# Patient Record
Sex: FEMALE | Race: WHITE | NOT HISPANIC OR LATINO | Employment: FULL TIME | URBAN - METROPOLITAN AREA
[De-identification: names, ages, dates, MRNs, and addresses within clinical notes are randomized per-mention and may not be internally consistent; named-entity substitution may affect disease eponyms.]

---

## 2017-09-19 ENCOUNTER — ALLSCRIPTS OFFICE VISIT (OUTPATIENT)
Dept: OTHER | Facility: OTHER | Age: 57
End: 2017-09-19

## 2017-09-19 DIAGNOSIS — Z12.31 ENCOUNTER FOR SCREENING MAMMOGRAM FOR MALIGNANT NEOPLASM OF BREAST: ICD-10-CM

## 2017-09-19 DIAGNOSIS — M79.671 PAIN OF RIGHT FOOT: ICD-10-CM

## 2017-09-20 ENCOUNTER — GENERIC CONVERSION - ENCOUNTER (OUTPATIENT)
Dept: OTHER | Facility: OTHER | Age: 57
End: 2017-09-20

## 2017-09-26 ENCOUNTER — GENERIC CONVERSION - ENCOUNTER (OUTPATIENT)
Dept: OTHER | Facility: OTHER | Age: 57
End: 2017-09-26

## 2018-01-13 NOTE — PROGRESS NOTES
Assessment   1  Well adult on routine health check (V70 0) (Z00 00)  2  Visit for screening mammogram (V76 12) (Z12 31)    Plan  Benign hypertension    · Renew: Lisinopril-Hydrochlorothiazide 20-25 MG Oral Tablet; TAKE 1 TABLET DAILY  Benign hypertension, Diabetes mellitus type 2, uncomplicated, Dyslipidemia    · (1) COMPREHENSIVE METABOLIC PANEL; Status:Active; Requested for:01Nov2016;    · (1) HEMOGLOBIN A1C; Status:Active; Requested for:01Nov2016;    · (1) LIPID PANEL FASTING W DIRECT LDL REFLEX; Status:Active; Requested  for:01Nov2016;   Diabetes mellitus type 2, uncomplicated    · Renew: MetFORMIN HCl - 500 MG Oral Tablet; TITRATE UP TO TAKE 2 TABLETS BY  MOUTH TWICE A DAY AS DIRECTED  Dyslipidemia    · Renew: Atorvastatin Calcium 40 MG Oral Tablet; TAKE 1 TABLET DAILY  Visit for screening mammogram    · * MAMMO SCREENING BILATERAL W CAD; Status:Hold For - Scheduling; Requested  for:11Aug2016;   Well adult on routine health check    · Follow-up visit in 1 year Evaluation and Treatment  Follow-up  Status: Hold For -  Scheduling  Requested for: 11Aug2016   · Begin a limited exercise program ; Status:Complete;   Done: 02RXX5397 09:27AM   · Brush your teeth freq1 and floss at least once a day ; Status:Complete;   Done:  43CSX0674 09:27AM   · Eat a low fat and low cholesterol diet ; Status:Complete;   Done: 20JYA4949 09:27AM   · Use a sun block product with an SPF of 15 or more ; Status:Complete;   Done:  79HBM1622 09:27AM   · Call [phone] if: You find a new or different kind of lump in your breast ;  Status:Complete;   Done: 68ZFG5666 09:27AM   · Call [phone] if: You have any bleeding from the vagina ; Status:Complete;    Done: 43VNK3858 09:27AM   · Call [phone] if: You have any warning signs of skin cancer ; Status:Complete;    Done: 17VDR3554 09:27AM   · Call [phone] if:  You experience a new kind of chest pain (angina) or pressure ;  Status:Complete;   Done: 45WFQ1203 09: 27AM    Discussion/Summary  health maintenance visit Currently, she eats a healthy diet and has an inadequate exercise regimen  the risks and benefits of cervical cancer screening were discussed Breast cancer screening: mammogram has been ordered  Colorectal cancer screening: colorectal cancer screening is current  Chief Complaint  Patient here for Annual Wellness exam      History of Present Illness  HM, Adult Female: The patient is being seen for a health maintenance evaluation  General Health: The patient's health since the last visit is described as good  She has regular dental visits  She denies vision problems  She denies hearing loss  Immunizations status: not up to date  Lifestyle:  She does not have a healthy diet  She does not have any weight concerns  She does not exercise regularly  She does not use tobacco  She denies alcohol use  She denies drug use  Reproductive health: the patient is postmenopausal    Screening: cancer screening reviewed and updated  Cervical cancer screening includes uncertain timing of her last pap smear  Breast cancer screening includes a mammogram performed last year  Colorectal cancer screening includes a colonoscopy performed within the past ten years  metabolic screening reviewed and updated  Metabolic screening includes lipid profile performed within the past five years, glucose screening performed last year and thyroid function test performed last year  HPI: HERE FOR WELLNESS   DIAGNOSED WITH GASTRIC CA      Review of Systems    Constitutional: No fever, no chills, feels well, no tiredness, no recent weight gain or weight loss  Eyes: No complaints of eye pain, no red eyes, no eyesight problems, no discharge, no dry eyes, no itching of eyes  ENT: no complaints of earache, no loss of hearing, no nose bleeds, no nasal discharge, no sore throat, no hoarseness     Cardiovascular: No complaints of slow heart rate, no fast heart rate, no chest pain, no palpitations, no leg claudication, no lower extremity edema  Respiratory: No complaints of shortness of breath, no wheezing, no cough, no SOB on exertion, no orthopnea, no PND  Gastrointestinal: No complaints of abdominal pain, no constipation, no nausea or vomiting, no diarrhea, no bloody stools  Genitourinary: No complaints of dysuria, no incontinence, no pelvic pain, no dysmenorrhea, no vaginal discharge or bleeding  Musculoskeletal: No complaints of arthralgias, no myalgias, no joint swelling or stiffness, no limb pain or swelling  Integumentary: No complaints of skin rash or lesions, no itching, no skin wounds, no breast pain or lump  Neurological: No complaints of headache, no confusion, no convulsions, no numbness, no dizziness or fainting, no tingling, no limb weakness, no difficulty walking  Psychiatric: Not suicidal, no sleep disturbance, no anxiety or depression, no change in personality, no emotional problems  Endocrine: No complaints of proptosis, no hot flashes, no muscle weakness, no deepening of the voice, no feelings of weakness  Hematologic/Lymphatic: No complaints of swollen glands, no swollen glands in the neck, does not bleed easily, does not bruise easily  Active Problems   1  Atypical nevi (216 9) (D22 9)  2  Benign hypertension (401 1) (I10)  3  Cervical cancer screening (V76 2) (Z12 4)  4  Colon cancer screening (V76 51) (Z12 11)  5  Diabetes mellitus type 2, uncomplicated (562 55) (K70 0)  6  Dyslipidemia (272 4) (E78 5)  7  Encounter for vaccination (V05 9) (Z23)  8  Eye lesion (379 90) (H57 9)  9  Flu vaccine need (V04 81) (Z23)  10  Morbid or severe obesity due to excess calories (278 01) (E66 01)  11  Need for DTaP vaccination (V06 1) (Z23)  12  Polyp of sigmoid colon (211 3) (D12 5)  13  Rectal bleeding (569 3) (K62 5)  14   Visit for screening mammogram (V76 12) (Z12 31)    Past Medical History    · History of Abnormal finding on mammography (793 80) (R92 8)   · History of Allergic Reaction (995 3)   · History of Blood pressure elevated (401 9) (I10)   · History of Breast pain (611 71) (N64 4)   · History of Elevated fasting glucose (790 21) (R73 01)   · History of acute bronchitis (V12 69) (Z87 09)   · History of chest pain (V13 89) (J84 720)   · History of fatigue (V13 89) (Z87 898)   · History of Numbness (782 0) (R20 0)    Surgical History    · History of Biopsy Breast Percutaneous Needle Core    Family History  Mother    · Family history of Brain Cancer (191 9)  Father    · Family history of Diabetes Mellitus (250 00)   · Family history of colon cancer (V16 0) (Z80 0)  Sister    · Family history of Brain Cancer (191 9)  Family History    · Family history of Father  At Age 76    Social History    · Denied: Alcohol Use (History)   · Denied: Drug Use   · Never A Smoker    Current Meds  1  Atorvastatin Calcium 20 MG Oral Tablet; take 1 tablet by mouth every day; Therapy: 18ZNW1862 to (Evaluate:27Esp1021)  Requested for: 13QCT3459; Last   Rx:78Ioy3362 Ordered  2  Lisinopril-Hydrochlorothiazide 20-25 MG Oral Tablet; TAKE 1 TABLET DAILY; Therapy: 00FJY3084 to (Evaluate:95Wva1330)  Requested for: 69BTO1355; Last   Rx:2016 Ordered  3  MetFORMIN HCl - 500 MG Oral Tablet; TITRATE UP TO TAKE 2 TABLETS BY MOUTH   TWICE A DAY AS DIRECTED; Therapy: 71Avx4457 to (Evaluate:41Ykq2154)  Requested for: 97HOQ9679; Last   Rx:2016 Ordered    Allergies   1  No Known Drug Allergies   2  No Known Environmental Allergies  3  No Known Food Allergies    Vitals   Recorded: 72AEA2628 77:29AC   Systolic 224 mm Hg   Diastolic 80 mm Hg   Heart Rate 72   Respiration 18   Height 5 ft 8 03 in   Weight 286 lb    BMI Calculated 43 45 kg/m2   BSA Calculated 2 38 m2     Physical Exam    Constitutional   General appearance: No acute distress, well appearing and well nourished  Head and Face   Head and face: Normal     Eyes   Conjunctiva and lids: No swelling, erythema or discharge  Pupils and irises: Equal, round, reactive to light  Ears, Nose, Mouth, and Throat   External inspection of ears and nose: Normal     Otoscopic examination: Tympanic membranes translucent with normal light reflex  Canals patent without erythema  Hearing: Normal     Nasal mucosa, septum, and turbinates: Normal without edema or erythema  Lips, teeth, and gums: Normal, good dentition  Oropharynx: Normal with no erythema, edema, exudate or lesions  Neck   Neck: Supple, symmetric, trachea midline, no masses  Thyroid: Normal, no thyromegaly  Pulmonary   Respiratory effort: No increased work of breathing or signs of respiratory distress  Auscultation of lungs: Clear to auscultation  Cardiovascular   Auscultation of heart: Normal rate and rhythm, normal S1 and S2, no murmurs  Examination of extremities for edema and/or varicosities: Normal     Abdomen   Abdomen: Non-tender, no masses  Liver and spleen: No hepatomegaly or splenomegaly  Lymphatic   Palpation of lymph nodes in neck: No lymphadenopathy  Palpation of lymph nodes in axillae: No lymphadenopathy  Musculoskeletal   Gait and station: Normal     Digits and nails: Normal without clubbing or cyanosis  Joints, bones, and muscles: Normal     Range of motion: Normal     Stability: Normal     Muscle strength/tone: Normal     Skin   Skin and subcutaneous tissue: Normal without rashes or lesions  Palpation of skin and subcutaneous tissue: Normal turgor  Neurologic   Cranial nerves: Cranial nerves II-XII intact  Cortical function: Normal mental status  Reflexes: 2+ and symmetric  Sensation: No sensory loss  Coordination: Normal finger to nose and heel to shin  Psychiatric   Judgment and insight: Normal     Orientation to person, place, and time: Normal     Recent and remote memory: Intact      Mood and affect: Normal        Results/Data  COLONOSCOPY 99Zco0974 12:00AM      Test Name Result Flag Reference Colonoscopy 09/18/2015       Summary / No summary entered :      No summary entered  Documents attached :      sColonoscopies - Katalina Smallwood;  Enc: [id number] - Image Encounter - Katalina mSallwood -      (Family Medicine) (Additional Information Document)    Signatures   Electronically signed by : Sanam Blackburn DO; Aug 11 2016  9:26AM EST                       (Author)

## 2018-01-15 VITALS
HEIGHT: 68 IN | RESPIRATION RATE: 18 BRPM | WEIGHT: 269 LBS | SYSTOLIC BLOOD PRESSURE: 126 MMHG | DIASTOLIC BLOOD PRESSURE: 76 MMHG | BODY MASS INDEX: 40.77 KG/M2 | HEART RATE: 80 BPM

## 2018-01-15 NOTE — PROGRESS NOTES
Assessment    1  Encounter for preventive health examination (V70 0) (Z00 00)   2  Diabetes mellitus type 2, uncomplicated (988 28) (J99 6)   3  Dyslipidemia (272 4) (E78 5)   4  Polyp of sigmoid colon (211 3) (D12 5)   5  Visit for screening mammogram (V76 12) (Z12 31)   6  Flu vaccine need (V04 81) (Z23)   7  Pain of right heel (729 5) (M79 671)   8  History of Atypical nevi (216 9) (D22 9)   9  Atypical nevi (216 9) (D22 9)    Plan  Atypical nevi    · 2 - Maeve TAFOYA, Rebecca Malcolm  (Dermatology) Co-Management  *  Status: Hold For -  Scheduling  Requested for: 42BNH3775  Care Summary provided  : Yes  Benign hypertension    · Lisinopril-Hydrochlorothiazide 20-25 MG Oral Tablet; TAKE 1 TABLET DAILY  Diabetes mellitus type 2, uncomplicated    · MetFORMIN HCl - 500 MG Oral Tablet; TITRATE UP TO TAKE 2 TABLETS BY  MOUTH TWICE A DAY AS DIRECTED   · *VB - Foot Exam; Status:Complete;   Done: 25VZD6110 03:23PM   · *VB - PHQ-9 Tool; Status:Complete;   Done: 98CMB9124 03:57PM  Flu vaccine need    · Fluzone Quadrivalent 0 5 ML Intramuscular Suspension Prefilled Syringe  Need for prophylactic vaccination and inoculation against influenza    · Stop: Fluzone Quadrivalent 0 5 ML Intramuscular Suspension Prefilled  Syringe  Pain of right heel    · * XR FOOT 3+ VIEW RIGHT; Status:Active; Requested for:65Ruc2351;   Polyp of sigmoid colon    · COLONOSCOPY; Status:Active; Requested for:30Jek8588;    · 2 - Kalpana Zhang MD, Erick Bond  Colorectal Surgery, Gastroenterology Co-Management  *   Status: Hold For - Scheduling  Requested for: 59YBL9308  Care Summary provided  : Yes  Visit for screening mammogram    · * MAMMO SCREENING BILATERAL W CAD; Status:Hold For - Scheduling; Requested  for:86Ogo0131;     Discussion/Summary  health maintenance visit Currently, she eats a healthy diet and has an adequate exercise regimen  cervical cancer screening is needed every three years Breast cancer screening: mammogram has been ordered   Colorectal cancer screening: colonoscopy has been ordered  Screening lab work includes hemoglobin  She was advised to be evaluated by an ophthalmologist  Advice and education were given regarding aerobic exercise  Patient discussion: discussed with the patient  DEPRESSION SCREEN POSITIVE DUE TO RECENT LOSS OF   PT IS GOING TO [employer] THROUGH [employer] AND DOENS'T FEEL SHE NEEDS ANYTHING ELSE AT THIS POINT  Chief Complaint  Patient here for annual wellness exam      History of Present Illness  HM, Adult Female: The patient is being seen for a health maintenance evaluation  General Health: The patient's health since the last visit is described as good  She has regular dental visits  She denies vision problems  She denies hearing loss  Immunizations status: up to date  Lifestyle:  She consumes a diverse and healthy diet  She does not have any weight concerns  She exercises regularly  She does not use tobacco  She denies alcohol use  She denies drug use  Screening:      Review of Systems    Constitutional: No fever, no chills, feels well, no tiredness, no recent weight gain or weight loss  Eyes: No complaints of eye pain, no red eyes, no eyesight problems, no discharge, no dry eyes, no itching of eyes  ENT: no complaints of earache, no loss of hearing, no nose bleeds, no nasal discharge, no sore throat, no hoarseness  Cardiovascular: No complaints of slow heart rate, no fast heart rate, no chest pain, no palpitations, no leg claudication, no lower extremity edema  Respiratory: No complaints of shortness of breath, no wheezing, no cough, no SOB on exertion, no orthopnea, no PND  Gastrointestinal: No complaints of abdominal pain, no constipation, no nausea or vomiting, no diarrhea, no bloody stools  Genitourinary: No complaints of dysuria, no incontinence, no pelvic pain, no dysmenorrhea, no vaginal discharge or bleeding     Musculoskeletal: No complaints of arthralgias, no myalgias, no joint swelling or stiffness, no limb pain or swelling  Integumentary: No complaints of skin rash or lesions, no itching, no skin wounds, no breast pain or lump  Neurological: No complaints of headache, no confusion, no convulsions, no numbness, no dizziness or fainting, no tingling, no limb weakness, no difficulty walking  Psychiatric: Not suicidal, no sleep disturbance, no anxiety or depression, no change in personality, no emotional problems  Endocrine: No complaints of proptosis, no hot flashes, no muscle weakness, no deepening of the voice, no feelings of weakness  Hematologic/Lymphatic: No complaints of swollen glands, no swollen glands in the neck, does not bleed easily, does not bruise easily  Over the past 2 weeks, how often have you been bothered by the following problems? 1 ) Little interest or pleasure in doing things? Several days  2 ) Feeling down, depressed or hopeless? Several days  3 ) Trouble falling asleep or sleeping too much? Half the days or more  4 ) Feeling tired or having little energy? Half the days or more  5 ) Poor appetite or overeating? Not at all    6 ) Feeling bad about yourself, or that you are a failure, or have let yourself or your family down? Not at all    7 ) Trouble concentrating on things, such as reading a newspaper or watching television? Several days  8 ) Moving or speaking so slowly that other people could have noticed, or the opposite, moving or speaking faster than usual? Not at all  severity of depression is mild   How difficult have these problems made it for you to do your work, take care of things at home, or get along with people? Not at all  Score 7      Active Problems    1  Benign hypertension (401 1) (I10)   2  Cervical cancer screening (V76 2) (Z12 4)   3  Colon cancer screening (V76 51) (Z12 11)   4  Diabetes mellitus type 2, uncomplicated (780 99) (Y26 2)   5  Dyslipidemia (272 4) (E78 5)   6  Encounter for vaccination (V05 9) (Z23)   7   Eye lesion (379 90) (H57 9)   8  Flu vaccine need (V04 81) (Z23)   9  Morbid or severe obesity due to excess calories (278 01) (E66 01)   10  Need for DTaP vaccination (V06 1) (Z23)   11  Need for prophylactic vaccination and inoculation against influenza (V04 81) (Z23)   12  Polyp of sigmoid colon (211 3) (D12 5)   13  Visit for screening mammogram (V76 12) (Z12 31)   14  Well adult on routine health check (V70 0) (Z00 00)    Past Medical History    · History of Abnormal finding on mammography (793 80) (R92 8)   · History of Allergic Reaction (995 3)   · History of Atypical nevi (216 9) (D22 9)   · History of Blood pressure elevated (401 9) (I10)   · History of Breast pain (611 71) (N64 4)   · History of Elevated fasting glucose (790 21) (R73 01)   · History of acute bronchitis (V12 69) (Z87 09)   · History of chest pain (V13 89) (I23 696)   · History of fatigue (V13 89) (Z87 898)   · History of Numbness (782 0) (R20 0)    Surgical History    · History of Biopsy Breast Percutaneous Needle Core    Family History  Mother    · Family history of Brain Cancer (191 9)  Father    · Family history of Diabetes Mellitus (250 00)   · Family history of colon cancer (V16 0) (Z80 0)  Sister    · Family history of Brain Cancer (191 9)  Family History    · Family history of Father  At Age 76    Social History    · Denied: Alcohol Use (History)   · Denied: Drug Use   · Never A Smoker    Current Meds   1  Atorvastatin Calcium 40 MG Oral Tablet; Take 1 tablet daily; Therapy: 47GBX4375 to (Last Rx:45Xbi0273)  Requested for: 24SKK8478 Ordered   2  Lisinopril-Hydrochlorothiazide 20-25 MG Oral Tablet; TAKE 1 TABLET DAILY; Therapy: 59XJE2227 to (Evaluate:74Kpt4488)  Requested for: 2016; Last   Rx:2016 Ordered   3  MetFORMIN HCl - 500 MG Oral Tablet; TITRATE UP TO TAKE 2 TABLETS BY MOUTH   TWICE A DAY AS DIRECTED;    Therapy: 20Yan9741 to (Evaluate:15Upk2506)  Requested for: 2016; Last   Rx:2016 Ordered    Allergies    1  No Known Drug Allergies    2  No Known Environmental Allergies   3  No Known Food Allergies    Vitals   Recorded: 19Sep2017 03:18PM   Heart Rate 80   Respiration 18   Systolic 576   Diastolic 76   Height 5 ft 7 52 in   Weight 269 lb    BMI Calculated 41 49   BSA Calculated 2 31     Physical Exam    Constitutional   General appearance: No acute distress, well appearing and well nourished  Head and Face   Head and face: Normal     Eyes   Conjunctiva and lids: No swelling, erythema or discharge  Pupils and irises: Equal, round, reactive to light  Ears, Nose, Mouth, and Throat   External inspection of ears and nose: Normal     Otoscopic examination: Tympanic membranes translucent with normal light reflex  Canals patent without erythema  Hearing: Normal     Nasal mucosa, septum, and turbinates: Normal without edema or erythema  Lips, teeth, and gums: Normal, good dentition  Oropharynx: Normal with no erythema, edema, exudate or lesions  Neck   Neck: Supple, symmetric, trachea midline, no masses  Thyroid: Normal, no thyromegaly  Pulmonary   Respiratory effort: No increased work of breathing or signs of respiratory distress  Auscultation of lungs: Clear to auscultation  Cardiovascular   Auscultation of heart: Normal rate and rhythm, normal S1 and S2, no murmurs  Examination of extremities for edema and/or varicosities: Normal     Abdomen   Abdomen: Non-tender, no masses  Liver and spleen: No hepatomegaly or splenomegaly  Lymphatic   Palpation of lymph nodes in neck: No lymphadenopathy  Palpation of lymph nodes in axillae: No lymphadenopathy  Musculoskeletal   Gait and station: Normal     Digits and nails: Normal without clubbing or cyanosis  Joints, bones, and muscles: Normal     Range of motion: Normal     Stability: Normal     Muscle strength/tone: Normal     Skin   Skin and subcutaneous tissue: Abnormal   right side abdomen dark nevi  Neurologic   Cranial nerves: Cranial nerves II-XII intact  Cortical function: Normal mental status  Reflexes: 2+ and symmetric  Sensation: No sensory loss  Coordination: Normal finger to nose and heel to shin  Psychiatric   Judgment and insight: Normal     Orientation to person, place, and time: Normal     Recent and remote memory: Intact  Mood and affect: Normal         Socks and shoes removed, Right Foot Findings: dry  The right toes were normal    The sensory exam showed normal vibratory sensation at the level of the toes on the right  Normal tactile sensation with monofilament testing throughout the right foot  Socks and shoes removed, Left Foot Findings: dry  The left toes were normal    The sensory exam showed normal vibratory sensation at the level of the toes on the left  Pulses:   2+ in the posterior tibialis on the right   2+ in the dorsalis pedis on the right  Pulses:   2+ in the posterior tibialis on the left   2+ in the dorsalis pedis on the left  Assign Risk Category: 0: No loss of protective sensation, no deformity  No present risk        Results/Data  *VB - PHQ-9 Tool 90Eas6401 03:57PM Yasmany Sparrow     Test Name Result Flag Reference   PHQ-9 Adult Depression Score 7     PHQ-9 Adult Depression Screening Positive       *VB - Foot Exam 42Cck7194 03:23PM Yasmany Sparrow     Test Name Result Flag Reference   FOOT EXAM [id number]         Future Appointments    Date/Time Provider Specialty Site   01/22/2018 03:45 PM Yasmany Sparrow DO [facility]     Signatures   Electronically signed by : Mariana Orellana DO; Sep 19 2017  4:11PM EST                       (Author)

## 2018-01-15 NOTE — RESULT NOTES
Verified Results  (1) COMPREHENSIVE METABOLIC PANEL [id number] 32:19XW Vicky Proffer     Test Name Result Flag Reference   GLUCOSE,RANDM 122 mg/dL     If the patient is fasting, the ADA then defines impaired fasting glucose as > 100 mg/dL and diabetes as > or equal to 123 mg/dL  SODIUM 140 mmol/L  136-145   POTASSIUM 3 9 mmol/L  3 5-5 3   CHLORIDE 102 mmol/L  100-108   CARBON DIOXIDE 29 mmol/L  21-32   ANION GAP (CALC) 9 mmol/L  4-13   BLOOD UREA NITROGEN 16 mg/dL  5-25   CREATININE 0 97 mg/dL  0 60-1 30   Standardized to IDMS reference method   CALCIUM 9 0 mg/dL  8 3-10 1   BILI, TOTAL 0 90 mg/dL  0 20-1 00   ALK PHOSPHATAS 82 U/L     ALT (SGPT) 86 U/L H 12-78   AST(SGOT) 43 U/L  5-45   ALBUMIN 3 4 g/dL L 3 5-5 0   TOTAL PROTEIN 7 7 g/dL  6 4-8 2   eGFR Non-African American 59 0 ml/min/1 73sq [facility] [employer] Disease Education Program recommendations are as follows:  GFR calculation is accurate only with a steady state creatinine  Chronic Kidney disease less than 60 ml/min/1 73 sq  meters  Kidney failure less than 15 ml/min/1 73 sq  meters  (1) LIPID PANEL FASTING W DIRECT LDL REFLEX [id number] 11:38AM Vicky Proffer     Test Name Result Flag Reference   CHOLESTEROL 249 mg/dL H    LDL CHOLESTEROL CALCULATED 148 mg/dL H 0-100   Triglyceride:         Normal              <150 mg/dl       Borderline High    150-199 mg/dl       High               200-499 mg/dl       Very High          >499 mg/dl  Cholesterol:         Desirable        <200 mg/dl      Borderline High  200-239 mg/dl      High             >239 mg/dl  HDL Cholesterol:        High    >59 mg/dL      Low     <41 mg/dL  LDL Cholesterol:        Optimal          <100 mg/dl        Near Optimal     100-129 mg/dl        Above Optimal          Borderline High   130-159 mg/dl          High              160-189 mg/dl          Very High        >189 mg/dl  LDL CALCULATED:    This screening LDL is a calculated result    It does not have the accuracy of the Direct Measured LDL in the monitoring of patients with hyperlipidemia and/or statin therapy  Direct Measure LDL (LRL250) must be ordered separately in these patients  TRIGLYCERIDES 276 mg/dL H <=150   Specimen collection should occur prior to N-Acetylcysteine or Metamizole administration due to the potential for falsely depressed results  HDL,DIRECT 46 mg/dL  40-60   Specimen collection should occur prior to Metamizole administration due to the potential for falsely depressed results  (1) TSH WITH FT4 REFLEX 30Jun2016 11:38AM [facility]     Test Name Result Flag Reference   TSH 2 462 uIU/mL  0 358-3 740   Patients undergoing fluorescein dye angiography may retain small amounts of fluorescein in the body for 48-72 hours post procedure  Samples containing fluorescein can produce falsely depressed TSH values  If the patient had this procedure,a specimen should be resubmitted post fluorescein clearance  The recommended reference ranges for TSH during pregnancy are as follows:  First trimester 0 1 to 2 5 uIU/mL  Second trimester  0 2 to 3 0 uIU/mL  Third trimester 0 3 to 3 0 uIU/m     (1) HEMOGLOBIN A1C [id number] 11:38AM [facility]     Test Name Result Flag Reference   HEMOGLOBIN A1C 6 6 % H 4 2-6 3   EST  AVG  GLUCOSE 143 mg/dl       (1) MICROALBUMIN CREATININE RATIO, RANDOM URINE 30Jun2016 11:38AM [facility]     Test Name Result Flag Reference   MICROALBUMIN/ CREAT R 4 mg/g creatinine  0-30   MICROALBUMIN,URINE 7 5 mg/L  0 0-20 0   CREATININE URINE 167 0 mg/dL         Discussion/Summary   CHOLESTEROL IS HIGH  DIABETES NUMBER BETTER  WILL DISCUSS AT NEXT VISIT     Phillip Ewdard

## 2018-09-14 DIAGNOSIS — I10 ESSENTIAL HYPERTENSION: Primary | ICD-10-CM

## 2018-09-14 RX ORDER — LISINOPRIL AND HYDROCHLOROTHIAZIDE 25; 20 MG/1; MG/1
TABLET ORAL
Qty: 30 TABLET | Refills: 0 | Status: SHIPPED | OUTPATIENT
Start: 2018-09-14 | End: 2019-02-26 | Stop reason: SDUPTHER

## 2018-09-20 DIAGNOSIS — E11.9 CONTROLLED TYPE 2 DIABETES MELLITUS WITHOUT COMPLICATION, WITHOUT LONG-TERM CURRENT USE OF INSULIN (HCC): Primary | ICD-10-CM

## 2019-02-26 ENCOUNTER — OFFICE VISIT (OUTPATIENT)
Dept: FAMILY MEDICINE CLINIC | Facility: CLINIC | Age: 59
End: 2019-02-26
Payer: COMMERCIAL

## 2019-02-26 VITALS
RESPIRATION RATE: 14 BRPM | HEIGHT: 68 IN | HEART RATE: 87 BPM | DIASTOLIC BLOOD PRESSURE: 90 MMHG | SYSTOLIC BLOOD PRESSURE: 144 MMHG | BODY MASS INDEX: 41.74 KG/M2 | TEMPERATURE: 98.6 F | OXYGEN SATURATION: 98 % | WEIGHT: 275.4 LBS

## 2019-02-26 DIAGNOSIS — E66.01 MORBID OBESITY (HCC): ICD-10-CM

## 2019-02-26 DIAGNOSIS — Z12.31 ENCOUNTER FOR SCREENING MAMMOGRAM FOR BREAST CANCER: ICD-10-CM

## 2019-02-26 DIAGNOSIS — R06.83 SNORING: ICD-10-CM

## 2019-02-26 DIAGNOSIS — E11.9 TYPE 2 DIABETES MELLITUS WITHOUT COMPLICATION, WITHOUT LONG-TERM CURRENT USE OF INSULIN (HCC): ICD-10-CM

## 2019-02-26 DIAGNOSIS — Z12.11 SCREENING FOR COLON CANCER: ICD-10-CM

## 2019-02-26 DIAGNOSIS — I10 BENIGN HYPERTENSION: ICD-10-CM

## 2019-02-26 DIAGNOSIS — Z23 NEED FOR VACCINATION: ICD-10-CM

## 2019-02-26 DIAGNOSIS — E78.5 DYSLIPIDEMIA: ICD-10-CM

## 2019-02-26 DIAGNOSIS — E11.9 CONTROLLED TYPE 2 DIABETES MELLITUS WITHOUT COMPLICATION, WITHOUT LONG-TERM CURRENT USE OF INSULIN (HCC): ICD-10-CM

## 2019-02-26 DIAGNOSIS — Z00.00 ANNUAL PHYSICAL EXAM: Primary | ICD-10-CM

## 2019-02-26 DIAGNOSIS — I10 ESSENTIAL HYPERTENSION: ICD-10-CM

## 2019-02-26 PROBLEM — D22.9 ATYPICAL NEVI: Status: ACTIVE | Noted: 2017-09-19

## 2019-02-26 PROCEDURE — 99396 PREV VISIT EST AGE 40-64: CPT | Performed by: FAMILY MEDICINE

## 2019-02-26 PROCEDURE — 90732 PPSV23 VACC 2 YRS+ SUBQ/IM: CPT

## 2019-02-26 PROCEDURE — 90471 IMMUNIZATION ADMIN: CPT

## 2019-02-26 RX ORDER — ATORVASTATIN CALCIUM 40 MG/1
1 TABLET, FILM COATED ORAL DAILY
COMMUNITY
Start: 2014-07-21 | End: 2019-02-26 | Stop reason: SDUPTHER

## 2019-02-26 RX ORDER — LISINOPRIL AND HYDROCHLOROTHIAZIDE 25; 20 MG/1; MG/1
1 TABLET ORAL DAILY
Qty: 90 TABLET | Refills: 3 | Status: SHIPPED | OUTPATIENT
Start: 2019-02-26 | End: 2020-03-03

## 2019-02-26 RX ORDER — ATORVASTATIN CALCIUM 40 MG/1
40 TABLET, FILM COATED ORAL DAILY
Qty: 90 TABLET | Refills: 3 | Status: SHIPPED | OUTPATIENT
Start: 2019-02-26 | End: 2020-03-03

## 2019-02-26 NOTE — PATIENT INSTRUCTIONS

## 2019-02-26 NOTE — PROGRESS NOTES
ADULT ANNUAL PHYSICAL  [facility] - [facility]on [facility]    NAME: Yanci Courtney  AGE: 59 y o  SEX: female  : 1960     DATE: 2019     Assessment and Plan:     Problem List Items Addressed This Visit        Endocrine    Diabetes mellitus type 2, uncomplicated ([facility] [address] )    Relevant Medications    metFORMIN (GLUCOPHAGE) 500 mg tablet    Other Relevant Orders    Hemoglobin A1C    Microalbumin / creatinine urine ratio       Cardiovascular and Mediastinum    Benign hypertension    Relevant Medications    lisinopril-hydrochlorothiazide (PRINZIDE,ZESTORETIC) 20-25 MG per tablet    Other Relevant Orders    CBC    Comprehensive metabolic panel       Other    Dyslipidemia    Relevant Medications    atorvastatin (LIPITOR) 40 mg tablet    Other Relevant Orders    Lipid Panel with Direct LDL reflex    TSH, 3rd generation with Free T4 reflex    Morbid obesity ([facility] [address] )    Annual physical exam - Primary    Encounter for screening mammogram for breast cancer    Relevant Orders    Mammo screening bilateral w cad    Screening for colon cancer    Relevant Orders    Ambulatory referral to Colorectal Surgery    Snoring    Relevant Orders    Ambulatory referral to Sleep Medicine      Other Visit Diagnoses     Need for vaccination        Relevant Orders    PNEUMOCOCCAL POLYSACCHARIDE VACCINE 23-VALENT =>3YO SQ IM    Controlled type 2 diabetes mellitus without complication, without long-term current use of insulin (HCC)        Relevant Medications    metFORMIN (GLUCOPHAGE) 500 mg tablet    Essential hypertension        Relevant Medications    lisinopril-hydrochlorothiazide (PRINZIDE,ZESTORETIC) 20-25 MG per tablet          Health maintenance and preventative care screenings were discussed with patient today  Appropriate education was printed on patient's after visit summary  · Discussed risks/benefits of screening for breast cancer, cervical cancer, colorectal cancer, high cholesterol and diabetes   Patient agrees to screening for breast cancer, cervical cancer, colorectal cancer and high cholesterol  · Immunizations were reviewed: patient agrees to pneumococcal vaccine  Counseling:  · Dental Health: discussed importance of regular tooth brushing, flossing, and dental visits  No follow-ups on file  Chief Complaint:     Chief Complaint   Patient presents with    Annual Exam     Patient here for annual wellness exam       History of Present Illness:     Adult Annual Physical   Patient here for a comprehensive physical exam  The patient reports problems - not sleeping  Diet and Physical Activity  · Diet/Nutrition: well balanced diet  · Weight concerns: patient has class 3 obesity (BMI >40)  · Exercise: no formal exercise  Depression Screening  PHQ-9 Depression Screening    PHQ-9:    Frequency of the following problems over the past two weeks:       Little interest or pleasure in doing things:  0 - not at all  Feeling down, depressed, or hopeless:  0 - not at all  PHQ-2 Score:  0       General Health  · Sleep: sleeps poorly  · Hearing: normal - bilateral   · Vision: no vision problems, most recent eye exam <1 year ago and wears glasses  · Dental: regular dental visits and brushes teeth twice daily  /GYN Health  · Patient is: postmenopausal  · Last menstrual period: n/a  · Contraceptive method: none  · Menopausal symptoms: none  Review of Systems:     Review of Systems   Constitutional: Positive for fatigue  HENT: Negative  Eyes: Negative  Respiratory: Negative  Cardiovascular: Negative  Gastrointestinal: Negative  Endocrine: Negative  Genitourinary: Negative  Musculoskeletal: Negative  Skin: Negative  Allergic/Immunologic: Negative  Neurological: Positive for headaches (briefly in am)  Psychiatric/Behavioral: Positive for sleep disturbance  Past Medical History:     No past medical history on file     Past Surgical History:     No past surgical history on file  Social History:     Social History     Socioeconomic History    Marital status: /Civil Union     Spouse name: Not on file    Number of children: Not on file    Years of education: Not on file    Highest education level: Not on file   Occupational History    Not on file   Social Needs    Financial resource strain: Not on file    Food insecurity:     Worry: Not on file     Inability: Not on file    Transportation needs:     Medical: Not on file     Non-medical: Not on file   Tobacco Use    Smoking status: Not on file   Substance and Sexual Activity    Alcohol use: Not on file    Drug use: Not on file    Sexual activity: Not on file   Lifestyle    Physical activity:     Days per week: Not on file     Minutes per session: Not on file    Stress: Not on file   Relationships    Social connections:     Talks on phone: Not on file     Gets together: Not on file     Attends Caodaism service: Not on file     Active member of club or organization: Not on file     Attends meetings of clubs or organizations: Not on file     Relationship status: Not on file    Intimate partner violence:     Fear of current or ex partner: Not on file     Emotionally abused: Not on file     Physically abused: Not on file     Forced sexual activity: Not on file   Other Topics Concern    Not on file   Social History Narrative    Not on file      Family History:     No family history on file  Current Medications:     Current Outpatient Medications   Medication Sig Dispense Refill    atorvastatin (LIPITOR) 40 mg tablet Take 1 tablet (40 mg total) by mouth daily for 90 days 90 tablet 3    lisinopril-hydrochlorothiazide (PRINZIDE,ZESTORETIC) 20-25 MG per tablet Take 1 tablet by mouth daily 90 tablet 3    metFORMIN (GLUCOPHAGE) 500 mg tablet Take 2 tablets (1,000 mg total) by mouth 2 (two) times a day with meals for 90 days 360 tablet 3     No current facility-administered medications for this visit  Allergies:     No Known Allergies   Objective:     /90   Pulse 87   Temp 98 6 °F (37 °C)   Resp 14   Ht 5' 7 52" (1 715 m)   Wt 125 kg (275 lb 6 4 oz)   SpO2 98%   BMI 42 47 kg/m²     Physical Exam   Constitutional: She is oriented to person, place, and time  She appears well-developed and well-nourished  HENT:   Head: Normocephalic and atraumatic  Right Ear: External ear normal    Left Ear: External ear normal    Nose: Nose normal    Mouth/Throat: Oropharynx is clear and moist    Eyes: Pupils are equal, round, and reactive to light  Conjunctivae and EOM are normal    Neck: Normal range of motion  Neck supple  Cardiovascular: Normal rate, regular rhythm, normal heart sounds and intact distal pulses  Pulmonary/Chest: Effort normal and breath sounds normal    Abdominal: Soft  Bowel sounds are normal    Musculoskeletal: Normal range of motion  Neurological: She is alert and oriented to person, place, and time  Skin: Skin is warm and dry  Capillary refill takes less than 2 seconds  Psychiatric: She has a normal mood and affect  Her behavior is normal  Judgment and thought content normal    Nursing note and vitals reviewed         Health Maintenance:     Health Maintenance   Topic Date Due    Hepatitis C Screening  1960    BMI: Followup Plan  05/13/1976    HEPATITIS B VACCINES (1 of 3 - Risk 3-dose series) 05/13/1977    PAP SMEAR  05/13/1979    Pneumococcal PPSV23 Highest Risk Adult (2 of 3 - PPSV23) 08/08/2016    MAMMOGRAM  08/24/2016    CRC Screening: Colonoscopy  09/18/2016    INFLUENZA VACCINE  02/26/2020 (Originally 7/1/2018)    Depression Screening PHQ  02/26/2020    BMI: Adult  02/26/2020    DTaP,Tdap,and Td Vaccines (2 - Td) 08/07/2025     Immunization History   Administered Date(s) Administered    Influenza Quadrivalent Preservative Free 3 years and older IM 12/30/2015, 12/13/2016, 09/19/2017    Pneumococcal Conjugate 13-Valent 06/13/2016    Tdap 08/07/2015 Sarah Favorite, DO  [address] [facility]

## 2019-07-03 ENCOUNTER — OFFICE VISIT (OUTPATIENT)
Dept: FAMILY MEDICINE CLINIC | Facility: CLINIC | Age: 59
End: 2019-07-03
Payer: COMMERCIAL

## 2019-07-03 VITALS
SYSTOLIC BLOOD PRESSURE: 122 MMHG | TEMPERATURE: 97.8 F | BODY MASS INDEX: 41.59 KG/M2 | WEIGHT: 274.4 LBS | OXYGEN SATURATION: 97 % | RESPIRATION RATE: 14 BRPM | DIASTOLIC BLOOD PRESSURE: 64 MMHG | HEART RATE: 77 BPM | HEIGHT: 68 IN

## 2019-07-03 DIAGNOSIS — J40 BRONCHITIS: ICD-10-CM

## 2019-07-03 DIAGNOSIS — I10 BENIGN HYPERTENSION: ICD-10-CM

## 2019-07-03 DIAGNOSIS — E78.5 DYSLIPIDEMIA: ICD-10-CM

## 2019-07-03 DIAGNOSIS — E11.9 TYPE 2 DIABETES MELLITUS WITHOUT COMPLICATION, WITHOUT LONG-TERM CURRENT USE OF INSULIN (HCC): Primary | ICD-10-CM

## 2019-07-03 LAB
CREAT UR-MCNC: 282 MG/DL
MICROALBUMIN UR-MCNC: 25.8 MG/L (ref 0–20)
MICROALBUMIN/CREAT 24H UR: 9 MG/G CREATININE (ref 0–30)
SL AMB POCT HEMOGLOBIN AIC: 6.6 (ref ?–6.5)

## 2019-07-03 PROCEDURE — 82570 ASSAY OF URINE CREATININE: CPT | Performed by: FAMILY MEDICINE

## 2019-07-03 PROCEDURE — 82043 UR ALBUMIN QUANTITATIVE: CPT | Performed by: FAMILY MEDICINE

## 2019-07-03 PROCEDURE — 3061F NEG MICROALBUMINURIA REV: CPT | Performed by: FAMILY MEDICINE

## 2019-07-03 PROCEDURE — 3044F HG A1C LEVEL LT 7.0%: CPT | Performed by: FAMILY MEDICINE

## 2019-07-03 PROCEDURE — 99214 OFFICE O/P EST MOD 30 MIN: CPT | Performed by: FAMILY MEDICINE

## 2019-07-03 PROCEDURE — 3008F BODY MASS INDEX DOCD: CPT | Performed by: FAMILY MEDICINE

## 2019-07-03 PROCEDURE — 83036 HEMOGLOBIN GLYCOSYLATED A1C: CPT | Performed by: FAMILY MEDICINE

## 2019-07-03 RX ORDER — ALBUTEROL SULFATE 90 UG/1
2 AEROSOL, METERED RESPIRATORY (INHALATION) EVERY 6 HOURS PRN
Qty: 1 INHALER | Refills: 1 | Status: SHIPPED | OUTPATIENT
Start: 2019-07-03 | End: 2022-05-03

## 2019-07-03 NOTE — PROGRESS NOTES
Assessment/Plan:    Problem List Items Addressed This Visit        Endocrine    Diabetes mellitus type 2, uncomplicated ([facility] [address] ) - Primary     Lab Results   Component Value Date    HGBA1C 6 6 (A) 07/03/2019       No results for input(s): POCGLU in the last 72 hours  Blood Sugar Average: Last 72 hrs:  stable on metformin         Relevant Orders    POCT hemoglobin A1c (Completed)    Microalbumin / creatinine urine ratio    CBC    Comprehensive metabolic panel    Lipid Panel with Direct LDL reflex    TSH, 3rd generation with Free T4 reflex       Respiratory    Bronchitis    Relevant Medications    albuterol (VENTOLIN HFA) 90 mcg/act inhaler       Cardiovascular and Mediastinum    Benign hypertension     Stable on current dose            Other    Dyslipidemia     Taking lipitor- order labs           Relevant Orders    Lipid Panel with Direct LDL reflex    BMI 40 0-44 9, adult ([facility])     Discussed diet and exercise               BMI Counseling: Body mass index is 41 73 kg/m²  Discussed the patient's BMI with her  The BMI is above average  BMI counseling and education was provided to the patient  Nutrition recommendations include 3-5 servings of fruits/vegetables daily  Exercise recommendations include exercising 3-5 times per week  There are no Patient Instructions on file for this visit  No follow-ups on file  Subjective:      Patient ID: Myriam Rivers is a 62 y o  female  Chief Complaint   Patient presents with    Follow-up     Patient here for 3 month follow up on Type II Diabetes, Hypertension, Dyslipidemia        Here for follow up  Selling house  Didn't get labs done  Will need to go for mammogram  Recent uri symptoms- [place] in [place]    Hypertension   This is a chronic problem  The current episode started more than 1 year ago  The problem is unchanged  The problem is controlled  There are no associated agents to hypertension   Risk factors for coronary artery disease include diabetes mellitus and dyslipidemia  Past treatments include ACE inhibitors  The current treatment provides significant improvement  There are no compliance problems  Hyperlipidemia   This is a chronic problem  The current episode started more than 1 year ago  The problem is controlled  Recent lipid tests were reviewed and are normal  Current antihyperlipidemic treatment includes statins  The current treatment provides significant improvement of lipids  There are no compliance problems  URI    This is a new problem  The current episode started in the past 7 days  Associated symptoms include congestion (productive), coughing, diarrhea, rhinorrhea and a sore throat  Pertinent negatives include no ear pain, nausea, plugged ear sensation, sinus pain or vomiting  She has tried steam (cough med) for the symptoms  The treatment provided mild relief  Diabetes   She presents for her follow-up diabetic visit  She has type 2 diabetes mellitus  Her disease course has been stable  There are no hypoglycemic associated symptoms  There are no diabetic associated symptoms  There are no hypoglycemic complications  Symptoms are stable  There are no diabetic complications  Risk factors for coronary artery disease include dyslipidemia and diabetes mellitus  Current diabetic treatment includes oral agent (monotherapy)  She is compliant with treatment all of the time  The following portions of the patient's history were reviewed and updated as appropriate: allergies, current medications, past family history, past medical history, past social history, past surgical history and problem list     Review of Systems   Constitutional: Negative  HENT: Positive for congestion (productive), rhinorrhea and sore throat  Negative for ear pain and sinus pain  Eyes: Negative  Respiratory: Positive for cough  Cardiovascular: Negative  Gastrointestinal: Positive for diarrhea  Negative for nausea and vomiting  Endocrine: Negative      Genitourinary: Negative  Musculoskeletal: Negative  Skin: Negative  Allergic/Immunologic: Negative  Neurological: Negative  Hematological: Negative  Psychiatric/Behavioral: Negative  Current Outpatient Medications   Medication Sig Dispense Refill    lisinopril-hydrochlorothiazide (PRINZIDE,ZESTORETIC) 20-25 MG per tablet Take 1 tablet by mouth daily 90 tablet 3    albuterol (VENTOLIN HFA) 90 mcg/act inhaler Inhale 2 puffs every 6 (six) hours as needed for wheezing 1 Inhaler 1    atorvastatin (LIPITOR) 40 mg tablet Take 1 tablet (40 mg total) by mouth daily for 90 days 90 tablet 3    metFORMIN (GLUCOPHAGE) 500 mg tablet Take 2 tablets (1,000 mg total) by mouth 2 (two) times a day with meals for 90 days 360 tablet 3     No current facility-administered medications for this visit  Objective:    /64   Pulse 77   Temp 97 8 °F (36 6 °C)   Resp 14   Ht 5' 7 99" (1 727 m)   Wt 124 kg (274 lb 6 4 oz)   SpO2 97%   BMI 41 73 kg/m²        Physical Exam   Constitutional: She appears well-developed and well-nourished  HENT:   Head: Normocephalic and atraumatic  Right Ear: External ear normal    Left Ear: Tympanic membrane is injected  Nose: Nose normal    Mouth/Throat: Oropharynx is clear and moist    Eyes: Pupils are equal, round, and reactive to light  EOM are normal    Neck: Normal range of motion  Neck supple  Cardiovascular: Normal rate, regular rhythm, normal heart sounds and intact distal pulses  Pulses are no weak pulses  Pulses:       Dorsalis pedis pulses are 2+ on the right side, and 2+ on the left side  Posterior tibial pulses are 2+ on the right side, and 2+ on the left side  Pulmonary/Chest: Effort normal and breath sounds normal    Abdominal: Soft  Bowel sounds are normal    Musculoskeletal: Normal range of motion  Feet:   Right Foot:   Skin Integrity: Negative for ulcer, skin breakdown, erythema, warmth, callus or dry skin     Left Foot:   Skin Integrity: Negative for ulcer, skin breakdown, erythema, warmth, callus or dry skin  Neurological: She is alert  Skin: Skin is warm  Capillary refill takes less than 2 seconds  Psychiatric: She has a normal mood and affect  Her behavior is normal  Judgment and thought content normal    Nursing note and vitals reviewed  Patient's shoes and socks removed  Right Foot/Ankle   Right Foot Inspection  Skin Exam: skin normal and skin intact no dry skin, no warmth, no callus, no erythema, no maceration, no abnormal color, no pre-ulcer, no ulcer and no callus                          Toe Exam: ROM and strength within normal limits  Sensory     Proprioception: intact   Monofilament testing: intact  Vascular  Capillary refills: < 3 seconds  The right DP pulse is 2+  The right PT pulse is 2+  Left Foot/Ankle  Left Foot Inspection  Skin Exam: skin normal and skin intactno dry skin, no warmth, no erythema, no maceration, normal color, no pre-ulcer, no ulcer and no callus                         Toe Exam: ROM and strength within normal limits                   Sensory     Proprioception: intact  Monofilament: intact  Vascular  Capillary refills: < 3 seconds  The left DP pulse is 2+  The left PT pulse is 2+  Assign Risk Category:  No deformity present; No loss of protective sensation;  No weak pulses       Risk: 0       Rica Ruano,

## 2019-07-03 NOTE — ASSESSMENT & PLAN NOTE
Lab Results   Component Value Date    HGBA1C 6 6 (A) 07/03/2019       No results for input(s): POCGLU in the last 72 hours      Blood Sugar Average: Last 72 hrs:  stable on metformin

## 2020-03-03 DIAGNOSIS — E78.5 DYSLIPIDEMIA: ICD-10-CM

## 2020-03-03 DIAGNOSIS — E11.9 CONTROLLED TYPE 2 DIABETES MELLITUS WITHOUT COMPLICATION, WITHOUT LONG-TERM CURRENT USE OF INSULIN (HCC): ICD-10-CM

## 2020-03-03 DIAGNOSIS — I10 ESSENTIAL HYPERTENSION: ICD-10-CM

## 2020-03-03 RX ORDER — LISINOPRIL AND HYDROCHLOROTHIAZIDE 25; 20 MG/1; MG/1
TABLET ORAL
Qty: 30 TABLET | Refills: 0 | Status: SHIPPED | OUTPATIENT
Start: 2020-03-03 | End: 2020-05-06 | Stop reason: SDUPTHER

## 2020-03-03 RX ORDER — ATORVASTATIN CALCIUM 40 MG/1
40 TABLET, FILM COATED ORAL DAILY
Qty: 30 TABLET | Refills: 0 | Status: SHIPPED | OUTPATIENT
Start: 2020-03-03 | End: 2020-05-06 | Stop reason: SDUPTHER

## 2020-03-31 DIAGNOSIS — E11.9 CONTROLLED TYPE 2 DIABETES MELLITUS WITHOUT COMPLICATION, WITHOUT LONG-TERM CURRENT USE OF INSULIN (HCC): ICD-10-CM

## 2020-04-01 DIAGNOSIS — Z12.31 ENCOUNTER FOR SCREENING MAMMOGRAM FOR BREAST CANCER: ICD-10-CM

## 2020-04-01 DIAGNOSIS — Z12.4 SCREENING FOR CERVICAL CANCER: ICD-10-CM

## 2020-04-01 DIAGNOSIS — Z12.11 SCREENING FOR COLON CANCER: Primary | ICD-10-CM

## 2020-04-03 DIAGNOSIS — I10 ESSENTIAL HYPERTENSION: ICD-10-CM

## 2020-04-03 RX ORDER — LISINOPRIL AND HYDROCHLOROTHIAZIDE 25; 20 MG/1; MG/1
TABLET ORAL
Qty: 30 TABLET | Refills: 0 | OUTPATIENT
Start: 2020-04-03

## 2020-04-04 DIAGNOSIS — E78.5 DYSLIPIDEMIA: ICD-10-CM

## 2020-04-06 RX ORDER — ATORVASTATIN CALCIUM 40 MG/1
TABLET, FILM COATED ORAL
Qty: 30 TABLET | Refills: 0 | OUTPATIENT
Start: 2020-04-06

## 2020-05-02 DIAGNOSIS — E78.5 DYSLIPIDEMIA: ICD-10-CM

## 2020-05-02 DIAGNOSIS — I10 ESSENTIAL HYPERTENSION: ICD-10-CM

## 2020-05-04 RX ORDER — LISINOPRIL AND HYDROCHLOROTHIAZIDE 25; 20 MG/1; MG/1
TABLET ORAL
Qty: 30 TABLET | Refills: 0 | OUTPATIENT
Start: 2020-05-04

## 2020-05-04 RX ORDER — ATORVASTATIN CALCIUM 40 MG/1
TABLET, FILM COATED ORAL
Qty: 30 TABLET | Refills: 0 | OUTPATIENT
Start: 2020-05-04

## 2020-05-06 ENCOUNTER — OFFICE VISIT (OUTPATIENT)
Dept: FAMILY MEDICINE CLINIC | Facility: CLINIC | Age: 60
End: 2020-05-06
Payer: COMMERCIAL

## 2020-05-06 VITALS
OXYGEN SATURATION: 96 % | DIASTOLIC BLOOD PRESSURE: 72 MMHG | HEART RATE: 86 BPM | BODY MASS INDEX: 41.74 KG/M2 | RESPIRATION RATE: 18 BRPM | TEMPERATURE: 98.5 F | WEIGHT: 275.4 LBS | SYSTOLIC BLOOD PRESSURE: 148 MMHG | HEIGHT: 68 IN

## 2020-05-06 DIAGNOSIS — E11.9 CONTROLLED TYPE 2 DIABETES MELLITUS WITHOUT COMPLICATION, WITHOUT LONG-TERM CURRENT USE OF INSULIN (HCC): ICD-10-CM

## 2020-05-06 DIAGNOSIS — I10 ESSENTIAL HYPERTENSION: ICD-10-CM

## 2020-05-06 DIAGNOSIS — Z12.39 SCREENING FOR BREAST CANCER: ICD-10-CM

## 2020-05-06 DIAGNOSIS — E78.5 DYSLIPIDEMIA: ICD-10-CM

## 2020-05-06 DIAGNOSIS — I10 BENIGN HYPERTENSION: ICD-10-CM

## 2020-05-06 DIAGNOSIS — E11.9 TYPE 2 DIABETES MELLITUS WITHOUT COMPLICATION, WITHOUT LONG-TERM CURRENT USE OF INSULIN (HCC): ICD-10-CM

## 2020-05-06 DIAGNOSIS — E11.9 CONTROLLED TYPE 2 DIABETES MELLITUS WITHOUT COMPLICATION, WITHOUT LONG-TERM CURRENT USE OF INSULIN (HCC): Primary | ICD-10-CM

## 2020-05-06 PROBLEM — J40 BRONCHITIS: Status: RESOLVED | Noted: 2019-07-03 | Resolved: 2020-05-06

## 2020-05-06 LAB — SL AMB POCT HEMOGLOBIN AIC: 6.9 (ref ?–6.5)

## 2020-05-06 PROCEDURE — 83036 HEMOGLOBIN GLYCOSYLATED A1C: CPT | Performed by: FAMILY MEDICINE

## 2020-05-06 PROCEDURE — 3078F DIAST BP <80 MM HG: CPT | Performed by: FAMILY MEDICINE

## 2020-05-06 PROCEDURE — 3044F HG A1C LEVEL LT 7.0%: CPT | Performed by: FAMILY MEDICINE

## 2020-05-06 PROCEDURE — 3077F SYST BP >= 140 MM HG: CPT | Performed by: FAMILY MEDICINE

## 2020-05-06 PROCEDURE — 99214 OFFICE O/P EST MOD 30 MIN: CPT | Performed by: FAMILY MEDICINE

## 2020-05-06 PROCEDURE — 3008F BODY MASS INDEX DOCD: CPT | Performed by: FAMILY MEDICINE

## 2020-05-06 RX ORDER — LISINOPRIL AND HYDROCHLOROTHIAZIDE 25; 20 MG/1; MG/1
1 TABLET ORAL DAILY
Qty: 30 TABLET | Refills: 5 | Status: SHIPPED | OUTPATIENT
Start: 2020-05-06 | End: 2020-11-16

## 2020-05-06 RX ORDER — ATORVASTATIN CALCIUM 40 MG/1
40 TABLET, FILM COATED ORAL DAILY
Qty: 30 TABLET | Refills: 5 | Status: SHIPPED | OUTPATIENT
Start: 2020-05-06 | End: 2020-11-16

## 2020-06-04 ENCOUNTER — APPOINTMENT (EMERGENCY)
Dept: CT IMAGING | Facility: HOSPITAL | Age: 60
End: 2020-06-04
Payer: COMMERCIAL

## 2020-06-04 ENCOUNTER — APPOINTMENT (EMERGENCY)
Dept: ULTRASOUND IMAGING | Facility: HOSPITAL | Age: 60
End: 2020-06-04
Payer: COMMERCIAL

## 2020-06-04 ENCOUNTER — HOSPITAL ENCOUNTER (EMERGENCY)
Facility: HOSPITAL | Age: 60
Discharge: HOME | End: 2020-06-05
Attending: EMERGENCY MEDICINE | Admitting: EMERGENCY MEDICINE
Payer: COMMERCIAL

## 2020-06-04 ENCOUNTER — TELEPHONE (OUTPATIENT)
Dept: FAMILY MEDICINE CLINIC | Facility: CLINIC | Age: 60
End: 2020-06-04

## 2020-06-04 VITALS
DIASTOLIC BLOOD PRESSURE: 63 MMHG | SYSTOLIC BLOOD PRESSURE: 134 MMHG | TEMPERATURE: 98.4 F | WEIGHT: 269.18 LBS | HEART RATE: 74 BPM | BODY MASS INDEX: 40.8 KG/M2 | HEIGHT: 68 IN | RESPIRATION RATE: 14 BRPM | OXYGEN SATURATION: 99 %

## 2020-06-04 DIAGNOSIS — R91.1 PULMONARY NODULE: ICD-10-CM

## 2020-06-04 DIAGNOSIS — R93.89 THICKENED ENDOMETRIUM: ICD-10-CM

## 2020-06-04 DIAGNOSIS — R93.5 ABNORMAL ABDOMINAL CT SCAN: ICD-10-CM

## 2020-06-04 DIAGNOSIS — K76.9 LESION OF LIVER: ICD-10-CM

## 2020-06-04 DIAGNOSIS — R55 SYNCOPE: Primary | ICD-10-CM

## 2020-06-04 LAB
ALBUMIN SERPL BCP-MCNC: 3.7 G/DL (ref 3.5–5)
ALP SERPL-CCNC: 117 U/L (ref 46–116)
ALT SERPL W P-5'-P-CCNC: 80 U/L (ref 12–78)
ANION GAP SERPL CALCULATED.3IONS-SCNC: 13 MMOL/L (ref 4–13)
AST SERPL W P-5'-P-CCNC: 51 U/L (ref 5–45)
ATRIAL RATE: 85 BPM
BASOPHILS # BLD AUTO: 0.08 THOUSANDS/ΜL (ref 0–0.1)
BASOPHILS NFR BLD AUTO: 1 % (ref 0–1)
BILIRUB SERPL-MCNC: 1.07 MG/DL (ref 0.2–1)
BUN SERPL-MCNC: 20 MG/DL (ref 5–25)
CALCIUM SERPL-MCNC: 9.3 MG/DL (ref 8.3–10.1)
CHLORIDE SERPL-SCNC: 99 MMOL/L (ref 100–108)
CO2 SERPL-SCNC: 26 MMOL/L (ref 21–32)
CREAT SERPL-MCNC: 1.27 MG/DL (ref 0.6–1.3)
D DIMER PPP FEU-MCNC: 0.51 UG/ML FEU
EOSINOPHIL # BLD AUTO: 0.19 THOUSAND/ΜL (ref 0–0.61)
EOSINOPHIL NFR BLD AUTO: 2 % (ref 0–6)
ERYTHROCYTE [DISTWIDTH] IN BLOOD BY AUTOMATED COUNT: 15.3 % (ref 11.6–15.1)
GFR SERPL CREATININE-BSD FRML MDRD: 45 ML/MIN/1.73SQ M
GLUCOSE SERPL-MCNC: 124 MG/DL (ref 65–140)
GLUCOSE SERPL-MCNC: 165 MG/DL (ref 65–140)
HCT VFR BLD AUTO: 42.9 % (ref 34.8–46.1)
HGB BLD-MCNC: 13.4 G/DL (ref 11.5–15.4)
IMM GRANULOCYTES # BLD AUTO: 0.06 THOUSAND/UL (ref 0–0.2)
IMM GRANULOCYTES NFR BLD AUTO: 1 % (ref 0–2)
LYMPHOCYTES # BLD AUTO: 2.57 THOUSANDS/ΜL (ref 0.6–4.47)
LYMPHOCYTES NFR BLD AUTO: 24 % (ref 14–44)
MCH RBC QN AUTO: 28 PG (ref 26.8–34.3)
MCHC RBC AUTO-ENTMCNC: 31.2 G/DL (ref 31.4–37.4)
MCV RBC AUTO: 90 FL (ref 82–98)
MONOCYTES # BLD AUTO: 0.76 THOUSAND/ΜL (ref 0.17–1.22)
MONOCYTES NFR BLD AUTO: 7 % (ref 4–12)
NEUTROPHILS # BLD AUTO: 7.16 THOUSANDS/ΜL (ref 1.85–7.62)
NEUTS SEG NFR BLD AUTO: 65 % (ref 43–75)
NRBC BLD AUTO-RTO: 0 /100 WBCS
P AXIS: 67 DEGREES
PLATELET # BLD AUTO: 366 THOUSANDS/UL (ref 149–390)
PMV BLD AUTO: 10.2 FL (ref 8.9–12.7)
POTASSIUM SERPL-SCNC: 3.9 MMOL/L (ref 3.5–5.3)
PR INTERVAL: 140 MS
PROT SERPL-MCNC: 7.8 G/DL (ref 6.4–8.2)
QRS AXIS: 66 DEGREES
QRSD INTERVAL: 68 MS
QT INTERVAL: 370 MS
QTC INTERVAL: 440 MS
RBC # BLD AUTO: 4.79 MILLION/UL (ref 3.81–5.12)
SODIUM SERPL-SCNC: 138 MMOL/L (ref 136–145)
T WAVE AXIS: 71 DEGREES
TROPONIN I SERPL-MCNC: <0.02 NG/ML
VENTRICULAR RATE: 85 BPM
WBC # BLD AUTO: 10.82 THOUSAND/UL (ref 4.31–10.16)

## 2020-06-04 PROCEDURE — 96361 HYDRATE IV INFUSION ADD-ON: CPT

## 2020-06-04 PROCEDURE — 36415 COLL VENOUS BLD VENIPUNCTURE: CPT | Performed by: PSYCHIATRY & NEUROLOGY

## 2020-06-04 PROCEDURE — 99284 EMERGENCY DEPT VISIT MOD MDM: CPT

## 2020-06-04 PROCEDURE — 96360 HYDRATION IV INFUSION INIT: CPT

## 2020-06-04 PROCEDURE — 99283 EMERGENCY DEPT VISIT LOW MDM: CPT | Performed by: EMERGENCY MEDICINE

## 2020-06-04 PROCEDURE — 85379 FIBRIN DEGRADATION QUANT: CPT | Performed by: EMERGENCY MEDICINE

## 2020-06-04 PROCEDURE — 85025 COMPLETE CBC W/AUTO DIFF WBC: CPT | Performed by: PSYCHIATRY & NEUROLOGY

## 2020-06-04 PROCEDURE — 71275 CT ANGIOGRAPHY CHEST: CPT

## 2020-06-04 PROCEDURE — 84484 ASSAY OF TROPONIN QUANT: CPT | Performed by: EMERGENCY MEDICINE

## 2020-06-04 PROCEDURE — 80053 COMPREHEN METABOLIC PANEL: CPT | Performed by: PSYCHIATRY & NEUROLOGY

## 2020-06-04 PROCEDURE — 82948 REAGENT STRIP/BLOOD GLUCOSE: CPT

## 2020-06-04 PROCEDURE — 93005 ELECTROCARDIOGRAM TRACING: CPT

## 2020-06-04 PROCEDURE — 93010 ELECTROCARDIOGRAM REPORT: CPT | Performed by: INTERNAL MEDICINE

## 2020-06-04 PROCEDURE — 74177 CT ABD & PELVIS W/CONTRAST: CPT

## 2020-06-04 PROCEDURE — 76705 ECHO EXAM OF ABDOMEN: CPT

## 2020-06-04 RX ADMIN — SODIUM CHLORIDE 1000 ML: 0.9 INJECTION, SOLUTION INTRAVENOUS at 17:09

## 2020-06-04 RX ADMIN — IOHEXOL 100 ML: 350 INJECTION, SOLUTION INTRAVENOUS at 22:06

## 2020-06-05 ENCOUNTER — TELEPHONE (OUTPATIENT)
Dept: FAMILY MEDICINE CLINIC | Facility: CLINIC | Age: 60
End: 2020-06-05

## 2020-06-08 ENCOUNTER — OFFICE VISIT (OUTPATIENT)
Dept: FAMILY MEDICINE CLINIC | Facility: CLINIC | Age: 60
End: 2020-06-08
Payer: COMMERCIAL

## 2020-06-08 VITALS
TEMPERATURE: 98.6 F | BODY MASS INDEX: 40.86 KG/M2 | OXYGEN SATURATION: 95 % | DIASTOLIC BLOOD PRESSURE: 94 MMHG | HEART RATE: 101 BPM | WEIGHT: 269.6 LBS | HEIGHT: 68 IN | RESPIRATION RATE: 18 BRPM | SYSTOLIC BLOOD PRESSURE: 130 MMHG

## 2020-06-08 DIAGNOSIS — R91.1 RIGHT UPPER LOBE PULMONARY NODULE: ICD-10-CM

## 2020-06-08 DIAGNOSIS — R93.89 ENDOMETRIAL THICKENING ON ULTRASOUND: ICD-10-CM

## 2020-06-08 DIAGNOSIS — R13.10 DYSPHAGIA, UNSPECIFIED TYPE: ICD-10-CM

## 2020-06-08 DIAGNOSIS — E11.9 TYPE 2 DIABETES MELLITUS WITHOUT COMPLICATION, WITHOUT LONG-TERM CURRENT USE OF INSULIN (HCC): ICD-10-CM

## 2020-06-08 DIAGNOSIS — R55 VASOVAGAL SYNCOPE: Primary | ICD-10-CM

## 2020-06-08 DIAGNOSIS — K76.9 HEPATIC LESION: ICD-10-CM

## 2020-06-08 PROCEDURE — 99214 OFFICE O/P EST MOD 30 MIN: CPT | Performed by: FAMILY MEDICINE

## 2020-06-08 PROCEDURE — 1036F TOBACCO NON-USER: CPT | Performed by: FAMILY MEDICINE

## 2020-06-08 PROCEDURE — 3075F SYST BP GE 130 - 139MM HG: CPT | Performed by: FAMILY MEDICINE

## 2020-06-08 PROCEDURE — 3044F HG A1C LEVEL LT 7.0%: CPT | Performed by: FAMILY MEDICINE

## 2020-06-08 PROCEDURE — 3008F BODY MASS INDEX DOCD: CPT | Performed by: FAMILY MEDICINE

## 2020-06-08 PROCEDURE — 3080F DIAST BP >= 90 MM HG: CPT | Performed by: FAMILY MEDICINE

## 2020-06-08 RX ORDER — OMEPRAZOLE 40 MG/1
40 CAPSULE, DELAYED RELEASE ORAL
Qty: 30 CAPSULE | Refills: 5 | Status: SHIPPED | OUTPATIENT
Start: 2020-06-08 | End: 2020-11-12 | Stop reason: ALTCHOICE

## 2020-06-10 ENCOUNTER — TELEPHONE (OUTPATIENT)
Dept: FAMILY MEDICINE CLINIC | Facility: CLINIC | Age: 60
End: 2020-06-10

## 2020-06-16 ENCOUNTER — PATIENT OUTREACH (OUTPATIENT)
Dept: HEMATOLOGY ONCOLOGY | Facility: CLINIC | Age: 60
End: 2020-06-16

## 2020-06-16 ENCOUNTER — TELEPHONE (OUTPATIENT)
Dept: HEMATOLOGY ONCOLOGY | Facility: CLINIC | Age: 60
End: 2020-06-16

## 2020-06-16 DIAGNOSIS — K76.9 HEPATIC LESION: Primary | ICD-10-CM

## 2020-06-17 ENCOUNTER — PATIENT OUTREACH (OUTPATIENT)
Dept: HEMATOLOGY ONCOLOGY | Facility: CLINIC | Age: 60
End: 2020-06-17

## 2020-06-25 ENCOUNTER — PATIENT OUTREACH (OUTPATIENT)
Dept: HEMATOLOGY ONCOLOGY | Facility: CLINIC | Age: 60
End: 2020-06-25

## 2020-06-26 ENCOUNTER — HOSPITAL ENCOUNTER (OUTPATIENT)
Dept: MRI IMAGING | Facility: HOSPITAL | Age: 60
Discharge: HOME | End: 2020-06-26
Payer: COMMERCIAL

## 2020-06-26 ENCOUNTER — APPOINTMENT (OUTPATIENT)
Dept: LAB | Facility: CLINIC | Age: 60
End: 2020-06-26
Payer: COMMERCIAL

## 2020-06-26 ENCOUNTER — TRANSCRIBE ORDERS (OUTPATIENT)
Dept: LAB | Facility: CLINIC | Age: 60
End: 2020-06-26

## 2020-06-26 ENCOUNTER — HOSPITAL ENCOUNTER (OUTPATIENT)
Dept: ULTRASOUND IMAGING | Facility: HOSPITAL | Age: 60
Discharge: HOME | End: 2020-06-26
Payer: COMMERCIAL

## 2020-06-26 DIAGNOSIS — K76.9 HEPATIC LESION: ICD-10-CM

## 2020-06-26 DIAGNOSIS — R93.89 ENDOMETRIAL THICKENING ON ULTRASOUND: ICD-10-CM

## 2020-06-26 PROCEDURE — 74183 MRI ABD W/O CNTR FLWD CNTR: CPT

## 2020-06-26 PROCEDURE — A9585 GADOBUTROL INJECTION: HCPCS | Performed by: FAMILY MEDICINE

## 2020-06-26 PROCEDURE — 86301 IMMUNOASSAY TUMOR CA 19-9: CPT

## 2020-06-26 PROCEDURE — 76856 US EXAM PELVIC COMPLETE: CPT

## 2020-06-26 PROCEDURE — 36415 COLL VENOUS BLD VENIPUNCTURE: CPT

## 2020-06-26 PROCEDURE — 76830 TRANSVAGINAL US NON-OB: CPT

## 2020-06-26 RX ADMIN — GADOBUTROL 12 ML: 604.72 INJECTION INTRAVENOUS at 17:42

## 2020-06-27 LAB — CANCER AG19-9 SERPL-ACNC: 6 U/ML (ref 0–35)

## 2020-07-01 ENCOUNTER — HOSPITAL ENCOUNTER (OUTPATIENT)
Dept: RADIOLOGY | Facility: HOSPITAL | Age: 60
Discharge: HOME | End: 2020-07-01
Payer: COMMERCIAL

## 2020-07-01 VITALS
TEMPERATURE: 97.2 F | DIASTOLIC BLOOD PRESSURE: 66 MMHG | SYSTOLIC BLOOD PRESSURE: 129 MMHG | HEART RATE: 65 BPM | OXYGEN SATURATION: 98 % | BODY MASS INDEX: 42.38 KG/M2 | WEIGHT: 270 LBS | RESPIRATION RATE: 16 BRPM | HEIGHT: 67 IN

## 2020-07-01 DIAGNOSIS — K76.9 HEPATIC LESION: ICD-10-CM

## 2020-07-01 LAB
GLUCOSE SERPL-MCNC: 139 MG/DL (ref 65–140)
INR PPP: 0.95 (ref 0.84–1.19)
PROTHROMBIN TIME: 12 SECONDS (ref 11.6–14.5)

## 2020-07-01 PROCEDURE — 99152 MOD SED SAME PHYS/QHP 5/>YRS: CPT

## 2020-07-01 PROCEDURE — 88307 TISSUE EXAM BY PATHOLOGIST: CPT | Performed by: PATHOLOGY

## 2020-07-01 PROCEDURE — 82948 REAGENT STRIP/BLOOD GLUCOSE: CPT

## 2020-07-01 PROCEDURE — 88313 SPECIAL STAINS GROUP 2: CPT | Performed by: PATHOLOGY

## 2020-07-01 PROCEDURE — 47000 NEEDLE BIOPSY OF LIVER PERQ: CPT | Performed by: RADIOLOGY

## 2020-07-01 PROCEDURE — 47000 NEEDLE BIOPSY OF LIVER PERQ: CPT

## 2020-07-01 PROCEDURE — 76942 ECHO GUIDE FOR BIOPSY: CPT | Performed by: RADIOLOGY

## 2020-07-01 PROCEDURE — 88333 PATH CONSLTJ SURG CYTO XM 1: CPT | Performed by: PATHOLOGY

## 2020-07-01 PROCEDURE — 88334 PATH CONSLTJ SURG CYTO XM EA: CPT | Performed by: PATHOLOGY

## 2020-07-01 PROCEDURE — 88184 FLOWCYTOMETRY/ TC 1 MARKER: CPT | Performed by: RADIOLOGY

## 2020-07-01 PROCEDURE — 88185 FLOWCYTOMETRY/TC ADD-ON: CPT

## 2020-07-01 PROCEDURE — 85610 PROTHROMBIN TIME: CPT | Performed by: STUDENT IN AN ORGANIZED HEALTH CARE EDUCATION/TRAINING PROGRAM

## 2020-07-01 PROCEDURE — 99152 MOD SED SAME PHYS/QHP 5/>YRS: CPT | Performed by: RADIOLOGY

## 2020-07-01 PROCEDURE — 76942 ECHO GUIDE FOR BIOPSY: CPT

## 2020-07-01 PROCEDURE — 99153 MOD SED SAME PHYS/QHP EA: CPT

## 2020-07-01 RX ORDER — MIDAZOLAM HYDROCHLORIDE 2 MG/2ML
INJECTION, SOLUTION INTRAMUSCULAR; INTRAVENOUS CODE/TRAUMA/SEDATION MEDICATION
Status: COMPLETED | OUTPATIENT
Start: 2020-07-01 | End: 2020-07-01

## 2020-07-01 RX ORDER — FENTANYL CITRATE 50 UG/ML
INJECTION, SOLUTION INTRAMUSCULAR; INTRAVENOUS CODE/TRAUMA/SEDATION MEDICATION
Status: COMPLETED | OUTPATIENT
Start: 2020-07-01 | End: 2020-07-01

## 2020-07-01 RX ORDER — LIDOCAINE WITH 8.4% SOD BICARB 0.9%(10ML)
SYRINGE (ML) INJECTION CODE/TRAUMA/SEDATION MEDICATION
Status: COMPLETED | OUTPATIENT
Start: 2020-07-01 | End: 2020-07-01

## 2020-07-01 RX ORDER — SODIUM CHLORIDE 9 MG/ML
75 INJECTION, SOLUTION INTRAVENOUS CONTINUOUS
Status: DISCONTINUED | OUTPATIENT
Start: 2020-07-01 | End: 2020-07-02 | Stop reason: HOSPADM

## 2020-07-01 RX ORDER — OXYCODONE HYDROCHLORIDE AND ACETAMINOPHEN 5; 325 MG/1; MG/1
1 TABLET ORAL EVERY 4 HOURS PRN
Status: DISCONTINUED | OUTPATIENT
Start: 2020-07-01 | End: 2020-07-02 | Stop reason: HOSPADM

## 2020-07-01 RX ADMIN — FENTANYL CITRATE 25 MCG: 50 INJECTION INTRAMUSCULAR; INTRAVENOUS at 08:46

## 2020-07-01 RX ADMIN — MIDAZOLAM HYDROCHLORIDE 0.5 MG: 1 INJECTION, SOLUTION INTRAMUSCULAR; INTRAVENOUS at 08:51

## 2020-07-01 RX ADMIN — MIDAZOLAM HYDROCHLORIDE 0.5 MG: 1 INJECTION, SOLUTION INTRAMUSCULAR; INTRAVENOUS at 08:42

## 2020-07-01 RX ADMIN — OXYCODONE HYDROCHLORIDE AND ACETAMINOPHEN 1 TABLET: 5; 325 TABLET ORAL at 11:08

## 2020-07-01 RX ADMIN — FENTANYL CITRATE 25 MCG: 50 INJECTION INTRAMUSCULAR; INTRAVENOUS at 08:27

## 2020-07-01 RX ADMIN — FENTANYL CITRATE 50 MCG: 50 INJECTION INTRAMUSCULAR; INTRAVENOUS at 09:05

## 2020-07-01 RX ADMIN — FENTANYL CITRATE 25 MCG: 50 INJECTION INTRAMUSCULAR; INTRAVENOUS at 08:42

## 2020-07-01 RX ADMIN — MIDAZOLAM HYDROCHLORIDE 0.5 MG: 1 INJECTION, SOLUTION INTRAMUSCULAR; INTRAVENOUS at 09:05

## 2020-07-01 RX ADMIN — Medication 10 ML: at 08:42

## 2020-07-01 RX ADMIN — MIDAZOLAM HYDROCHLORIDE 1 MG: 1 INJECTION, SOLUTION INTRAMUSCULAR; INTRAVENOUS at 08:27

## 2020-07-01 RX ADMIN — MIDAZOLAM HYDROCHLORIDE 0.5 MG: 1 INJECTION, SOLUTION INTRAMUSCULAR; INTRAVENOUS at 08:23

## 2020-07-01 RX ADMIN — FENTANYL CITRATE 25 MCG: 50 INJECTION INTRAMUSCULAR; INTRAVENOUS at 08:23

## 2020-07-01 RX ADMIN — MIDAZOLAM HYDROCHLORIDE 0.5 MG: 1 INJECTION, SOLUTION INTRAMUSCULAR; INTRAVENOUS at 08:44

## 2020-07-01 NOTE — BRIEF OP NOTE (RAD/CATH)
IR IMAGE GUIDED BIOPSY/ASPIRATION LIVER Procedure Note    PATIENT NAME: Chiquita Ayers  : 1960  MRN: [id number]    Pre-op Diagnosis:   1  Hepatic lesion      Post-op Diagnosis:   1  Hepatic lesion        Surgeon:   Artur Escoto MD    Estimated Blood Loss: 1 mL    Findings: Successful segment 4 liver lesion biopsy  Specimens: 5 core needle samples (4 into formalin, 1 into RPMI)      Complications:  None    Anesthesia: Conscious sedation and Local    Artur Escoto MD     Date: 2020  Time: 9:12 AM

## 2020-07-01 NOTE — H&P
Interventional Radiology  History and Physical 7/1/2020     History of Present Illness:  56year old female with multiple liver lesions is referred for biopsy  Past Medical History:   Diagnosis Date    Bronchitis 7/3/2019    Diabetes mellitus ([address] )     Hyperlipidemia     Hypertension         No past surgical history on file       Social History     Tobacco Use   Smoking Status Never Smoker   Smokeless Tobacco Never Used        Social History     Substance and Sexual Activity   Alcohol Use Never    Frequency: Never        Social History     Substance and Sexual Activity   Drug Use Never        No Known Allergies    Current Outpatient Medications   Medication Sig Dispense Refill    atorvastatin (LIPITOR) 40 mg tablet Take 1 tablet (40 mg total) by mouth daily 30 tablet 5    lisinopril-hydrochlorothiazide (PRINZIDE,ZESTORETIC) 20-25 MG per tablet Take 1 tablet by mouth daily 30 tablet 5    metFORMIN (GLUCOPHAGE) 500 mg tablet Take 2 tablets (1,000 mg total) by mouth 2 (two) times a day with meals 120 tablet 5    omeprazole (PriLOSEC) 40 MG capsule Take 1 capsule (40 mg total) by mouth daily before breakfast 30 capsule 5    albuterol (VENTOLIN HFA) 90 mcg/act inhaler Inhale 2 puffs every 6 (six) hours as needed for wheezing (Patient not taking: Reported on 5/6/2020) 1 Inhaler 1     Current Facility-Administered Medications   Medication Dose Route Frequency Provider Last Rate Last Dose    sodium chloride 0 9 % infusion  75 mL/hr Intravenous Continuous Sumaya Mcclain MD              Objective:    Vitals:    07/01/20 0744   BP: 134/71   Pulse: 74   Resp: 18   Temp: (!) 97 2 °F (36 2 °C)   SpO2: 96%   Weight: 122 kg (270 lb)   Height: 5' 7" (1 702 m)        Physical Exam    Const: NAD  Abd: Soft    Lab Results   Component Value Date    WBC 10 82 (H) 06/04/2020    HGB 13 4 06/04/2020    HCT 42 9 06/04/2020    MCV 90 06/04/2020     06/04/2020     Lab Results   Component Value Date    INR 0 95 07/01/2020 PROTIME 12 0 07/01/2020     I have personally reviewed pertinent imaging and laboratory results  Assessment/Plan:  - Liver biopsy        This procedure has been fully reviewed with the patient and written informed consent has been obtained

## 2020-07-01 NOTE — DISCHARGE INSTRUCTIONS
Percutaneous Liver Biopsy   WHAT YOU NEED TO KNOW:   A PLB is a procedure to remove a sample of tissue from your liver  The sample can be sent to a lab and tested for liver disease, cancer, or infection  After the procedure you may have pain and bruising at the biopsy site  You may also have pain in your right shoulder  These symptoms should get better in 48 to 72 hours  DISCHARGE INSTRUCTIONS:     [address] and [facility] patients,  Contact Interventional Radiology at [phone] PATIENTS: Contact Interventional Radiology at [phone]   [facility] PATIENTS: Contact Interventional Radiology at [phone] if:    · Fever greater than 101 or chills  · You have severe pain in your abdomen  · Your abdomen is larger than usual and feels hard  · Your neck is more swollen and you have trouble swallowing  · You feel weak or dizzy  · Your heart is beating faster than usual    · Your pain does not get better after you take pain medicine  · Your wound is red, swollen, or draining pus  · You have nausea or are vomiting  · Your skin is itchy, swollen, or you have a rash  · You have questions or concerns about your condition or care  Medicines:   · Acetaminophen decreases pain and fever  It is available without a doctor's order  Acetaminophen can cause liver damage if not taken correctly  · Take your home medicine as directed  · Resume your normal diet  Small sips of flat soda will help with mild nausea  Self-care:   · Rest as directed  Do not play sports, exercise, or lift anything heavier than 5 pounds for up to 1 week  · Apply firm, steady pressure if bleeding occurs  A small amount of bleeding from your wound is possible  Apply pressure with a clean gauze or towel for 5 to 10 minutes  Call 911 if bleeding becomes heavy or does not stop  · Ask your healthcare provider when to take your blood thinner or antiplatelet medicine   You may need to wait 24 to 72 hours to take your medicine  This will prevent bleeding  Follow up with your healthcare provider as directed: Write down your questions so you remember to ask them during your visits

## 2020-07-01 NOTE — SEDATION DOCUMENTATION
Liver lesion biopsy done by Dr Anthony Munson  Patient tolerated well and VSS  No pain, only soreness per patient  Four hour bedrest per Dr Kath Estrella  Patient taken to [facility] post procedure and report given at bedside to RN

## 2020-07-03 LAB — SCAN RESULT: NORMAL

## 2020-07-07 ENCOUNTER — PATIENT OUTREACH (OUTPATIENT)
Dept: HEMATOLOGY ONCOLOGY | Facility: CLINIC | Age: 60
End: 2020-07-07

## 2020-07-07 DIAGNOSIS — K76.9 HEPATIC LESION: Primary | ICD-10-CM

## 2020-07-07 NOTE — PROGRESS NOTES
After reviewing the biopsy report and speaking with Oncology, requested that Dr Vasquez Tong order PET/CT and GI consult for King Alex, she very kindly ordered, scheduled appointments for King Davidjesus, called her to inform her of appointments and change in plan, no answer, left her a voicemail message asking her to please call me back and provided my contact information    Received call back from King Alex, informed her of above appointment dates/times/locations, we discussed reasons for doing PET/CT and GI consult and cancelling Med/Onc appointment, she was agreeable to all, also instructed her to have labs done prior to her visit with Dr Lorena Scanlon, she was appreciative for the information and the help, instructed her to call back with any questions or concerns

## 2020-07-11 ENCOUNTER — APPOINTMENT (OUTPATIENT)
Dept: LAB | Facility: CLINIC | Age: 60
End: 2020-07-11
Payer: COMMERCIAL

## 2020-07-11 DIAGNOSIS — I10 BENIGN HYPERTENSION: ICD-10-CM

## 2020-07-11 DIAGNOSIS — E11.9 CONTROLLED TYPE 2 DIABETES MELLITUS WITHOUT COMPLICATION, WITHOUT LONG-TERM CURRENT USE OF INSULIN (HCC): ICD-10-CM

## 2020-07-11 DIAGNOSIS — K76.9 HEPATIC LESION: ICD-10-CM

## 2020-07-11 DIAGNOSIS — E78.5 DYSLIPIDEMIA: ICD-10-CM

## 2020-07-11 LAB
AFP-TM SERPL-MCNC: 2.4 NG/ML (ref 0.5–8)
ALBUMIN SERPL BCP-MCNC: 3.5 G/DL (ref 3.5–5)
ALP SERPL-CCNC: 117 U/L (ref 46–116)
ALT SERPL W P-5'-P-CCNC: 65 U/L (ref 12–78)
ANION GAP SERPL CALCULATED.3IONS-SCNC: 9 MMOL/L (ref 4–13)
AST SERPL W P-5'-P-CCNC: 40 U/L (ref 5–45)
BASOPHILS # BLD AUTO: 0.07 THOUSANDS/ΜL (ref 0–0.1)
BASOPHILS NFR BLD AUTO: 1 % (ref 0–1)
BILIRUB SERPL-MCNC: 0.77 MG/DL (ref 0.2–1)
BUN SERPL-MCNC: 15 MG/DL (ref 5–25)
CALCIUM SERPL-MCNC: 8.9 MG/DL (ref 8.3–10.1)
CHLORIDE SERPL-SCNC: 104 MMOL/L (ref 100–108)
CHOLEST SERPL-MCNC: 136 MG/DL (ref 50–200)
CO2 SERPL-SCNC: 29 MMOL/L (ref 21–32)
CREAT SERPL-MCNC: 0.99 MG/DL (ref 0.6–1.3)
CREAT UR-MCNC: 507 MG/DL
EOSINOPHIL # BLD AUTO: 0.38 THOUSAND/ΜL (ref 0–0.61)
EOSINOPHIL NFR BLD AUTO: 5 % (ref 0–6)
ERYTHROCYTE [DISTWIDTH] IN BLOOD BY AUTOMATED COUNT: 15.1 % (ref 11.6–15.1)
GFR SERPL CREATININE-BSD FRML MDRD: 61 ML/MIN/1.73SQ M
GLUCOSE P FAST SERPL-MCNC: 154 MG/DL (ref 65–99)
HCT VFR BLD AUTO: 42 % (ref 34.8–46.1)
HDLC SERPL-MCNC: 56 MG/DL
HGB BLD-MCNC: 13.3 G/DL (ref 11.5–15.4)
IMM GRANULOCYTES # BLD AUTO: 0.04 THOUSAND/UL (ref 0–0.2)
IMM GRANULOCYTES NFR BLD AUTO: 1 % (ref 0–2)
LDLC SERPL CALC-MCNC: 49 MG/DL (ref 0–100)
LYMPHOCYTES # BLD AUTO: 3.23 THOUSANDS/ΜL (ref 0.6–4.47)
LYMPHOCYTES NFR BLD AUTO: 38 % (ref 14–44)
MCH RBC QN AUTO: 27.7 PG (ref 26.8–34.3)
MCHC RBC AUTO-ENTMCNC: 31.7 G/DL (ref 31.4–37.4)
MCV RBC AUTO: 88 FL (ref 82–98)
MICROALBUMIN UR-MCNC: 87.9 MG/L (ref 0–20)
MICROALBUMIN/CREAT 24H UR: 17 MG/G CREATININE (ref 0–30)
MONOCYTES # BLD AUTO: 0.58 THOUSAND/ΜL (ref 0.17–1.22)
MONOCYTES NFR BLD AUTO: 7 % (ref 4–12)
NEUTROPHILS # BLD AUTO: 4.13 THOUSANDS/ΜL (ref 1.85–7.62)
NEUTS SEG NFR BLD AUTO: 48 % (ref 43–75)
NRBC BLD AUTO-RTO: 0 /100 WBCS
PLATELET # BLD AUTO: 302 THOUSANDS/UL (ref 149–390)
PMV BLD AUTO: 9.6 FL (ref 8.9–12.7)
POTASSIUM SERPL-SCNC: 3.3 MMOL/L (ref 3.5–5.3)
PROT SERPL-MCNC: 7.6 G/DL (ref 6.4–8.2)
RBC # BLD AUTO: 4.8 MILLION/UL (ref 3.81–5.12)
SODIUM SERPL-SCNC: 142 MMOL/L (ref 136–145)
TRIGL SERPL-MCNC: 155 MG/DL
TSH SERPL DL<=0.05 MIU/L-ACNC: 2.87 UIU/ML (ref 0.36–3.74)
WBC # BLD AUTO: 8.43 THOUSAND/UL (ref 4.31–10.16)

## 2020-07-11 PROCEDURE — 36415 COLL VENOUS BLD VENIPUNCTURE: CPT

## 2020-07-11 PROCEDURE — 84443 ASSAY THYROID STIM HORMONE: CPT

## 2020-07-11 PROCEDURE — 80053 COMPREHEN METABOLIC PANEL: CPT

## 2020-07-11 PROCEDURE — 82043 UR ALBUMIN QUANTITATIVE: CPT

## 2020-07-11 PROCEDURE — 85025 COMPLETE CBC W/AUTO DIFF WBC: CPT

## 2020-07-11 PROCEDURE — 80061 LIPID PANEL: CPT

## 2020-07-11 PROCEDURE — 82105 ALPHA-FETOPROTEIN SERUM: CPT

## 2020-07-11 PROCEDURE — 86316 IMMUNOASSAY TUMOR OTHER: CPT

## 2020-07-11 PROCEDURE — 3060F POS MICROALBUMINURIA REV: CPT | Performed by: INTERNAL MEDICINE

## 2020-07-11 PROCEDURE — 82570 ASSAY OF URINE CREATININE: CPT

## 2020-07-13 ENCOUNTER — HOSPITAL ENCOUNTER (OUTPATIENT)
Dept: RADIOLOGY | Age: 60
Discharge: HOME | End: 2020-07-13
Payer: COMMERCIAL

## 2020-07-13 DIAGNOSIS — K76.9 HEPATIC LESION: ICD-10-CM

## 2020-07-13 LAB — GLUCOSE SERPL-MCNC: 113 MG/DL (ref 65–140)

## 2020-07-13 PROCEDURE — 82948 REAGENT STRIP/BLOOD GLUCOSE: CPT

## 2020-07-13 PROCEDURE — A9552 F18 FDG: HCPCS

## 2020-07-13 PROCEDURE — 78815 PET IMAGE W/CT SKULL-THIGH: CPT

## 2020-07-14 LAB — CGA SERPL-MCNC: 888.8 NG/ML (ref 0–101.8)

## 2020-07-20 ENCOUNTER — PREP FOR PROCEDURE (OUTPATIENT)
Dept: GASTROENTEROLOGY | Facility: CLINIC | Age: 60
End: 2020-07-20

## 2020-07-20 ENCOUNTER — OFFICE VISIT (OUTPATIENT)
Dept: GASTROENTEROLOGY | Facility: CLINIC | Age: 60
End: 2020-07-20
Payer: COMMERCIAL

## 2020-07-20 VITALS
TEMPERATURE: 98.9 F | BODY MASS INDEX: 42.53 KG/M2 | HEIGHT: 67 IN | WEIGHT: 271 LBS | HEART RATE: 73 BPM | SYSTOLIC BLOOD PRESSURE: 137 MMHG | DIASTOLIC BLOOD PRESSURE: 85 MMHG

## 2020-07-20 DIAGNOSIS — K76.9 HEPATIC LESION: Primary | ICD-10-CM

## 2020-07-20 DIAGNOSIS — Z20.822 ENCOUNTER FOR LABORATORY TESTING FOR COVID-19 VIRUS: ICD-10-CM

## 2020-07-20 PROCEDURE — 3044F HG A1C LEVEL LT 7.0%: CPT | Performed by: INTERNAL MEDICINE

## 2020-07-20 PROCEDURE — 99205 OFFICE O/P NEW HI 60 MIN: CPT | Performed by: INTERNAL MEDICINE

## 2020-07-20 PROCEDURE — U0003 INFECTIOUS AGENT DETECTION BY NUCLEIC ACID (DNA OR RNA); SEVERE ACUTE RESPIRATORY SYNDROME CORONAVIRUS 2 (SARS-COV-2) (CORONAVIRUS DISEASE [COVID-19]), AMPLIFIED PROBE TECHNIQUE, MAKING USE OF HIGH THROUGHPUT TECHNOLOGIES AS DESCRIBED BY CMS-2020-01-R: HCPCS | Performed by: OBSTETRICS & GYNECOLOGY

## 2020-07-20 NOTE — PROGRESS NOTES
[facility] [facility] - Outpatient Consultation  Yanci Courtney 56 y o  female MRN: [id number]  Encounter: [id number]          ASSESSMENT AND PLAN:      1  Hepatic lesion  Patient has numerous hepatic lesions concerning for neuroendocrine vs  Metastatic disease  She also has a lymph node in the portocaval area measuring up to 2 cm which can be worrisome    She also has elevated chromogranin a which arises suspicion for pancreatic neuroendocrine tumor  For this reason EUS will be helpful  Chromogranin a can be elevated with proton pump inhibitor use  Will have her stop omeprazole  Will also order blood work including repeat chromogranin A after she has remained off her PPI, gastrin levels, 24 hour urine 5-HIAA, Vasoactive intestinal peptide, pancreatic polypeptide  Given that she has no symptoms this could also represent a non-function PNET  - Ambulatory referral to Gastroenterology  - Gastrin; Future  - 5 HIAA, urine, quantitative, 24 hour; Future  - PANCREATIC POLYPEPTIDE; Future  - Vasoactive intestinal peptide (VIP); Future  - Chromogranin A; Future  - EUS Upper; Future    The rationale for endoscopic ultrasound with possible biopsy/fine-needle aspiration and IV sedation as well as all risks, benefits, and alternatives were discussed with the patient in detail  Risks including but not limited to perforation, bleeding, need for blood transfusion or emergent surgery, and missed neoplasm were reviewed in detail with the patient  The patient demonstrates full understanding and wishes to proceed  ______________________________________________________________    HPI:  Yanci Courtney is a 56y o  year old female who presents to the office for evaluation of liver lesions  During the last two weeks of May she was feeling nausous  She would take her medications in the morning and she couldn't take a big gulp or eat as fast  Started having soups   She hardly was tolerating any substantial food at the time and she ended up feeling dehydrated so on June 4th she presented to the ED where they noted that her liver had hypoechoic lesions up to 4 2 cm on CT/PE protocol  I have personally viewed the images in PACS  She has several lesions throughout her liver seen on follow up MRI and most recent PET/CT  She had biopsy of the liver, which was targetted to a lesion but came back as hepatic steatosis with no malignant/atypical features  Currently, she remains asymptomatic and has not had any weakness, weight loss, sleepiness, constitutional symptoms  She does not noted any jaundice or pruritus  She has not had discoloration of her stools or steatorrhea  Her CA 19-9, and AFP have been normal   She does have significantly elevated chromogranin A of 888  They have a past medical history of gastroesophageal reflux disease, hypertension, hyperlipidemia and follows with their PCP Dr Sanam Blackburn, DO    REVIEW OF SYSTEMS:    CONSTITUTIONAL: Denies any fever, chills, rigors, and weight loss  HEENT: No earache or tinnitus  Denies hearing loss or visual disturbances  CARDIOVASCULAR: No chest pain or palpitations  RESPIRATORY: Denies any cough, hemoptysis, shortness of breath or dyspnea on exertion  GASTROINTESTINAL: As noted in the History of Present Illness  GENITOURINARY: No problems with urination  Denies any hematuria or dysuria  NEUROLOGIC: No dizziness or vertigo, denies headaches  MUSCULOSKELETAL: Denies any muscle or joint pain  SKIN: Denies skin rashes or itching  ENDOCRINE: Denies excessive thirst  Denies intolerance to heat or cold  PSYCHOSOCIAL: Denies depression or anxiety  Denies any recent memory loss         Historical Information   Past Medical History:   Diagnosis Date    Bronchitis 7/3/2019    Diabetes mellitus ([facility] [address] )     Hyperlipidemia     Hypertension      Past Surgical History:   Procedure Laterality Date    COLONOSCOPY      IR IMAGE GUIDED BIOPSY/ASPIRATION LIVER  7/1/2020     Social History   Social History     Substance and Sexual Activity   Alcohol Use Never    Frequency: Never     Social History     Substance and Sexual Activity   Drug Use Never     Social History     Tobacco Use   Smoking Status Never Smoker   Smokeless Tobacco Never Used     Family History   Problem Relation Age of Onset    Diabetes Father        Meds/Allergies       Current Outpatient Medications:     albuterol (VENTOLIN HFA) 90 mcg/act inhaler    atorvastatin (LIPITOR) 40 mg tablet    lisinopril-hydrochlorothiazide (PRINZIDE,ZESTORETIC) 20-25 MG per tablet    metFORMIN (GLUCOPHAGE) 500 mg tablet    omeprazole (PriLOSEC) 40 MG capsule    No Known Allergies        Objective     Blood pressure 137/85, pulse 73, temperature 98 9 °F (37 2 °C), temperature source Tympanic, height 5' 7" (1 702 m), weight 123 kg (271 lb)  Body mass index is 42 44 kg/m²  PHYSICAL EXAM:      General Appearance:   Alert, cooperative, no distress   HEENT:   Normocephalic, atraumatic, anicteric  Lungs:   Equal chest rise and unlabored breathing, normal cough   Heart:   No visualized JVD   Abdomen:   Soft, non-tender, non-distended; no masses, no organomegaly    Genitalia:   Deferred    Rectal:   Deferred    Extremities:  No cyanosis, clubbing or edema    Pulses:  Musculoskeletal:  2+ and symmetric  Normal range of motion visualized    Skin:  Neuro:  No jaundice, rashes, or lesions   Alert and appropriate       Lab Results:   No visits with results within 1 Day(s) from this visit     Latest known visit with results is:   Hospital Outpatient Visit on 07/13/2020   Component Date Value    POC Glucose 07/13/2020 113          Radiology Results:   Mri Abdomen W Wo Contrast    Result Date: 6/30/2020  Narrative: MRI - ABDOMEN - WITH AND WITHOUT CONTRAST INDICATION:  Liver lesion COMPARISON: June 4, 2020, CTA chest TECHNIQUE:  The following pulse sequences were obtained prior to and following the administration of intravenous contrast:     Coronal and axial T2 with TE of 90 and 180 respectively, axial T2 with fat saturation, axial FIESTA fat-sat, axial T1-weighted in-and-out-of phase, axial DWI/ADC, precontrast axial T1 with fat saturation, post-contrast dynamic axial T1 with fat saturation at 20, 70, and 180 seconds, coronal T1  with fat saturation and 7 minute delayed axial T1 with fat saturation  IV Contrast:  12 mL of gadobutrol injection (MULTI-DOSE) FINDINGS: LOWER CHEST:   Unremarkable  LIVER: The liver is enlarged  There is diffuse hepatic steatosis  There are in numerable T1 hypointense and T2 hyperintense lesions in the liver parenchyma  These lesions are diffusion restricting  These lesions demonstrate hyperenhancement  There is no washout noted  These lesions demonstrate pseudocapsule some of the lesions demonstrate interspersed T2 hyperintense area  A lesion at the junction of the segment 4, 8 measures 2 9 x 2 5 cm  Segment 6 lesion in image 337 measures 3 1 x 3 cm   An additional lesion in the segment 5 measures 4 2 x 4 3 cm  These lesions are suspicious for a hypervascular metastatic disease  The typical primaries which is enlarged and hypervascular metastatic lesion include renal neoplasia, thyroid neoplasia, neuroendocrine neoplasia, melanoma, sarcoma BILE DUCTS: No intrahepatic or extrahepatic bile duct dilation  GALLBLADDER:  Normal  PANCREAS: There is no pancreatic ductal dilation and there is no pancreatic atrophy ADRENAL GLANDS:  Adrenal glands appear unremarkable SPLEEN:  Normal  KIDNEYS/PROXIMAL URETERS: No hydroureteronephrosis  No suspicious renal mass  Left renal cyst seen Right renal cyst seen BOWEL:   No dilated loops of bowel  PERITONEUM/RETROPERITONEUM: There is a diffusion restricting rounded density in the portacaval region, which is inseparable from the adjacent pancreas  This demonstrates imaging features similar to the liver lesion     This is inseparable from the adjacent to pancreatic head  This may represent a metastatic lymph node, alternatively this may represent a exophytic lesion from the pancreatic head LYMPH NODES: No small para-aortic lymph nodes seen in the upper abdomen which do not meet the criteria for pathologic enlargement VASCULAR STRUCTURES:  No aneurysm  ABDOMINAL WALL:  Unremarkable  OSSEOUS STRUCTURES:  No suspicious osseous lesion  Impression: Multiple hyperenhancing lesions within the liver parenchyma which is a demonstrate the diffusion restriction, pseudocapsule  These are indeterminate but suspicious for hypervascular metastatic disease  Consider biochemical evaluation to exclude neuroendocrine neoplasia 2 1 x 2 1 cm diffusion restricting focal lesion in the portacaval region may represent a metastatic lymph node, alternatively an exophytic lesion from the pancreatic head is also a possibility  This demonstrates imaging features similar to the hepatic metastatic lesion  No renal mass Workstation performed: [id number]     Nm Pet Ct Skull Base To Mid Thigh    Result Date: 7/14/2020  Narrative: PET/CT SCAN INDICATION: Liver metastasis suspected  K76 9: Liver disease, unspecified MODIFIER: PI COMPARISON: MRI abdomen 6/26/2020, CT chest abdomen pelvis 6/4/2020 and additional priors CELL TYPE:  Hepatic steatosis, negative for malignancy, liver biopsy 7/1/2020 TECHNIQUE:   11 9 mCi F-18-FDG administered IV  Multiplanar attenuation corrected and non attenuation corrected PET images were acquired 60 minutes post injection  Contiguous, low dose, axial CT sections were obtained from the skull base through the femurs   Intravenous contrast material was not utilized  This examination, like all CT scans performed in the [facility], was performed utilizing techniques to minimize radiation dose exposure, including the use of iterative reconstruction and automated exposure control   Fasting serum glucose: 113 mg/dl FINDINGS: VISUALIZED BRAIN:   No acute abnormalities are seen  HEAD/NECK:   Focal radiotracer uptake at the right maxilla inferiorly, appears to be related to the dentition, SUV max of 8 4, likely infectious or inflammatory  There is a corresponding dental henri of the 2nd molar here on CT  There is a physiologic distribution of FDG  No FDG avid cervical adenopathy is seen  CT images: Thyroid gland is mildly heterogeneous with suggestion of a subcentimeter nodule in the left  No focal radiotracer uptake here  CHEST:   No FDG avid soft tissue lesions are seen  Prior chest CT exam notes a 3 mm nodule in the right upper lobe, not seen on the current exam may be related to differences in slice thickness  Regardless no suspicious focal radiotracer uptake in this region though this would be likely too small to characterize  CT images: Unremarkable  ABDOMEN:   Multiple slightly hyperdense hepatic lesions again identified in the liver, stable from recent imaging  These largely demonstrate no suspicious focal radiotracer uptake  Patchy focal radiotracer uptake in a hepatic lesion bridging the left and right lobes anteriorly, SUV max of 4 4  Underlying lesion here measures up to 6 8 cm in size image 133 series 3  This may be two contiguous lesions based on prior CT  A lesion in the right lobe inferiorly does demonstrate a small area of focal radiotracer uptake, SUV max of 3 6  This lesion measures up to 5 2 cm in size image 154 series 3  Normal liver background demonstrates SUV max of 3 1  No focal radiotracer uptake in the enlarged portacaval lymph nodes  A portacaval lymph node measures 2 3 x 1 9 cm image 140 series 3  SUV max here is 1 9, indistinguishable from background  Cluster of mesenteric lymph nodes noted centrally without significant focal radiotracer uptake, SUV max is 1 4  This cluster measures approximately 1 9 x 1 8 cm image 173 series 3 with associated coarse calcification   Fairly extensive bowel activity likely physiologic  CT images: Overall low-attenuation liver suggests fatty infiltration  Stable renal cysts  PELVIS: No FDG avid soft tissue lesions are seen  CT images: Mildly lobular uterus may be related to underlying fibroids  OSSEOUS STRUCTURES: No FDG avid lesions are seen  CT images: Scattered degenerative spurring of the spine  Impression: 1  Multiple hepatic lesions again identified, largely without significant FDG uptake except for a few lesions as noted above  Malignancy should be excluded  2  The mesenteric nodule with coarse calcification also does not demonstrate focal radiotracer uptake  No significant radiotracer uptake in enlarged portacaval lymph nodes  3  Well-differentiated carcinoid tumors, which would be in the differential given the imaging findings, may not demonstrate significant FDG uptake  Correlate clinically  Consider gallium-68 Dotatate PET/CT for further evaluation as warranted  4   Focal radiotracer uptake at the right maxilla inferiorly, appears to be related to the dentition  There is a corresponding dental henri of the 2nd molar here on CT  Correlate with dental exam for infection/inflammation  Workstation performed: [id number]     Ir Image Guided Biopsy/aspiration Liver    Result Date: 7/1/2020  Narrative: EXAMINATION: Liver biopsy INDICATION: Multiple liver lesions CONTRAST: N/A FLUOROSCOPY TIME:   N/A IMAGES:  7 ANESTHESIA: Moderate sedation local lidocaine PROCEDURE:  The patient was identified verbally and by wristband  Timeout was performed  Informed consent was obtained  Following obtaining informed consent, the patient was prepped and draped in the usual sterile fashion  All elements of maximal sterile barrier technique, cap and mask and sterile gloves and sterile drape and hand hygiene and 2% chlorhexidine for cutaneous antisepsis  Sterile ultrasound technique with sterile gel and sterile probe covers was also utilized   Under ultrasound guidance, a 17-gauge coaxial introducer needle was advanced into a segment 4 liver lesion  An 18-gauge BioPince needle was used to obtain 5 core needle samples, 4 of which were placed in formalin and one placed into RPMI  Pathologist was present to confirm adequacy of samples  D-Stat hemostatic agent was instilled through the introducer needle during its removal   Hemostasis was achieved and bandage was applied  The patient tolerated the procedure well without complication  The patient left the IR department in stable condition  Findings: Successful segment 4 liver lesion core needle biopsy  Impression: Impression: Successful ultrasound-guided percutaneous liver biopsy  Workstation performed: [id number]     Us Pelvis Complete W Transvaginal    Result Date: 6/30/2020  Narrative: PELVIC ULTRASOUND, COMPLETE INDICATION:  56years old  R93 89: Abnormal findings on diagnostic imaging of other specified body structures  Previous CT study suspicious for endometrial thickening COMPARISON: CT 6/4/2020  TECHNIQUE:   Transabdominal pelvic ultrasound was performed in sagittal and transverse planes with a curvilinear transducer  Additional transvaginal imaging was performed to better evaluate the endometrium and ovaries  Imaging included volumetric sweeps as well as traditional still imaging technique  FINDINGS: UTERUS: The uterus measures 7 3 x 3 5 x 4 1 cm  Uterine myometrium is diffusely heterogeneous  Within the lower uterine segment there is a 1 3 x 1 0 x 1 4 cm fibroid  The cervix shows no suspicious abnormality  ENDOMETRIUM:  Endometrium was somewhat difficult to visualize on today's examination but appears patent be approximately 6 mm  This is borderline prominent in a postmenopausal patient  Neither ovary could be visualized on today's examination either transabdominally or transvaginally  No adnexal cyst appreciated    No free fluid     Impression:  Heterogeneous uterine myometrial tissue with at least one discrete lower uterine segment 1 4 cm fibroid Endometrial thickness is 6 mm  This is borderline thickened than a postmenopausal patient  Patient denies postmenopausal bleeding  Consider reassessment ultrasound in approximately 6 months time for reassessment  The examination demonstrates a finding requiring imaging follow-up and was logged as such in [address] [place] [address] Rd   Workstation performed: [id number]

## 2020-07-20 NOTE — PATIENT INSTRUCTIONS
EUS scheduled on 7/28/20 with Dr Dudley at [facility]  I gave patient verbal instructions/paper work given  Patient aware to complete covid test today

## 2020-07-21 ENCOUNTER — TELEPHONE (OUTPATIENT)
Dept: FAMILY MEDICINE CLINIC | Facility: CLINIC | Age: 60
End: 2020-07-21

## 2020-07-21 LAB
INPATIENT: NORMAL
SARS-COV-2 RNA SPEC QL NAA+PROBE: NOT DETECTED

## 2020-07-28 ENCOUNTER — ANESTHESIA (OUTPATIENT)
Dept: GASTROENTEROLOGY | Facility: HOSPITAL | Age: 60
End: 2020-07-28

## 2020-07-28 ENCOUNTER — HOSPITAL ENCOUNTER (OUTPATIENT)
Dept: GASTROENTEROLOGY | Facility: HOSPITAL | Age: 60
Setting detail: OUTPATIENT SURGERY
Discharge: HOME | End: 2020-07-28
Attending: INTERNAL MEDICINE | Admitting: INTERNAL MEDICINE
Payer: COMMERCIAL

## 2020-07-28 ENCOUNTER — ANESTHESIA EVENT (OUTPATIENT)
Dept: GASTROENTEROLOGY | Facility: HOSPITAL | Age: 60
End: 2020-07-28

## 2020-07-28 VITALS
RESPIRATION RATE: 18 BRPM | OXYGEN SATURATION: 95 % | HEIGHT: 67 IN | HEART RATE: 65 BPM | WEIGHT: 270 LBS | TEMPERATURE: 98.9 F | BODY MASS INDEX: 42.38 KG/M2 | SYSTOLIC BLOOD PRESSURE: 127 MMHG | DIASTOLIC BLOOD PRESSURE: 72 MMHG

## 2020-07-28 DIAGNOSIS — K76.9 HEPATIC LESION: ICD-10-CM

## 2020-07-28 PROCEDURE — 43238 EGD US FINE NEEDLE BX/ASPIR: CPT | Performed by: INTERNAL MEDICINE

## 2020-07-28 PROCEDURE — 88172 CYTP DX EVAL FNA 1ST EA SITE: CPT | Performed by: PATHOLOGY

## 2020-07-28 PROCEDURE — 88342 IMHCHEM/IMCYTCHM 1ST ANTB: CPT | Performed by: PATHOLOGY

## 2020-07-28 PROCEDURE — 88173 CYTOPATH EVAL FNA REPORT: CPT | Performed by: PATHOLOGY

## 2020-07-28 PROCEDURE — 88341 IMHCHEM/IMCYTCHM EA ADD ANTB: CPT | Performed by: PATHOLOGY

## 2020-07-28 PROCEDURE — 88305 TISSUE EXAM BY PATHOLOGIST: CPT | Performed by: PATHOLOGY

## 2020-07-28 RX ORDER — DEXAMETHASONE SODIUM PHOSPHATE 10 MG/ML
INJECTION, SOLUTION INTRAMUSCULAR; INTRAVENOUS AS NEEDED
Status: DISCONTINUED | OUTPATIENT
Start: 2020-07-28 | End: 2020-07-28 | Stop reason: SURG

## 2020-07-28 RX ORDER — SUCCINYLCHOLINE/SOD CL,ISO/PF 100 MG/5ML
SYRINGE (ML) INTRAVENOUS AS NEEDED
Status: DISCONTINUED | OUTPATIENT
Start: 2020-07-28 | End: 2020-07-28 | Stop reason: SURG

## 2020-07-28 RX ORDER — FENTANYL CITRATE 50 UG/ML
INJECTION, SOLUTION INTRAMUSCULAR; INTRAVENOUS AS NEEDED
Status: DISCONTINUED | OUTPATIENT
Start: 2020-07-28 | End: 2020-07-28 | Stop reason: SURG

## 2020-07-28 RX ORDER — SODIUM CHLORIDE 9 MG/ML
INJECTION, SOLUTION INTRAVENOUS CONTINUOUS PRN
Status: DISCONTINUED | OUTPATIENT
Start: 2020-07-28 | End: 2020-07-28 | Stop reason: SURG

## 2020-07-28 RX ORDER — SODIUM CHLORIDE, SODIUM LACTATE, POTASSIUM CHLORIDE, CALCIUM CHLORIDE 600; 310; 30; 20 MG/100ML; MG/100ML; MG/100ML; MG/100ML
75 INJECTION, SOLUTION INTRAVENOUS CONTINUOUS
Status: CANCELLED | OUTPATIENT
Start: 2020-07-28

## 2020-07-28 RX ORDER — PROPOFOL 10 MG/ML
INJECTION, EMULSION INTRAVENOUS AS NEEDED
Status: DISCONTINUED | OUTPATIENT
Start: 2020-07-28 | End: 2020-07-28 | Stop reason: SURG

## 2020-07-28 RX ORDER — ONDANSETRON 2 MG/ML
INJECTION INTRAMUSCULAR; INTRAVENOUS AS NEEDED
Status: DISCONTINUED | OUTPATIENT
Start: 2020-07-28 | End: 2020-07-28 | Stop reason: SURG

## 2020-07-28 RX ORDER — LIDOCAINE HYDROCHLORIDE 10 MG/ML
INJECTION, SOLUTION EPIDURAL; INFILTRATION; INTRACAUDAL; PERINEURAL AS NEEDED
Status: DISCONTINUED | OUTPATIENT
Start: 2020-07-28 | End: 2020-07-28 | Stop reason: SURG

## 2020-07-28 RX ADMIN — DEXAMETHASONE SODIUM PHOSPHATE 10 MG: 10 INJECTION, SOLUTION INTRAMUSCULAR; INTRAVENOUS at 12:24

## 2020-07-28 RX ADMIN — Medication 100 MG: at 12:24

## 2020-07-28 RX ADMIN — PROPOFOL 200 MG: 10 INJECTION, EMULSION INTRAVENOUS at 12:24

## 2020-07-28 RX ADMIN — ONDANSETRON 4 MG: 2 INJECTION INTRAMUSCULAR; INTRAVENOUS at 12:24

## 2020-07-28 RX ADMIN — LIDOCAINE HYDROCHLORIDE 50 MG: 10 INJECTION, SOLUTION EPIDURAL; INFILTRATION; INTRACAUDAL; PERINEURAL at 12:24

## 2020-07-28 RX ADMIN — PHENYLEPHRINE HYDROCHLORIDE 100 MCG: 10 INJECTION INTRAVENOUS at 12:38

## 2020-07-28 RX ADMIN — FENTANYL CITRATE 100 MCG: 50 INJECTION, SOLUTION INTRAMUSCULAR; INTRAVENOUS at 12:24

## 2020-07-28 RX ADMIN — SODIUM CHLORIDE: 0.9 INJECTION, SOLUTION INTRAVENOUS at 12:19

## 2020-07-28 NOTE — ANESTHESIA PREPROCEDURE EVALUATION
Review of Systems/Medical History  Patient summary reviewed  Chart reviewed  No history of anesthetic complications     Cardiovascular  Hyperlipidemia, Hypertension ,    Pulmonary       GI/Hepatic    Liver disease (lesion) ,             Endo/Other  Diabetes type 2 ,   Obesity  morbid obesity   GYN       Hematology   Musculoskeletal       Neurology   Psychology           Physical Exam    Airway    Mallampati score: II  TM Distance: >3 FB  Neck ROM: full     Dental       Cardiovascular  Rhythm: regular, Rate: normal,     Pulmonary  Breath sounds clear to auscultation,     Other Findings        Anesthesia Plan  ASA Score- 2     Anesthesia Type- general with ASA Monitors  Additional Monitors:   Airway Plan: ETT  Plan Factors-    Induction- intravenous  Postoperative Plan-     Informed Consent- Anesthetic plan and risks discussed with patient  I personally reviewed this patient with the CRNA  Discussed and agreed on the Anesthesia Plan with the CRNA  Orlando Regan

## 2020-07-28 NOTE — ANESTHESIA POSTPROCEDURE EVALUATION
Post-Op Assessment Note    CV Status:  Stable  Pain Score: 0    Pain management: adequate     Mental Status:  Arousable   Hydration Status:  Stable   PONV Controlled:  None   Airway Patency:  Patent   Post Op Vitals Reviewed: Yes      Staff: AnesthesiologistCONSUELO           /63 (07/28/20 1319)    Temp 98 9 °F (37 2 °C) (07/28/20 1319)    Pulse 69 (07/28/20 1319)   Resp 18 (07/28/20 1319)    SpO2 100 % (07/28/20 1319)

## 2020-07-28 NOTE — H&P
History and Physical -  [facility]  Yi Matthews 56 y o  female MRN: [id number]                  HPI: Yi Matthews is a 56y o  year old female who presents for EUS for assessment of liver lesions and lymph nodes      REVIEW OF SYSTEMS: Per the HPI, and otherwise unremarkable  Historical Information   Past Medical History:   Diagnosis Date    Bronchitis 7/3/2019    Diabetes mellitus ([address] )     Hyperlipidemia     Hypertension      Past Surgical History:   Procedure Laterality Date    APPENDECTOMY      age 9     COLONOSCOPY      IR IMAGE GUIDED BIOPSY/ASPIRATION LIVER  7/1/2020    TONSILLECTOMY      age 7      Social History   Social History     Substance and Sexual Activity   Alcohol Use Never    Frequency: Never     Social History     Substance and Sexual Activity   Drug Use Never     Social History     Tobacco Use   Smoking Status Never Smoker   Smokeless Tobacco Never Used     Family History   Problem Relation Age of Onset    Diabetes Father        Meds/Allergies       (Not in a hospital admission)    No Known Allergies    Objective     Blood pressure 142/75, pulse 68, temperature 98 7 °F (37 1 °C), temperature source Tympanic, resp  rate 16, height 5' 7" (1 702 m), weight 122 kg (270 lb), SpO2 95 %  PHYSICAL EXAM    Gen: NAD  CV: RRR  CHEST: Clear  ABD: soft, NT/ND  EXT: no edema      ASSESSMENT/PLAN:  This is a 56y o  year old female here for EUS, the patient is stable and optimized for the procedure, we reviewed risk and benefits   Risk include but not limited to infection, bleeding, perforation and missing a lesion    PLAN:   Procedure: EUS

## 2020-08-04 ENCOUNTER — OFFICE VISIT (OUTPATIENT)
Dept: FAMILY MEDICINE CLINIC | Facility: CLINIC | Age: 60
End: 2020-08-04
Payer: COMMERCIAL

## 2020-08-04 VITALS
DIASTOLIC BLOOD PRESSURE: 80 MMHG | RESPIRATION RATE: 18 BRPM | TEMPERATURE: 97.9 F | SYSTOLIC BLOOD PRESSURE: 130 MMHG | HEIGHT: 67 IN | BODY MASS INDEX: 42.06 KG/M2 | OXYGEN SATURATION: 97 % | HEART RATE: 89 BPM | WEIGHT: 268 LBS

## 2020-08-04 DIAGNOSIS — E78.5 DYSLIPIDEMIA: ICD-10-CM

## 2020-08-04 DIAGNOSIS — Z11.1 SCREENING FOR TUBERCULOSIS: ICD-10-CM

## 2020-08-04 DIAGNOSIS — K76.9 HEPATIC LESION: ICD-10-CM

## 2020-08-04 DIAGNOSIS — E11.9 TYPE 2 DIABETES MELLITUS WITHOUT COMPLICATION, WITHOUT LONG-TERM CURRENT USE OF INSULIN (HCC): Primary | ICD-10-CM

## 2020-08-04 DIAGNOSIS — I10 BENIGN HYPERTENSION: ICD-10-CM

## 2020-08-04 PROBLEM — R55 VASOVAGAL SYNCOPE: Status: RESOLVED | Noted: 2020-06-08 | Resolved: 2020-08-04

## 2020-08-04 PROCEDURE — 99214 OFFICE O/P EST MOD 30 MIN: CPT | Performed by: FAMILY MEDICINE

## 2020-08-04 PROCEDURE — 3079F DIAST BP 80-89 MM HG: CPT | Performed by: FAMILY MEDICINE

## 2020-08-04 PROCEDURE — 3008F BODY MASS INDEX DOCD: CPT | Performed by: FAMILY MEDICINE

## 2020-08-04 PROCEDURE — 3060F POS MICROALBUMINURIA REV: CPT | Performed by: FAMILY MEDICINE

## 2020-08-04 PROCEDURE — 3044F HG A1C LEVEL LT 7.0%: CPT | Performed by: FAMILY MEDICINE

## 2020-08-04 PROCEDURE — 3075F SYST BP GE 130 - 139MM HG: CPT | Performed by: FAMILY MEDICINE

## 2020-08-04 PROCEDURE — 86580 TB INTRADERMAL TEST: CPT

## 2020-08-04 PROCEDURE — 1036F TOBACCO NON-USER: CPT | Performed by: FAMILY MEDICINE

## 2020-08-04 NOTE — ASSESSMENT & PLAN NOTE
Improved levels  Will get [employer] info for glucometer  Lab Results   Component Value Date    HGBA1C 6 9 (A) 05/06/2020

## 2020-08-04 NOTE — PROGRESS NOTES
Assessment/Plan:    1  Type 2 diabetes mellitus without complication, without long-term current use of insulin (HCC)  Assessment & Plan:  Improved levels  Will get [employer] info for glucometer  Lab Results   Component Value Date    HGBA1C 6 9 (A) 05/06/2020       Orders:  -     Diabetic foot exam; Future    2  Benign hypertension  Assessment & Plan:  Stable on current lisinopril      3  Dyslipidemia  Assessment & Plan:  Stable on lipitor      4  Hepatic lesion  Assessment & Plan:  Pathology show neuroendocrine tumor  Will get set up with heme      5  Screening for tuberculosis  -     TB Skin Test          There are no Patient Instructions on file for this visit  No follow-ups on file  Subjective:      Patient ID: Robert Evans is a 56 y o  female  Chief Complaint   Patient presents with    Follow-up     Patient here for follow up on Type II Diabetes        Here for follow up  Discussed results with pt regarding her biopsy results      The following portions of the patient's history were reviewed and updated as appropriate: allergies, current medications, past family history, past medical history, past social history, past surgical history and problem list     Review of Systems   Constitutional: Negative  HENT: Negative  Eyes: Negative  Respiratory: Negative  Cardiovascular: Negative  Gastrointestinal: Positive for abdominal pain (intermittently)  Endocrine: Negative  Genitourinary: Negative  Musculoskeletal: Negative  Skin: Negative  Allergic/Immunologic: Negative  Neurological: Negative  Hematological: Negative  Psychiatric/Behavioral: Negative            Current Outpatient Medications   Medication Sig Dispense Refill    albuterol (VENTOLIN HFA) 90 mcg/act inhaler Inhale 2 puffs every 6 (six) hours as needed for wheezing 1 Inhaler 1    atorvastatin (LIPITOR) 40 mg tablet Take 1 tablet (40 mg total) by mouth daily 30 tablet 5    lisinopril-hydrochlorothiazide (PRINZIDE,ZESTORETIC) 20-25 MG per tablet Take 1 tablet by mouth daily 30 tablet 5    metFORMIN (GLUCOPHAGE) 500 mg tablet Take 2 tablets (1,000 mg total) by mouth 2 (two) times a day with meals 120 tablet 5    omeprazole (PriLOSEC) 40 MG capsule Take 1 capsule (40 mg total) by mouth daily before breakfast (Patient not taking: Reported on 8/4/2020) 30 capsule 5     No current facility-administered medications for this visit  Objective:    /80   Pulse 89   Temp 97 9 °F (36 6 °C)   Resp 18   Ht 5' 7"   Wt 122 kg (268 lb)   SpO2 97%   BMI 41 97 kg/m²        Physical Exam   HENT:   Head: Normocephalic and atraumatic  Eyes: Pupils are equal, round, and reactive to light  Neck: Normal range of motion  Neck supple  Cardiovascular: Normal rate and regular rhythm  Pulses are no weak pulses  Pulses:       Dorsalis pedis pulses are 2+ on the right side and 2+ on the left side  Posterior tibial pulses are 2+ on the right side and 2+ on the left side  Pulmonary/Chest: Effort normal and breath sounds normal    Abdominal: Normal appearance  Musculoskeletal: Normal range of motion  Feet:    Feet:   Right Foot:   Skin Integrity: Positive for dry skin  Negative for ulcer, skin breakdown, erythema, warmth or callus  Left Foot:   Skin Integrity: Positive for dry skin  Negative for ulcer, skin breakdown, erythema, warmth or callus  Neurological: She is alert  Skin: Skin is warm and dry  Capillary refill takes less than 2 seconds  Nursing note and vitals reviewed  Patient's shoes and socks removed  Right Foot/Ankle   Right Foot Inspection  Skin Exam: skin normal, skin intact and dry skin no warmth, no callus, no erythema, no maceration, no abnormal color, no pre-ulcer, no ulcer and no callus                          Toe Exam: ROM and strength within normal limits  Sensory     Proprioception: intact   Monofilament testing: intact  Vascular  Capillary refills: < 3 seconds  The right DP pulse is 2+  The right PT pulse is 2+  Left Foot/Ankle  Left Foot Inspection  Skin Exam: skin normal, skin intact and dry skinno warmth, no erythema, no maceration, normal color, no pre-ulcer, no ulcer and no callus                         Toe Exam: ROM and strength within normal limits                   Sensory     Proprioception: intact  Monofilament: diminished  Vascular  Capillary refills: < 3 seconds  The left DP pulse is 2+  The left PT pulse is 2+  Assign Risk Category:  No deformity present; No loss of protective sensation;  No weak pulses       Risk: 0        Moni Alvarenga, DO

## 2020-08-06 ENCOUNTER — APPOINTMENT (OUTPATIENT)
Dept: LAB | Facility: CLINIC | Age: 60
End: 2020-08-06
Payer: COMMERCIAL

## 2020-08-06 ENCOUNTER — CLINICAL SUPPORT (OUTPATIENT)
Dept: FAMILY MEDICINE CLINIC | Facility: CLINIC | Age: 60
End: 2020-08-06

## 2020-08-06 ENCOUNTER — PATIENT OUTREACH (OUTPATIENT)
Dept: HEMATOLOGY ONCOLOGY | Facility: CLINIC | Age: 60
End: 2020-08-06

## 2020-08-06 DIAGNOSIS — K76.9 HEPATIC LESION: ICD-10-CM

## 2020-08-06 DIAGNOSIS — Z11.1 SCREENING FOR TUBERCULOSIS: Primary | ICD-10-CM

## 2020-08-06 LAB
INDURATION: 0 MM
TB SKIN TEST: NEGATIVE

## 2020-08-06 PROCEDURE — RECHECK

## 2020-08-06 PROCEDURE — 36415 COLL VENOUS BLD VENIPUNCTURE: CPT

## 2020-08-06 PROCEDURE — 83519 RIA NONANTIBODY: CPT

## 2020-08-06 PROCEDURE — 82941 ASSAY OF GASTRIN: CPT

## 2020-08-06 PROCEDURE — 86316 IMMUNOASSAY TUMOR OTHER: CPT

## 2020-08-06 PROCEDURE — 84586 ASSAY OF VIP: CPT

## 2020-08-06 NOTE — PROGRESS NOTES
Assessment/Plan:    No problem-specific Assessment & Plan notes found for this encounter  There are no diagnoses linked to this encounter  Subjective:      Patient ID: Navya Smith is a 56 y o  female  HPI Patient here for PPD read         Review of Systems      Objective: There were no vitals taken for this visit           Physical Exam

## 2020-08-06 NOTE — PROGRESS NOTES
Call to Luisa Augustin to offer her an appointment with Dr Finn Junior on 8/20/20 at [facility], she was agreeable, she asked for information on neuroendocrine tumors, will email information to her, she was appreciative

## 2020-08-08 LAB — GASTRIN SERPL-MCNC: 16 PG/ML (ref 0–115)

## 2020-08-10 ENCOUNTER — TRANSCRIBE ORDERS (OUTPATIENT)
Dept: LAB | Facility: CLINIC | Age: 60
End: 2020-08-10

## 2020-08-10 ENCOUNTER — APPOINTMENT (OUTPATIENT)
Dept: LAB | Facility: CLINIC | Age: 60
End: 2020-08-10
Payer: COMMERCIAL

## 2020-08-10 DIAGNOSIS — K76.9 HEPATIC LESION: ICD-10-CM

## 2020-08-10 LAB — CGA SERPL-MCNC: 446.2 NG/ML (ref 0–101.8)

## 2020-08-10 PROCEDURE — 83497 ASSAY OF 5-HIAA: CPT

## 2020-08-12 LAB — VIP SERPL-MCNC: 27.6 PG/ML (ref 0–58.8)

## 2020-08-13 LAB
5OH-INDOLEACETATE 24H UR-MCNC: 11.4 MG/L
5OH-INDOLEACETATE 24H UR-MRATE: 20 MG/24 HR (ref 0–14.9)

## 2020-08-17 LAB — MISCELLANEOUS LAB TEST RESULT: NORMAL

## 2020-08-19 ENCOUNTER — TELEPHONE (OUTPATIENT)
Dept: HEMATOLOGY ONCOLOGY | Facility: CLINIC | Age: 60
End: 2020-08-19

## 2020-08-20 ENCOUNTER — CONSULT (OUTPATIENT)
Dept: HEMATOLOGY ONCOLOGY | Facility: CLINIC | Age: 60
End: 2020-08-20
Payer: COMMERCIAL

## 2020-08-20 VITALS
HEART RATE: 97 BPM | BODY MASS INDEX: 42.06 KG/M2 | OXYGEN SATURATION: 97 % | SYSTOLIC BLOOD PRESSURE: 126 MMHG | TEMPERATURE: 98.4 F | DIASTOLIC BLOOD PRESSURE: 68 MMHG | RESPIRATION RATE: 18 BRPM | HEIGHT: 67 IN | WEIGHT: 268 LBS

## 2020-08-20 DIAGNOSIS — I10 BENIGN HYPERTENSION: ICD-10-CM

## 2020-08-20 DIAGNOSIS — C7A.8 MESENTERY METASTASIS OF NEUROENDOCRINE TUMOR (HCC): ICD-10-CM

## 2020-08-20 DIAGNOSIS — E78.5 DYSLIPIDEMIA: ICD-10-CM

## 2020-08-20 DIAGNOSIS — C7B.8 MESENTERY METASTASIS OF NEUROENDOCRINE TUMOR (HCC): ICD-10-CM

## 2020-08-20 DIAGNOSIS — D3A.8 NEUROENDOCRINE TUMOR OF LIVER: Primary | ICD-10-CM

## 2020-08-20 DIAGNOSIS — E11.9 TYPE 2 DIABETES MELLITUS WITHOUT COMPLICATION, WITHOUT LONG-TERM CURRENT USE OF INSULIN (HCC): ICD-10-CM

## 2020-08-20 DIAGNOSIS — C7A.8 MALIGNANT NEUROENDOCRINE TUMOR OF LYMPH NODE (HCC): ICD-10-CM

## 2020-08-20 PROCEDURE — 3074F SYST BP LT 130 MM HG: CPT | Performed by: INTERNAL MEDICINE

## 2020-08-20 PROCEDURE — 99204 OFFICE O/P NEW MOD 45 MIN: CPT | Performed by: INTERNAL MEDICINE

## 2020-08-20 PROCEDURE — 3008F BODY MASS INDEX DOCD: CPT | Performed by: INTERNAL MEDICINE

## 2020-08-20 PROCEDURE — 3078F DIAST BP <80 MM HG: CPT | Performed by: INTERNAL MEDICINE

## 2020-08-20 PROCEDURE — 1036F TOBACCO NON-USER: CPT | Performed by: INTERNAL MEDICINE

## 2020-08-20 PROCEDURE — 3044F HG A1C LEVEL LT 7.0%: CPT | Performed by: INTERNAL MEDICINE

## 2020-08-20 NOTE — PROGRESS NOTES
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  HPI: Martha Mckoy is a 56y o  year old female   On a hot summer day on 06/04/2020 she got dehydrated and passed out and was taken to the emergency room where workup showed metastatic lesions in the liver on CT scan  She had biopsy of liver on 07/01/20 and that came back negative for malignancy  She had EUS and biopsy of liver and lymph nodes and both came back positive with well-differentiated grade 1 neuroendocrine tumor  She had PET net scan that showed metastatic lesions in the lymph nodes and liver and mesentery  That also showed some activity in the right maxillary tooth and patient states that tooth fell off 3 days prior to having the scan  She is not having any problem there anymore  Patient is not symptomatic from neuroendocrine malignancy  Tumor markers were checked and chromogranin A, 5 HIAA and pancreatic polypeptide results came back high  Normal blood counts ,chemistry and alpha fetoprotein except potassium was 3 3 and alkaline phosphatase 117  She states colonoscopy was 3 years ago         ROS:  08/20/20 Reviewed 13 systems: See symptoms in HPI  Presently no headaches, seizures, dizziness, diplopia, dysphagia, hoarseness, chest pain, palpitations, shortness of breath, cough, hemoptysis, abdominal pain, nausea, vomiting, change in bowel habits, melena, hematuria, fever, chills, bleeding, bone pains, skin rash, weight loss, arthritic symptoms,  tiredness , weakness, numbness, claudication and gait problem  No frequent infections  Not unusually sensitive to heat or cold  No swelling of the ankles  No swollen glands  Patient is anxious      No GYN symptoms      Historical Information   Past Medical History: Diagnosis Date    Bronchitis 7/3/2019    Diabetes mellitus [facility] )     Hyperlipidemia     Hypertension     Vasovagal syncope 6/8/2020     Past Surgical History:   Procedure Laterality Date    APPENDECTOMY      age 9     COLONOSCOPY      IR IMAGE GUIDED BIOPSY/ASPIRATION LIVER  7/1/2020    TONSILLECTOMY      age 7      Social History   Social History     Substance and Sexual Activity   Alcohol Use Never    Frequency: Never     Social History     Substance and Sexual Activity   Drug Use Never     Social History     Tobacco Use   Smoking Status Never Smoker   Smokeless Tobacco Never Used     Family History:   Family History   Problem Relation Age of Onset    Diabetes Father          Current Outpatient Medications:     atorvastatin (LIPITOR) 40 mg tablet, Take 1 tablet (40 mg total) by mouth daily, Disp: 30 tablet, Rfl: 5    lisinopril-hydrochlorothiazide (PRINZIDE,ZESTORETIC) 20-25 MG per tablet, Take 1 tablet by mouth daily, Disp: 30 tablet, Rfl: 5    metFORMIN (GLUCOPHAGE) 500 mg tablet, Take 2 tablets (1,000 mg total) by mouth 2 (two) times a day with meals, Disp: 120 tablet, Rfl: 5    albuterol (VENTOLIN HFA) 90 mcg/act inhaler, Inhale 2 puffs every 6 (six) hours as needed for wheezing (Patient not taking: Reported on 8/20/2020), Disp: 1 Inhaler, Rfl: 1    omeprazole (PriLOSEC) 40 MG capsule, Take 1 capsule (40 mg total) by mouth daily before breakfast (Patient not taking: Reported on 8/4/2020), Disp: 30 capsule, Rfl: 5    No Known Allergies  @ ROS@  Physical Exam:  Vitals:    08/20/20 1043   BP: 126/68   BP Location: Right arm   Patient Position: Sitting   Cuff Size: Adult   Pulse: 97   Resp: 18   Temp: 98 4 °F (36 9 °C)   SpO2: 97%   Weight: 122 kg (268 lb)   Height: 5' 7" (1 702 m)     Alert, oriented, not in distress, no icterus, no oral thrush, no palpable neck mass, clear lung fields, regular heart rate, no murmur, abdomen  soft and non tender, no palpable abdominal mass, no ascites, no edema of ankles, no calf tenderness, no focal neurological deficit, no skin rash, no palpable lymphadenopathy in the neck and axillary areas, good arterial pulses, no clubbing  Patient is anxious  Performance status 0  Lab Results: I have reviewed all pertinent labs  LABS:  Results for orders placed or performed in visit on 08/10/20   5 HIAA, urine, quantitative, 24 hour   Result Value Ref Range    5-HIAA, 24H Ur 20 0 (H) 0 0 - 14 9 mg/24 hr    5-HIAA, Urine 11 4 Undefined mg/L     Results   Fine Needle Aspiration (Order [id number])   Result Information     Status  Priority  Source    Final result (7/31/2020  5:34 PM)  Routine  Lymph Node    Fine Needle Aspiration: Result Notes       Vashti Hernandez MD   8/6/2020  2:53 PM       Called patient to notify her of results  She is aware she has metastatic neuroendocrine tumor which has spread to her lymph node and liver   She has oncology appointment next week            Fine Needle Aspiration: [id number]   Order: [id number]   Collected:  7/28/2020 12:43 PM   Status:  Final result   Visible to patient:  No (inaccessible in MyChart)   Dx:  Hepatic lesion   Component     Case Report    Non-gynecologic Cytology                          Case: [id number]                                    Authorizing Provider: Smith Land MD         Collected:           07/28/2020 1243                Ordering Location:     [facility]      Received:            07/28/2020 [address] [place] [address] Endoscopy                                                             Pathologist:           Jarrell Benavidez MD                                                                    Specimens:   A) - Lymph Node, portacaval                                                                           B) - Lymph Node, portacaval                                                                           C) - Lymph Node, portacaval, cell block                                                               D) - Liver                                                                                            E) - Liver                                                                                            F) - Liver, cell block                                                                      Final Diagnosis    A -B -C  Lymph Node, Portacaval, Biopsy (Thin-prep, smears and cell block preparations):  Positive for malignancy  Metastatic well-differentiated neuroendocrine tumor, Grade 1  Background lymphocytes present      Satisfactory for evaluation      D -E -F   Liver, Biopsy (Thin-prep, smears and cell block preparations):  Positive for malignancy  Metastatic well-differentiated neuroendocrine tumor, Grade 1  Background hepatocytes present      Satisfactory for evaluation      Comment; Immunochemical stains performed with appropriate controls show the tumor cells are positive for CK-AE1/3, synaptophysin, chromogranin and CDX-2  They are negative for TF-1, [place]-8 and CD56  Ki67 proliferation index is less than 1%  CD3 highlights T lymphocytes  CD20 highlights B lymphocytes  The morphologic and immunophenotypic features are consistent with metastatic well-differentiated neuroendocrine tumor, Grade 1, favor gastrointestinal primary  Clinical correlation recommended  Electronically signed by Megha Pelaez MD on 7/31/2020 at  5:34 PM    Note                 Imaging Studies: I have personally reviewed pertinent reports  IMPRESSION:  1  Multiple hepatic lesions again identified, largely without significant FDG uptake except for a few lesions as noted above  Malignancy should be excluded  2  The mesenteric nodule with coarse calcification also does not demonstrate focal radiotracer uptake  No significant radiotracer uptake in enlarged portacaval lymph nodes    3  Well-differentiated carcinoid tumors, which would be in the differential given the imaging findings, may not demonstrate significant FDG uptake  Correlate clinically  Consider gallium-68 Dotatate PET/CT for further evaluation as warranted  4   Focal radiotracer uptake at the right maxilla inferiorly, appears to be related to the dentition  There is a corresponding dental henri of the 2nd molar here on CT  Correlate with dental exam for infection/inflammation         Workstation performed: [id number]      Imaging     NM PET CT skull base to mid thigh (Order: [id number]) - 7/13/2020   IMPRESSION:     Multiple hyperenhancing lesions within the liver parenchyma which is a demonstrate the diffusion restriction, pseudocapsule  These are indeterminate but suspicious for hypervascular metastatic disease  Consider biochemical evaluation to exclude   neuroendocrine neoplasia     2 1 x 2 1 cm diffusion restricting focal lesion in the portacaval region may represent a metastatic lymph node, alternatively an exophytic lesion from the pancreatic head is also a possibility  This demonstrates imaging features similar to the hepatic   metastatic lesion     No renal mass              Workstation performed: [id number]      Imaging     MRI abdomen w wo contrast (Order: [id number]) - 6/26/2020     Pathology, and Other Studies: I have personally reviewed pertinent reports  Assessment and Plan:   Well-differentiated grade 1 metastatic neuroendocrine tumor to liver and lymph nodes and mesentery     Patient is asymptomatic from this condition-nonfunctional tumor  We discussed patient characteristics  We discussed cancer characteristics  We discussed lanreotide versus Sandostatin and patient will take information on both the drugs and discuss with specialist at [facility]  She had tele communication/consultation with specialist at [facility]  For future use we also discussed availability of PRRT and liver directed therapy  All discussed in detail  Questions answered    Patient will provide me with the information on specialist at [facility] so that I could communicate with the specialist   In the meantime I will be sending copy of this letter to the patient  Also discussed the importance of self-breast examination, eating healthy foods, staying active and health screening test   Patient is capable of self-care  Discussed precautions against coronavirus  1  Neuroendocrine tumor of liver      2  [facility]align[facility]      3  Mesentery metastasis of neuroendocrine tumor ([facility] )      4  Type 2 diabetes mellitus without complication, without long-term current use of insulin ([facility] )      5  Dyslipidemia      6  Benign hypertension          Patient voiced understanding and agreement in the discussion  Counseling / Coordination of Care    Greater than 50% of total time was spent with the patient and / or family counseling and / or coordination of care

## 2020-08-25 ENCOUNTER — DOCUMENTATION (OUTPATIENT)
Dept: INFUSION CENTER | Facility: HOSPITAL | Age: 60
End: 2020-08-25

## 2020-08-25 NOTE — SOCIAL WORK
MSW received a Distress Thermometer from Med Onc, where the pt self scored a 2 to indicate her level of stress  The pt did not sandra off any psychosocial or physical problem areas  Due to the pt's score, no further MSW intervention is warranted at this time  If the pt requests or is referred, a Cancer Counselor will be available to her while she is receiving her oncology care

## 2020-09-22 ENCOUNTER — TELEPHONE (OUTPATIENT)
Dept: HEMATOLOGY ONCOLOGY | Facility: CLINIC | Age: 60
End: 2020-09-22

## 2020-09-22 NOTE — TELEPHONE ENCOUNTER
Appointment Cancellation     Person calling in Patient    Provider Dr Zackary Andino or Dr Chase Benavidez   Office Visit Date and Time  09/24 at 3:00pm   Office Visit Location [facility]   Did patient reschedule their appointment? No   Is this patient a treatment patient? No   When is their next infusion visit? N/A   Reason for Cancellation Going to [facility]      If the patient is a treatment patient, please route this to the office nurse

## 2020-11-12 ENCOUNTER — OFFICE VISIT (OUTPATIENT)
Dept: FAMILY MEDICINE CLINIC | Facility: CLINIC | Age: 60
End: 2020-11-12
Payer: COMMERCIAL

## 2020-11-12 VITALS
TEMPERATURE: 97.6 F | HEIGHT: 67 IN | SYSTOLIC BLOOD PRESSURE: 120 MMHG | HEART RATE: 82 BPM | OXYGEN SATURATION: 99 % | WEIGHT: 270.6 LBS | BODY MASS INDEX: 42.47 KG/M2 | RESPIRATION RATE: 16 BRPM | DIASTOLIC BLOOD PRESSURE: 60 MMHG

## 2020-11-12 DIAGNOSIS — Z00.00 ANNUAL PHYSICAL EXAM: Primary | ICD-10-CM

## 2020-11-12 DIAGNOSIS — E11.9 TYPE 2 DIABETES MELLITUS WITHOUT COMPLICATION, WITHOUT LONG-TERM CURRENT USE OF INSULIN (HCC): ICD-10-CM

## 2020-11-12 DIAGNOSIS — I10 BENIGN HYPERTENSION: ICD-10-CM

## 2020-11-12 DIAGNOSIS — C7B.8 MESENTERY METASTASIS OF NEUROENDOCRINE TUMOR (HCC): ICD-10-CM

## 2020-11-12 DIAGNOSIS — C7A.8 MESENTERY METASTASIS OF NEUROENDOCRINE TUMOR (HCC): ICD-10-CM

## 2020-11-12 DIAGNOSIS — Z12.31 ENCOUNTER FOR SCREENING MAMMOGRAM FOR MALIGNANT NEOPLASM OF BREAST: ICD-10-CM

## 2020-11-12 DIAGNOSIS — H91.93 DECREASED HEARING OF BOTH EARS: ICD-10-CM

## 2020-11-12 DIAGNOSIS — E78.5 DYSLIPIDEMIA: ICD-10-CM

## 2020-11-12 PROCEDURE — 3078F DIAST BP <80 MM HG: CPT | Performed by: FAMILY MEDICINE

## 2020-11-12 PROCEDURE — 99396 PREV VISIT EST AGE 40-64: CPT | Performed by: FAMILY MEDICINE

## 2020-11-12 PROCEDURE — 3074F SYST BP LT 130 MM HG: CPT | Performed by: FAMILY MEDICINE

## 2020-11-12 PROCEDURE — 3008F BODY MASS INDEX DOCD: CPT | Performed by: FAMILY MEDICINE

## 2020-11-15 DIAGNOSIS — I10 ESSENTIAL HYPERTENSION: ICD-10-CM

## 2020-11-15 DIAGNOSIS — E78.5 DYSLIPIDEMIA: ICD-10-CM

## 2020-11-15 DIAGNOSIS — E11.9 CONTROLLED TYPE 2 DIABETES MELLITUS WITHOUT COMPLICATION, WITHOUT LONG-TERM CURRENT USE OF INSULIN (HCC): ICD-10-CM

## 2020-11-16 RX ORDER — ATORVASTATIN CALCIUM 40 MG/1
TABLET, FILM COATED ORAL
Qty: 30 TABLET | Refills: 5 | Status: SHIPPED | OUTPATIENT
Start: 2020-11-16 | End: 2021-06-02

## 2020-11-16 RX ORDER — LISINOPRIL AND HYDROCHLOROTHIAZIDE 25; 20 MG/1; MG/1
TABLET ORAL
Qty: 30 TABLET | Refills: 5 | Status: SHIPPED | OUTPATIENT
Start: 2020-11-16 | End: 2021-06-02

## 2021-03-16 ENCOUNTER — OFFICE VISIT (OUTPATIENT)
Dept: FAMILY MEDICINE CLINIC | Facility: CLINIC | Age: 61
End: 2021-03-16
Payer: COMMERCIAL

## 2021-03-16 VITALS
RESPIRATION RATE: 18 BRPM | SYSTOLIC BLOOD PRESSURE: 120 MMHG | BODY MASS INDEX: 40.25 KG/M2 | TEMPERATURE: 99.2 F | OXYGEN SATURATION: 98 % | WEIGHT: 265.6 LBS | DIASTOLIC BLOOD PRESSURE: 74 MMHG | HEIGHT: 68 IN | HEART RATE: 82 BPM

## 2021-03-16 DIAGNOSIS — D3A.8 NEUROENDOCRINE TUMOR OF LIVER: ICD-10-CM

## 2021-03-16 DIAGNOSIS — E78.5 DYSLIPIDEMIA: ICD-10-CM

## 2021-03-16 DIAGNOSIS — C7B.8 MESENTERY METASTASIS OF NEUROENDOCRINE TUMOR (HCC): ICD-10-CM

## 2021-03-16 DIAGNOSIS — C7A.8 MESENTERY METASTASIS OF NEUROENDOCRINE TUMOR (HCC): ICD-10-CM

## 2021-03-16 DIAGNOSIS — E11.9 TYPE 2 DIABETES MELLITUS WITHOUT COMPLICATION, WITHOUT LONG-TERM CURRENT USE OF INSULIN (HCC): ICD-10-CM

## 2021-03-16 DIAGNOSIS — E11.9 CONTROLLED TYPE 2 DIABETES MELLITUS WITHOUT COMPLICATION, WITHOUT LONG-TERM CURRENT USE OF INSULIN (HCC): Primary | ICD-10-CM

## 2021-03-16 DIAGNOSIS — I10 BENIGN HYPERTENSION: ICD-10-CM

## 2021-03-16 LAB — SL AMB POCT HEMOGLOBIN AIC: 8.1 (ref ?–6.5)

## 2021-03-16 PROCEDURE — 83036 HEMOGLOBIN GLYCOSYLATED A1C: CPT | Performed by: FAMILY MEDICINE

## 2021-03-16 PROCEDURE — 1036F TOBACCO NON-USER: CPT | Performed by: FAMILY MEDICINE

## 2021-03-16 PROCEDURE — 3078F DIAST BP <80 MM HG: CPT | Performed by: FAMILY MEDICINE

## 2021-03-16 PROCEDURE — 3074F SYST BP LT 130 MM HG: CPT | Performed by: FAMILY MEDICINE

## 2021-03-16 PROCEDURE — 3725F SCREEN DEPRESSION PERFORMED: CPT | Performed by: FAMILY MEDICINE

## 2021-03-16 PROCEDURE — 99214 OFFICE O/P EST MOD 30 MIN: CPT | Performed by: FAMILY MEDICINE

## 2021-03-16 PROCEDURE — 3008F BODY MASS INDEX DOCD: CPT | Performed by: FAMILY MEDICINE

## 2021-03-16 NOTE — PROGRESS NOTES
Assessment/Plan:    1  Controlled type 2 diabetes mellitus without complication, without long-term current use of insulin (HCC)  -     POCT hemoglobin A1c  -     Empagliflozin 25 MG TABS; Take 1 tablet (25 mg total) by mouth every morning  -     Comprehensive metabolic panel; Future; Expected date: 03/16/2021  -     Microalbumin / creatinine urine ratio; Future; Expected date: 03/16/2021  -     TSH, 3rd generation with Free T4 reflex; Future; Expected date: 03/16/2021  -     Hemoglobin A1C; Future; Expected date: 07/01/2021    2  Type 2 diabetes mellitus without complication, without long-term current use of insulin (HCC)  Assessment & Plan:  Add jardiance  Discussed diet  Lab Results   Component Value Date    HGBA1C 8 1 (A) 03/16/2021         3  Neuroendocrine tumor of liver  Assessment & Plan:  Seeing [facility]      4  Benign hypertension  Assessment & Plan:  Stable on lisinopril      5  Mesentery metastasis of neuroendocrine tumor [facility])  Assessment & Plan:  Seeing [facility]      6  Dyslipidemia  Assessment & Plan:  Check lipids    Orders:  -     Lipid Panel with Direct LDL reflex; Future; Expected date: 03/16/2021  -     TSH, 3rd generation with Free T4 reflex; Future; Expected date: 03/16/2021    7  BMI 40 0-44 9, adult [facility])  Assessment & Plan:  Diet and exercise discussed      BMI Counseling: Body mass index is 40 77 kg/m²  The BMI is above normal  Nutrition recommendations include encouraging healthy choices of fruits and vegetables  Exercise recommendations include exercising 3-5 times per week  No pharmacotherapy was ordered  There are no Patient Instructions on file for this visit  No follow-ups on file  Subjective:      Patient ID: Sourav Sales is a 56 y o  female      Chief Complaint   Patient presents with    Follow-up     Patient here for follow up on Type II Diabetes        Here for follow up  Seeing oncologist for hormone injection monthly-had 3rd, getting scan April 14th, numbers are improving  Gets diarrhea from the injection  Taking 2 metformin twice daily  Diet has been poor    Diabetes  She presents for her follow-up diabetic visit  She has type 2 diabetes mellitus  Her disease course has been worsening  There are no hypoglycemic associated symptoms  Pertinent negatives for diabetes include no blurred vision, no fatigue, no polydipsia, no polyphagia, no polyuria and no visual change  There are no hypoglycemic complications  Symptoms are worsening  There are no diabetic complications  Risk factors for coronary artery disease include dyslipidemia  Current diabetic treatment includes oral agent (monotherapy)  She is compliant with treatment all of the time  Her home blood glucose trend is increasing rapidly  The following portions of the patient's history were reviewed and updated as appropriate: allergies, current medications, past family history, past medical history, past social history, past surgical history and problem list     Review of Systems   Constitutional: Negative for fatigue  HENT: Negative  Eyes: Negative  Negative for blurred vision  Respiratory: Negative  Cardiovascular: Negative  Gastrointestinal: Negative  Endocrine: Negative for polydipsia, polyphagia and polyuria  Musculoskeletal: Negative  Skin: Negative  Allergic/Immunologic: Negative  Hematological: Negative  Psychiatric/Behavioral: Negative            Current Outpatient Medications   Medication Sig Dispense Refill    atorvastatin (LIPITOR) 40 mg tablet TAKE 1 TABLET BY MOUTH EVERY DAY 30 tablet 5    lisinopril-hydrochlorothiazide (PRINZIDE,ZESTORETIC) 20-25 MG per tablet TAKE 1 TABLET BY MOUTH EVERY DAY 30 tablet 5    metFORMIN (GLUCOPHAGE) 500 mg tablet TAKE 2 TABLETS BY MOUTH TWICE A DAY WITH FOOD 120 tablet 5    Multiple Vitamins-Minerals (ONE-A-DAY 50 PLUS PO) Take by mouth      albuterol (VENTOLIN HFA) 90 mcg/act inhaler Inhale 2 puffs every 6 (six) hours as needed for wheezing (Patient not taking: Reported on 8/20/2020) 1 Inhaler 1    Empagliflozin 25 MG TABS Take 1 tablet (25 mg total) by mouth every morning 30 tablet 5     No current facility-administered medications for this visit  Objective:    /74   Pulse 82   Temp 99 2 °F (37 3 °C)   Resp 18   Ht 5' 7 68" (1 719 m)   Wt 120 kg (265 lb 9 6 oz)   SpO2 98%   BMI 40 77 kg/m²        Physical Exam  Vitals signs and nursing note reviewed  Constitutional:       Appearance: Normal appearance  HENT:      Head: Normocephalic and atraumatic  Eyes:      Extraocular Movements: Extraocular movements intact  Pupils: Pupils are equal, round, and reactive to light  Neck:      Musculoskeletal: Normal range of motion and neck supple  Cardiovascular:      Rate and Rhythm: Normal rate and regular rhythm  Pulses: Normal pulses  Heart sounds: Normal heart sounds  Pulmonary:      Effort: Pulmonary effort is normal       Breath sounds: Normal breath sounds  Abdominal:      General: Abdomen is flat  Palpations: Abdomen is soft  Musculoskeletal: Normal range of motion  Skin:     General: Skin is warm  Capillary Refill: Capillary refill takes less than 2 seconds  Neurological:      General: No focal deficit present  Mental Status: She is alert and oriented to person, place, and time  Psychiatric:         Mood and Affect: Mood normal          Behavior: Behavior normal          Thought Content:  Thought content normal          Judgment: Judgment normal                 Josr Christina DO

## 2021-03-27 ENCOUNTER — APPOINTMENT (OUTPATIENT)
Dept: LAB | Facility: CLINIC | Age: 61
End: 2021-03-27
Payer: COMMERCIAL

## 2021-03-27 DIAGNOSIS — E11.9 CONTROLLED TYPE 2 DIABETES MELLITUS WITHOUT COMPLICATION, WITHOUT LONG-TERM CURRENT USE OF INSULIN (HCC): ICD-10-CM

## 2021-03-27 DIAGNOSIS — E78.5 DYSLIPIDEMIA: ICD-10-CM

## 2021-03-27 LAB
ALBUMIN SERPL BCP-MCNC: 3.2 G/DL (ref 3.5–5)
ALP SERPL-CCNC: 104 U/L (ref 46–116)
ALT SERPL W P-5'-P-CCNC: 62 U/L (ref 12–78)
ANION GAP SERPL CALCULATED.3IONS-SCNC: 7 MMOL/L (ref 4–13)
AST SERPL W P-5'-P-CCNC: 33 U/L (ref 5–45)
BILIRUB SERPL-MCNC: 0.69 MG/DL (ref 0.2–1)
BUN SERPL-MCNC: 18 MG/DL (ref 5–25)
CALCIUM ALBUM COR SERPL-MCNC: 9.6 MG/DL (ref 8.3–10.1)
CALCIUM SERPL-MCNC: 9 MG/DL (ref 8.3–10.1)
CHLORIDE SERPL-SCNC: 105 MMOL/L (ref 100–108)
CHOLEST SERPL-MCNC: 116 MG/DL (ref 50–200)
CO2 SERPL-SCNC: 30 MMOL/L (ref 21–32)
CREAT SERPL-MCNC: 0.97 MG/DL (ref 0.6–1.3)
CREAT UR-MCNC: 256 MG/DL
EST. AVERAGE GLUCOSE BLD GHB EST-MCNC: 180 MG/DL
GFR SERPL CREATININE-BSD FRML MDRD: 63 ML/MIN/1.73SQ M
GLUCOSE P FAST SERPL-MCNC: 196 MG/DL (ref 65–99)
HBA1C MFR BLD: 7.9 %
HDLC SERPL-MCNC: 44 MG/DL
LDLC SERPL CALC-MCNC: 44 MG/DL (ref 0–100)
MICROALBUMIN UR-MCNC: 26.4 MG/L (ref 0–20)
MICROALBUMIN/CREAT 24H UR: 10 MG/G CREATININE (ref 0–30)
POTASSIUM SERPL-SCNC: 4.1 MMOL/L (ref 3.5–5.3)
PROT SERPL-MCNC: 7.1 G/DL (ref 6.4–8.2)
SODIUM SERPL-SCNC: 142 MMOL/L (ref 136–145)
TRIGL SERPL-MCNC: 141 MG/DL
TSH SERPL DL<=0.05 MIU/L-ACNC: 1.65 UIU/ML (ref 0.36–3.74)

## 2021-03-27 PROCEDURE — 82570 ASSAY OF URINE CREATININE: CPT

## 2021-03-27 PROCEDURE — 80061 LIPID PANEL: CPT

## 2021-03-27 PROCEDURE — 80053 COMPREHEN METABOLIC PANEL: CPT

## 2021-03-27 PROCEDURE — 82043 UR ALBUMIN QUANTITATIVE: CPT

## 2021-03-27 PROCEDURE — 3061F NEG MICROALBUMINURIA REV: CPT | Performed by: FAMILY MEDICINE

## 2021-03-27 PROCEDURE — 84443 ASSAY THYROID STIM HORMONE: CPT

## 2021-03-27 PROCEDURE — 3051F HG A1C>EQUAL 7.0%<8.0%: CPT | Performed by: FAMILY MEDICINE

## 2021-03-27 PROCEDURE — 36415 COLL VENOUS BLD VENIPUNCTURE: CPT

## 2021-03-27 PROCEDURE — 83036 HEMOGLOBIN GLYCOSYLATED A1C: CPT

## 2021-06-02 DIAGNOSIS — E78.5 DYSLIPIDEMIA: ICD-10-CM

## 2021-06-02 DIAGNOSIS — I10 ESSENTIAL HYPERTENSION: ICD-10-CM

## 2021-06-02 RX ORDER — LISINOPRIL AND HYDROCHLOROTHIAZIDE 25; 20 MG/1; MG/1
TABLET ORAL
Qty: 30 TABLET | Refills: 5 | Status: SHIPPED | OUTPATIENT
Start: 2021-06-02 | End: 2021-12-05

## 2021-06-02 RX ORDER — ATORVASTATIN CALCIUM 40 MG/1
TABLET, FILM COATED ORAL
Qty: 30 TABLET | Refills: 5 | Status: SHIPPED | OUTPATIENT
Start: 2021-06-02 | End: 2021-12-05

## 2021-07-19 ENCOUNTER — OFFICE VISIT (OUTPATIENT)
Dept: FAMILY MEDICINE CLINIC | Facility: CLINIC | Age: 61
End: 2021-07-19
Payer: COMMERCIAL

## 2021-07-19 VITALS
SYSTOLIC BLOOD PRESSURE: 120 MMHG | RESPIRATION RATE: 12 BRPM | HEIGHT: 68 IN | BODY MASS INDEX: 38.19 KG/M2 | HEART RATE: 67 BPM | TEMPERATURE: 98 F | DIASTOLIC BLOOD PRESSURE: 90 MMHG | WEIGHT: 252 LBS | OXYGEN SATURATION: 97 %

## 2021-07-19 DIAGNOSIS — E11.9 TYPE 2 DIABETES MELLITUS WITHOUT COMPLICATION, WITHOUT LONG-TERM CURRENT USE OF INSULIN (HCC): Primary | ICD-10-CM

## 2021-07-19 DIAGNOSIS — D3A.8 NEUROENDOCRINE TUMOR OF LIVER: ICD-10-CM

## 2021-07-19 DIAGNOSIS — E78.5 DYSLIPIDEMIA: ICD-10-CM

## 2021-07-19 PROCEDURE — 1036F TOBACCO NON-USER: CPT | Performed by: FAMILY MEDICINE

## 2021-07-19 PROCEDURE — 3074F SYST BP LT 130 MM HG: CPT | Performed by: FAMILY MEDICINE

## 2021-07-19 PROCEDURE — 99214 OFFICE O/P EST MOD 30 MIN: CPT | Performed by: FAMILY MEDICINE

## 2021-07-19 PROCEDURE — 3080F DIAST BP >= 90 MM HG: CPT | Performed by: FAMILY MEDICINE

## 2021-07-19 PROCEDURE — 3008F BODY MASS INDEX DOCD: CPT | Performed by: FAMILY MEDICINE

## 2021-07-19 NOTE — PROGRESS NOTES
Assessment/Plan:    1  Type 2 diabetes mellitus without complication, without long-term current use of insulin (HCC)  Assessment & Plan:  Check a1c  Needs eye exam  Lab Results   Component Value Date    HGBA1C 7 9 (H) 03/27/2021       Orders:  -     Hemoglobin A1C; Future; Expected date: 08/02/2021    2  Neuroendocrine tumor of liver  Assessment & Plan:  Seeing varela for therapy  Will get notes      3  Dyslipidemia  Assessment & Plan:  Stable on current meds            Patient Instructions   Physical 11/12 2021      Return in about 4 months (around 11/19/2021)  Subjective:      Patient ID: Navneet Hester is a 59 y o  female  Chief Complaint   Patient presents with    Follow-up     4 month       Here for follow up  Seeing Aman Vidales- Had Pet scan Friday, has televisit, continue with immunotherapy  Once every 3 month going into [place] for a different treatment  Has lost weight due to jardiance-feels ok on it    Hypertension  This is a chronic problem  The current episode started more than 1 year ago  The problem is controlled  There are no associated agents to hypertension  Risk factors for coronary artery disease include diabetes mellitus and dyslipidemia  Past treatments include ACE inhibitors  The current treatment provides moderate improvement  There are no compliance problems  Hyperlipidemia  This is a chronic problem  The current episode started more than 1 year ago  The problem is controlled  Recent lipid tests were reviewed and are normal  There are no known factors aggravating her hyperlipidemia  Current antihyperlipidemic treatment includes statins  The current treatment provides significant improvement of lipids  There are no compliance problems  Diabetes  She presents for her follow-up diabetic visit  She has type 2 diabetes mellitus  Her disease course has been stable  There are no hypoglycemic associated symptoms  Associated symptoms include weight loss (due to med)   There are no hypoglycemic complications  Symptoms are stable  There are no diabetic complications  Risk factors for coronary artery disease include diabetes mellitus, hypertension and dyslipidemia  Current diabetic treatment includes oral agent (dual therapy)  She is compliant with treatment all of the time  She has not had a previous visit with a dietitian  There is no change in her home blood glucose trend  An ACE inhibitor/angiotensin II receptor blocker is being taken  She does not see a podiatrist Eye exam is not current  The following portions of the patient's history were reviewed and updated as appropriate: allergies, current medications, past family history, past medical history, past social history, past surgical history and problem list     Review of Systems   Constitutional: Positive for weight loss (due to med)  Current Outpatient Medications   Medication Sig Dispense Refill    albuterol (VENTOLIN HFA) 90 mcg/act inhaler Inhale 2 puffs every 6 (six) hours as needed for wheezing (Patient not taking: Reported on 8/20/2020) 1 Inhaler 1    atorvastatin (LIPITOR) 40 mg tablet TAKE 1 TABLET BY MOUTH EVERY DAY 30 tablet 5    Empagliflozin 25 MG TABS Take 1 tablet (25 mg total) by mouth every morning 30 tablet 5    lisinopril-hydrochlorothiazide (PRINZIDE,ZESTORETIC) 20-25 MG per tablet TAKE 1 TABLET BY MOUTH EVERY DAY 30 tablet 5    metFORMIN (GLUCOPHAGE) 500 mg tablet TAKE 2 TABLETS BY MOUTH TWICE A DAY WITH FOOD 120 tablet 5    Multiple Vitamins-Minerals (ONE-A-DAY 50 PLUS PO) Take by mouth       No current facility-administered medications for this visit  Objective:    /90   Pulse 67   Temp 98 °F (36 7 °C) (Tympanic)   Resp 12   Ht 5' 7 68" (1 719 m)   Wt 114 kg (252 lb)   SpO2 97%   BMI 38 68 kg/m²        Physical Exam  Vitals and nursing note reviewed  Constitutional:       Appearance: Normal appearance  HENT:      Head: Normocephalic and atraumatic     Eyes:      Extraocular Movements: Extraocular movements intact  Pupils: Pupils are equal, round, and reactive to light  Cardiovascular:      Rate and Rhythm: Normal rate and regular rhythm  Pulses: Normal pulses  no weak pulses          Dorsalis pedis pulses are 2+ on the right side and 2+ on the left side  Posterior tibial pulses are 2+ on the right side and 2+ on the left side  Heart sounds: Normal heart sounds  Pulmonary:      Effort: Pulmonary effort is normal       Breath sounds: Normal breath sounds  Abdominal:      General: Abdomen is flat  Palpations: Abdomen is soft  Musculoskeletal:      Cervical back: Normal range of motion and neck supple  Feet:      Right foot:      Skin integrity: No ulcer, skin breakdown, erythema, warmth, callus or dry skin  Left foot:      Skin integrity: No ulcer, skin breakdown, erythema, warmth, callus or dry skin  Skin:     General: Skin is warm  Capillary Refill: Capillary refill takes less than 2 seconds  Neurological:      General: No focal deficit present  Mental Status: She is alert and oriented to person, place, and time  Psychiatric:         Mood and Affect: Mood normal          Behavior: Behavior normal          Thought Content: Thought content normal          Judgment: Judgment normal            Patient's shoes and socks removed  Right Foot/Ankle   Right Foot Inspection  Skin Exam: skin normal and skin intact no dry skin, no warmth, no callus, no erythema, no maceration, no abnormal color, no pre-ulcer, no ulcer and no callus                          Toe Exam: ROM and strength within normal limits  Sensory     Proprioception: intact   Monofilament testing: intact  Vascular  Capillary refills: < 3 seconds  The right DP pulse is 2+  The right PT pulse is 2+       Left Foot/Ankle  Left Foot Inspection  Skin Exam: skin normal and skin intactno dry skin, no warmth, no erythema, no maceration, normal color, no pre-ulcer, no ulcer and no callus Toe Exam: ROM and strength within normal limits                   Sensory     Proprioception: intact  Monofilament: intact  Vascular  Capillary refills: < 3 seconds  The left DP pulse is 2+  The left PT pulse is 2+  Assign Risk Category:  No deformity present; No loss of protective sensation;  No weak pulses       Risk: 0       Kaila Reardon DO

## 2021-07-31 DIAGNOSIS — E11.9 CONTROLLED TYPE 2 DIABETES MELLITUS WITHOUT COMPLICATION, WITHOUT LONG-TERM CURRENT USE OF INSULIN (HCC): ICD-10-CM

## 2021-09-03 DIAGNOSIS — E11.9 CONTROLLED TYPE 2 DIABETES MELLITUS WITHOUT COMPLICATION, WITHOUT LONG-TERM CURRENT USE OF INSULIN (HCC): ICD-10-CM

## 2021-09-03 RX ORDER — EMPAGLIFLOZIN 25 MG/1
TABLET, FILM COATED ORAL
Qty: 30 TABLET | Refills: 5 | Status: SHIPPED | OUTPATIENT
Start: 2021-09-03 | End: 2022-03-08

## 2021-11-29 ENCOUNTER — OFFICE VISIT (OUTPATIENT)
Dept: FAMILY MEDICINE CLINIC | Facility: CLINIC | Age: 61
End: 2021-11-29
Payer: COMMERCIAL

## 2021-11-29 VITALS
HEART RATE: 70 BPM | WEIGHT: 237.2 LBS | HEIGHT: 68 IN | BODY MASS INDEX: 35.95 KG/M2 | DIASTOLIC BLOOD PRESSURE: 70 MMHG | OXYGEN SATURATION: 98 % | SYSTOLIC BLOOD PRESSURE: 112 MMHG | TEMPERATURE: 98 F

## 2021-11-29 DIAGNOSIS — N64.4 BREAST PAIN IN FEMALE: ICD-10-CM

## 2021-11-29 DIAGNOSIS — E78.5 DYSLIPIDEMIA: ICD-10-CM

## 2021-11-29 DIAGNOSIS — D22.9 ATYPICAL MOLE: ICD-10-CM

## 2021-11-29 DIAGNOSIS — I10 BENIGN HYPERTENSION: ICD-10-CM

## 2021-11-29 DIAGNOSIS — E66.01 CLASS 2 SEVERE OBESITY DUE TO EXCESS CALORIES WITH SERIOUS COMORBIDITY AND BODY MASS INDEX (BMI) OF 36.0 TO 36.9 IN ADULT (HCC): ICD-10-CM

## 2021-11-29 DIAGNOSIS — Z00.00 ANNUAL PHYSICAL EXAM: Primary | ICD-10-CM

## 2021-11-29 DIAGNOSIS — E11.9 CONTROLLED TYPE 2 DIABETES MELLITUS WITHOUT COMPLICATION, WITHOUT LONG-TERM CURRENT USE OF INSULIN (HCC): ICD-10-CM

## 2021-11-29 LAB — SL AMB POCT HEMOGLOBIN AIC: 8.2 (ref ?–6.5)

## 2021-11-29 PROCEDURE — 1036F TOBACCO NON-USER: CPT | Performed by: FAMILY MEDICINE

## 2021-11-29 PROCEDURE — 3052F HG A1C>EQUAL 8.0%<EQUAL 9.0%: CPT | Performed by: FAMILY MEDICINE

## 2021-11-29 PROCEDURE — 3008F BODY MASS INDEX DOCD: CPT | Performed by: FAMILY MEDICINE

## 2021-11-29 PROCEDURE — 3725F SCREEN DEPRESSION PERFORMED: CPT | Performed by: FAMILY MEDICINE

## 2021-11-29 PROCEDURE — 3078F DIAST BP <80 MM HG: CPT | Performed by: FAMILY MEDICINE

## 2021-11-29 PROCEDURE — 99396 PREV VISIT EST AGE 40-64: CPT | Performed by: FAMILY MEDICINE

## 2021-11-29 PROCEDURE — 3074F SYST BP LT 130 MM HG: CPT | Performed by: FAMILY MEDICINE

## 2021-11-29 PROCEDURE — 83036 HEMOGLOBIN GLYCOSYLATED A1C: CPT | Performed by: FAMILY MEDICINE

## 2021-11-30 ENCOUNTER — TELEPHONE (OUTPATIENT)
Dept: FAMILY MEDICINE CLINIC | Facility: CLINIC | Age: 61
End: 2021-11-30

## 2021-12-05 DIAGNOSIS — E78.5 DYSLIPIDEMIA: ICD-10-CM

## 2021-12-05 DIAGNOSIS — I10 ESSENTIAL HYPERTENSION: ICD-10-CM

## 2021-12-05 RX ORDER — ATORVASTATIN CALCIUM 40 MG/1
TABLET, FILM COATED ORAL
Qty: 30 TABLET | Refills: 5 | Status: SHIPPED | OUTPATIENT
Start: 2021-12-05 | End: 2022-06-16

## 2021-12-05 RX ORDER — LISINOPRIL AND HYDROCHLOROTHIAZIDE 25; 20 MG/1; MG/1
TABLET ORAL
Qty: 30 TABLET | Refills: 5 | Status: SHIPPED | OUTPATIENT
Start: 2021-12-05 | End: 2022-07-05

## 2022-02-07 DIAGNOSIS — E11.9 CONTROLLED TYPE 2 DIABETES MELLITUS WITHOUT COMPLICATION, WITHOUT LONG-TERM CURRENT USE OF INSULIN (HCC): ICD-10-CM

## 2022-03-08 DIAGNOSIS — E11.9 CONTROLLED TYPE 2 DIABETES MELLITUS WITHOUT COMPLICATION, WITHOUT LONG-TERM CURRENT USE OF INSULIN (HCC): ICD-10-CM

## 2022-03-08 RX ORDER — EMPAGLIFLOZIN 25 MG/1
TABLET, FILM COATED ORAL
Qty: 30 TABLET | Refills: 0 | Status: SHIPPED | OUTPATIENT
Start: 2022-03-08 | End: 2022-03-08 | Stop reason: SDUPTHER

## 2022-05-03 ENCOUNTER — OFFICE VISIT (OUTPATIENT)
Dept: FAMILY MEDICINE CLINIC | Facility: CLINIC | Age: 62
End: 2022-05-03
Payer: COMMERCIAL

## 2022-05-03 VITALS
HEART RATE: 73 BPM | SYSTOLIC BLOOD PRESSURE: 100 MMHG | HEIGHT: 68 IN | TEMPERATURE: 98.5 F | WEIGHT: 247 LBS | DIASTOLIC BLOOD PRESSURE: 62 MMHG | OXYGEN SATURATION: 98 % | RESPIRATION RATE: 14 BRPM | BODY MASS INDEX: 37.44 KG/M2

## 2022-05-03 DIAGNOSIS — R55 SYNCOPE, UNSPECIFIED SYNCOPE TYPE: ICD-10-CM

## 2022-05-03 DIAGNOSIS — I10 ESSENTIAL HYPERTENSION: Primary | ICD-10-CM

## 2022-05-03 DIAGNOSIS — E11.9 TYPE 2 DIABETES MELLITUS WITHOUT COMPLICATION, WITHOUT LONG-TERM CURRENT USE OF INSULIN (HCC): ICD-10-CM

## 2022-05-03 PROCEDURE — 3078F DIAST BP <80 MM HG: CPT | Performed by: FAMILY MEDICINE

## 2022-05-03 PROCEDURE — 99214 OFFICE O/P EST MOD 30 MIN: CPT | Performed by: FAMILY MEDICINE

## 2022-05-03 PROCEDURE — 3008F BODY MASS INDEX DOCD: CPT | Performed by: FAMILY MEDICINE

## 2022-05-03 PROCEDURE — 3074F SYST BP LT 130 MM HG: CPT | Performed by: FAMILY MEDICINE

## 2022-05-03 PROCEDURE — 1036F TOBACCO NON-USER: CPT | Performed by: FAMILY MEDICINE

## 2022-05-03 PROCEDURE — 3725F SCREEN DEPRESSION PERFORMED: CPT | Performed by: FAMILY MEDICINE

## 2022-05-03 RX ORDER — LANCETS 33 GAUGE
EACH MISCELLANEOUS
Qty: 200 EACH | Refills: 3 | Status: SHIPPED | OUTPATIENT
Start: 2022-05-03

## 2022-05-03 RX ORDER — BLOOD-GLUCOSE METER
KIT MISCELLANEOUS
Qty: 1 KIT | Refills: 0 | Status: SHIPPED | OUTPATIENT
Start: 2022-05-03

## 2022-05-03 RX ORDER — LISINOPRIL AND HYDROCHLOROTHIAZIDE 12.5; 1 MG/1; MG/1
1 TABLET ORAL DAILY
Qty: 30 TABLET | Refills: 5 | Status: SHIPPED | OUTPATIENT
Start: 2022-05-03

## 2022-05-03 RX ORDER — BLOOD SUGAR DIAGNOSTIC
STRIP MISCELLANEOUS
Qty: 200 EACH | Refills: 3 | Status: SHIPPED | OUTPATIENT
Start: 2022-05-03

## 2022-05-03 NOTE — PROGRESS NOTES
Assessment/Plan:    1  Essential hypertension  Assessment & Plan:  Cut pill to half dose  Check bp daily    Orders:  -     lisinopril-hydrochlorothiazide (PRINZIDE,ZESTORETIC) 10-12 5 MG per tablet; Take 1 tablet by mouth daily    2  Type 2 diabetes mellitus without complication, without long-term current use of insulin [facility])  Assessment & Plan:  May need to change metformin   Will wait for new a1c  Lab Results   Component Value Date    HGBA1C 8 2 (A) 11/29/2021       Orders:  -     Blood Glucose Monitoring Suppl (OneTouch Verio Reflect) w/Device KIT; Check blood sugars twice daily  Please substitute with appropriate alternative as covered by patient's insurance  Dx: E11 65  -     glucose blood (OneTouch Verio) test strip; Check blood sugars twice daily  Please substitute with appropriate alternative as covered by patient's insurance  Dx: E11 65  -     OneTouch Delica Lancets 73X MISC; Check blood sugars twice daily  Please substitute with appropriate alternative as covered by patient's insurance  Dx: E11 65  -     Ambulatory referral to Diabetic Education; Future; Expected date: 05/03/2022    3  Syncope, unspecified syncope type  Assessment & Plan:  Likely due to dehydration        BMI Counseling: Body mass index is 37 73 kg/m²  The BMI is above normal  Nutrition recommendations include encouraging healthy choices of fruits and vegetables  Exercise recommendations include exercising 3-5 times per week  No pharmacotherapy was ordered  Rationale for BMI follow-up plan is due to patient being overweight or obese  Depression Screening and Follow-up Plan: Patient was screened for depression during today's encounter  They screened negative with a PHQ-2 score of 0  There are no Patient Instructions on file for this visit  Return in about 4 weeks (around 5/31/2022) for Recheck  Subjective:      Patient ID: Andrew Ashton is a 59 y o  female      Chief Complaint   Patient presents with    Follow-up Patient here for hospital follow up low blood pressure        Here for follow up  Was in [place] and had just eaten lunch, went to bathroom feeling pressure in lower abdomen  Felt nausea and did throw up  Was walking that day and had 2 days of diarrhea prior to that day  Did pass out possibly for a few minutes  Went via ambulance to hospital   Kept her overnight and hospital released her the next day      Hypertension  This is a chronic problem  The current episode started more than 1 year ago  The problem is controlled  There are no associated agents to hypertension  Past treatments include ACE inhibitors and diuretics  The current treatment provides significant improvement  There are no compliance problems  The following portions of the patient's history were reviewed and updated as appropriate: allergies, current medications, past family history, past medical history, past social history, past surgical history and problem list     Review of Systems   Constitutional: Negative  HENT: Negative  Eyes: Negative  Respiratory: Negative  Cardiovascular: Negative  Gastrointestinal: Negative  Endocrine: Negative  Genitourinary: Negative  Musculoskeletal: Negative  Skin: Negative  Allergic/Immunologic: Negative  Neurological: Negative  Hematological: Negative  Psychiatric/Behavioral: Negative            Current Outpatient Medications   Medication Sig Dispense Refill    atorvastatin (LIPITOR) 40 mg tablet TAKE 1 TABLET BY MOUTH EVERY DAY 30 tablet 5    Empagliflozin (Jardiance) 25 MG TABS Take 1 tablet (25 mg total) by mouth every morning 30 tablet 3    lisinopril-hydrochlorothiazide (PRINZIDE,ZESTORETIC) 20-25 MG per tablet TAKE 1 TABLET BY MOUTH EVERY DAY 30 tablet 5    metFORMIN (GLUCOPHAGE) 500 mg tablet TAKE 2 TABLETS BY MOUTH TWICE A DAY WITH FOOD 120 tablet 5    Multiple Vitamins-Minerals (ONE-A-DAY 50 PLUS PO) Take by mouth      Blood Glucose Monitoring Suppl (OneTouch Verio Reflect) w/Device KIT Check blood sugars twice daily  Please substitute with appropriate alternative as covered by patient's insurance  Dx: E11 65 1 kit 0    glucose blood (OneTouch Verio) test strip Check blood sugars twice daily  Please substitute with appropriate alternative as covered by patient's insurance  Dx: E11 65 200 each 3    lisinopril-hydrochlorothiazide (PRINZIDE,ZESTORETIC) 10-12 5 MG per tablet Take 1 tablet by mouth daily 30 tablet 5    OneTouch Delica Lancets 13M MISC Check blood sugars twice daily  Please substitute with appropriate alternative as covered by patient's insurance  Dx: E11 65 200 each 3     No current facility-administered medications for this visit  Objective:    /62 (BP Location: Left arm, Patient Position: Sitting, Cuff Size: Standard)   Pulse 73   Temp 98 5 °F (36 9 °C)   Resp 14   Ht 5' 7 84" (1 723 m)   Wt 112 kg (247 lb)   SpO2 98%   BMI 37 73 kg/m²        Physical Exam  Vitals and nursing note reviewed  Constitutional:       Appearance: Normal appearance  HENT:      Head: Normocephalic and atraumatic  Eyes:      Extraocular Movements: Extraocular movements intact  Pupils: Pupils are equal, round, and reactive to light  Cardiovascular:      Rate and Rhythm: Normal rate and regular rhythm  Pulses: Normal pulses  Heart sounds: Normal heart sounds  Pulmonary:      Effort: Pulmonary effort is normal       Breath sounds: Normal breath sounds  Abdominal:      General: Abdomen is flat  Palpations: Abdomen is soft  Musculoskeletal:      Cervical back: Normal range of motion and neck supple  Skin:     General: Skin is warm  Capillary Refill: Capillary refill takes less than 2 seconds  Neurological:      General: No focal deficit present  Mental Status: She is alert and oriented to person, place, and time     Psychiatric:         Mood and Affect: Mood normal          Behavior: Behavior normal          Thought Content:  Thought content normal          Judgment: Judgment normal                 Jason Prince, DO

## 2022-05-03 NOTE — ASSESSMENT & PLAN NOTE
May need to change metformin   Will wait for new a1c  Lab Results   Component Value Date    HGBA1C 8 2 (A) 11/29/2021

## 2022-06-09 ENCOUNTER — OFFICE VISIT (OUTPATIENT)
Dept: DERMATOLOGY | Facility: CLINIC | Age: 62
End: 2022-06-09
Payer: COMMERCIAL

## 2022-06-09 VITALS — BODY MASS INDEX: 36.37 KG/M2 | WEIGHT: 240 LBS | HEIGHT: 68 IN

## 2022-06-09 DIAGNOSIS — Z13.89 SCREENING FOR SKIN CONDITION: ICD-10-CM

## 2022-06-09 DIAGNOSIS — D22.9 ATYPICAL MOLE: ICD-10-CM

## 2022-06-09 DIAGNOSIS — D18.01 CHERRY ANGIOMA: Primary | ICD-10-CM

## 2022-06-09 DIAGNOSIS — B36.0 TINEA VERSICOLOR: ICD-10-CM

## 2022-06-09 DIAGNOSIS — D22.9 NEVUS: ICD-10-CM

## 2022-06-09 PROCEDURE — 3008F BODY MASS INDEX DOCD: CPT | Performed by: DERMATOLOGY

## 2022-06-09 PROCEDURE — 99203 OFFICE O/P NEW LOW 30 MIN: CPT | Performed by: DERMATOLOGY

## 2022-06-09 PROCEDURE — 1036F TOBACCO NON-USER: CPT | Performed by: DERMATOLOGY

## 2022-06-09 RX ORDER — KETOCONAZOLE 20 MG/ML
1 SHAMPOO TOPICAL WEEKLY
Qty: 120 ML | Refills: 5 | Status: SHIPPED | OUTPATIENT
Start: 2022-06-09

## 2022-06-09 NOTE — PATIENT INSTRUCTIONS
URRUTIA ANGIOMAS          Assessment and Plan:    Cherry angioma, also known as [employer] spots, are benign vascular skin lesions  A "cherry angioma" is a firm red, blue or purple papule, 0 1-1 cm in diameter  When thrombosed, they can appear black in colour until evaluated with a dermatoscope when the red or purple colour is more easily seen  Cherry angioma may develop on any part of the body but most often appear on the scalp, face, lips and trunk  An angioma is due to proliferating endothelial cells; these are the cells that line the inside of a blood vessel  Angiomas can arise in early life or later in life; the most common type of angioma is a cherry angioma  Cherry angiomas are very common in males and females of any age or race  They are more noticeable in white skin than in skin of colour  They markedly increase in number from about the age of 36  There may be a family history of similar lesions  Eruptive cherry angiomas have been rarely reported to be associated with internal malignancy  The cause of angiomas is unknown  Genetic analysis of cherry angiomas has shown that they frequently carry specific somatic missense mutations in the GNAQ and GNA11 (Q209H) genes, which are involved in other vascular and melanocytic proliferations  Cherry angioma is usually diagnosed clinically and no investigations are necessary for the majority of lesions  It has a characteristic red-clod or lobular pattern on dermatoscopy (called lacunar pattern using conventional pattern analysis)  When there is uncertainty about the diagnosis, a biopsy may be performed  The angioma is composed of venules in a thickened papillary dermis  Collagen bundles may be prominent between the lobules  Cherry angiomas are harmless, so they do not usually have to be treated   Occasionally, they are removed to exclude a malignant skin lesion such as a nodular melanoma or because they are irritated or bleeding (and a subsequent risk for infection)  To decrease friction over the lesions, we recommend Neutrogena Daily Defense SPF 50+ at least 3 times a day  Nadia reviewed the concept of ABCDE and ugly duckling nothing markedly atypical patient reassured    TINEA VERSICOLOR        What is tinea versicolor? Tinea versicolor or pityriasis versicolor is a type of fungal infection on the skin due to a yeast that lives on all of us  It Is due an overgrowth of a type of yeast called Malassezia furfur, which feeds on oils in the skin and thrives in warm, humid environments  Anyone can develop tinea versicolor, but it is more common during the summer months and in tropical climates  Those who tend to sweat more heavily are also at higher risk  Although it is not considered infectious, multiple family members can be affected  Teens and young adults are most susceptible due to having oily skin   Affects people of all skin colors   Weakened immune system predisposes to development     What are the clinical symptoms of tinea versicolor? The first sign of tinea versicolor is often spots on the skin  They can be lighter or darker than surrounding skin, with colors ranging from white, pink, tan, to brown  The spots are dry, scaly, and sometimes itchy   Can appear anywhere on the body, but more commonly over the neck, trunk, and arms   Spots can grow together forming larger patches   May disappear when temperature drops and return once it becomes warm again  Pale spots can be confused with vitiligo    How do we diagnose tinea versicolor? Tinea versicolor is usually diagnosed with a history and physical examination  However, the following tests may be useful for confirmation when in doubt    Wood lamp (black light) examination-- yellow-green glow may be observed in affected areas  Microscopy using potassium hydroxide (KOH) to examine skin scrapings  Fungal culture--this is usually reported to be negative, as it is quite difficult to persuade the yeasts to grow in a laboratory  Skin biopsy--fungal elements may be seen within the outer cells of the skin (stratum corneum) on histopathology    How do we treat tinea versicolor? There are many different options to treat tinea versicolor  The treatment chosen may depend on how thick the spots have grown and how much of the body has been affected  Mild tinea versicolor can be treated with primarily topical antifungal agents  These include:  Ketoconazole cream/shampoo  Selenium sulfide   Terbinafine gel   Ciclopirox cream/solution   Propylene glycol solution   Sodium thiosulphate solution   Topical medications should be applied widely to affected areas before bedtime for between three days to two weeks depending on your dermatologists recommendation  Use of medicated cleansers once or twice a month may prevent recurrence in those who have has multiple bouts of yeast overgrowth     For extensive skin involvement or after failure of topical medications, oral antifungal agents such as itraconazole and fluconazole can be used  Oral terbinafine used to treat dermatophyte infections is not effective against tinea versicolor     Vigorous exercise an hour after taking the medication may help sweat it onto the skin surface and enhance clearance of the yeast    Nothing else of concern noted on complete exam follow-up as needed

## 2022-06-09 NOTE — PROGRESS NOTES
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 presents for overall checkup concerned regarding a lesion noted on her right abdomen no other concerns noted  Past Medical History:   Diagnosis Date    Bronchitis 7/3/2019    Diabetes mellitus ([address] )     Hyperlipidemia     Hypertension     Vasovagal syncope 2020     Past Surgical History:   Procedure Laterality Date    APPENDECTOMY      age 8     COLONOSCOPY      IR BIOPSY LIVER MASS  2020    TONSILLECTOMY      age 7      Social History   Social History     Substance and Sexual Activity   Alcohol Use Never     Social History     Substance and Sexual Activity   Drug Use Never     Social History     Tobacco Use   Smoking Status Never Smoker   Smokeless Tobacco Never Used     Family History   Problem Relation Age of Onset    Diabetes Father     Atrial fibrillation Sister      Meds/Allergies   No Known Allergies    Meds:  Prior to Admission medications    Medication Sig Start Date End Date Taking? Authorizing Provider   atorvastatin (LIPITOR) 40 mg tablet TAKE 1 TABLET BY MOUTH EVERY DAY 21  Yes Josr Christina, DO   Blood Glucose Monitoring Suppl (OneTouch Verio Reflect) w/Device KIT Check blood sugars twice daily  Please substitute with appropriate alternative as covered by patient's insurance  Dx: E11 65 5/3/22  Yes Josr Christina, DO   Empagliflozin (Jardiance) 25 MG TABS Take 1 tablet (25 mg total) by mouth every morning 3/8/22  Yes Marianne Mayes, DO   glucose blood (OneTouch Verio) test strip Check blood sugars twice daily   Please substitute with appropriate alternative as covered by patient's insurance  Dx: E11 65 5/3/22  Yes Kelsy Ross, DO   lisinopril-hydrochlorothiazide (PRINZIDE,ZESTORETIC) 10-12 5 MG per tablet Take 1 tablet by mouth daily 5/3/22  Yes Marianne Mayes,    lisinopril-hydrochlorothiazide (PRINZIDE,ZESTORETIC) 20-25 MG per tablet TAKE 1 TABLET BY MOUTH EVERY DAY 12/5/21  Yes Kelsy Ross, DO   metFORMIN (GLUCOPHAGE) 500 mg tablet TAKE 2 TABLETS BY MOUTH TWICE A DAY WITH FOOD 2/7/22  Yes Regine Guaman MD   Multiple Vitamins-Minerals (ONE-A-DAY 50 PLUS PO) Take by mouth   Yes Historical Provider, MD   OneTouch Delica Lancets 20R MISC Check blood sugars twice daily  Please substitute with appropriate alternative as covered by patient's insurance   Dx: E11 65 5/3/22  Yes Kelsy Ross, DO       Subjective:     Review of Systems:    General: negative for - chills, fatigue, fever,  weight gain or weight loss  Psychological: negative for - anxiety, behavioral disorder, concentration difficulties, decreased libido, depression, irritability, memory difficulties, mood swings, sleep disturbances or suicidal ideation  ENT: negative for - hearing difficulties , nasal congestion, nasal discharge, oral lesions, sinus pain, sneezing, sore throat  Allergy and Immunology: negative for - hives, insect bite sensitivity,  Hematological and Lymphatic: negative for - bleeding problems, blood clots,bruising, swollen lymph nodes  Endocrine: negative for - hair pattern changes, hot flashes, malaise/lethargy, mood swings, palpitations, polydipsia/polyuria, skin changes, temperature intolerance or unexpected weight change  Respiratory: negative for - cough, hemoptysis, orthopnea, shortness of breath, or wheezing  Cardiovascular: negative for - chest pain, dyspnea on exertion, edema,  Gastrointestinal: negative for - abdominal pain, nausea/vomiting  Genito-Urinary: negative for - dysuria, incontinence, irregular/heavy menses or urinary frequency/urgency  Musculoskeletal: negative for - gait disturbance, joint pain, joint stiffness, joint swelling, muscle pain, muscular weakness  Dermatological:  As in HPI  Neurological: negative for confusion, dizziness, headaches, impaired coordination/balance, memory loss, numbness/tingling, seizures, speech problems, tremors or weakness       Objective:   Ht 5' 8" (1 727 m)   Wt 109 kg (240 lb)   BMI 36 49 kg/m²     Physical Exam:    General Appearance:    Alert, cooperative, no distress   Head:    Normocephalic, without obvious abnormality, atraumatic           Skin:   A full skin exam was performed including scalp, head scalp, eyes, ears, nose, lips, neck, chest, axilla, abdomen, back, buttocks, bilateral upper extremities, bilateral lower extremities, hands, feet, fingers, toes, fingernails, and toenails is purple papule noted on the right lower abdomen numerous red papules noted elsewhere normal pigmented lesion regular shape color nothing else markedly atypical except some obtain a scaly slightly hyperpigmented patches noted on the upper back nothing else remarkable noted on complete exam     Assessment:     1  Cherry angioma     2  Atypical mole  Ambulatory referral to Dermatology   3  Nevus     4  Tinea versicolor           Plan:   CHERRY ANGIOMAS          Assessment and Plan:    Cherry angioma, also known as Francis Cord spots, are benign vascular skin lesions  A "cherry angioma" is a firm red, blue or purple papule, 0 1-1 cm in diameter  When thrombosed, they can appear black in colour until evaluated with a dermatoscope when the red or purple colour is more easily seen  Cherry angioma may develop on any part of the body but most often appear on the scalp, face, lips and trunk  An angioma is due to proliferating endothelial cells; these are the cells that line the inside of a blood vessel  Angiomas can arise in early life or later in life; the most common type of angioma is a cherry angioma  Cherry angiomas are very common in males and females of any age or race  They are more noticeable in white skin than in skin of colour  They markedly increase in number from about the age of 36  There may be a family history of similar lesions  Eruptive cherry angiomas have been rarely reported to be associated with internal malignancy  The cause of angiomas is unknown  Genetic analysis of cherry angiomas has shown that they frequently carry specific somatic missense mutations in the GNAQ and GNA11 (Q209H) genes, which are involved in other vascular and melanocytic proliferations  Cherry angioma is usually diagnosed clinically and no investigations are necessary for the majority of lesions  It has a characteristic red-clod or lobular pattern on dermatoscopy (called lacunar pattern using conventional pattern analysis)  When there is uncertainty about the diagnosis, a biopsy may be performed  The angioma is composed of venules in a thickened papillary dermis  Collagen bundles may be prominent between the lobules  Cherry angiomas are harmless, so they do not usually have to be treated  Occasionally, they are removed to exclude a malignant skin lesion such as a nodular melanoma or because they are irritated or bleeding (and a subsequent risk for infection)  To decrease friction over the lesions, we recommend Neutrogena Daily Defense SPF 50+ at least 3 times a day  Nevi reviewed the concept of ABCDE and ugly duckling nothing markedly atypical patient reassured    TINEA VERSICOLOR         What is tinea versicolor? Tinea versicolor or pityriasis versicolor is a type of fungal infection on the skin due to a yeast that lives on all of us  It Is due an overgrowth of a type of yeast called Malassezia furfur, which feeds on oils in the skin and thrives in warm, humid environments  Anyone can develop tinea versicolor, but it is more common during the summer months and in tropical climates  Those who tend to sweat more heavily are also at higher risk   Although it is not considered infectious, multiple family members can be affected  - Teens and young adults are most susceptible due to having oily skin   - Affects people of all skin colors   - Weakened immune system predisposes to development     What are the clinical symptoms of tinea versicolor? The first sign of tinea versicolor is often spots on the skin  They can be lighter or darker than surrounding skin, with colors ranging from white, pink, tan, to brown  - The spots are dry, scaly, and sometimes itchy   - Can appear anywhere on the body, but more commonly over the neck, trunk, and arms   - Spots can grow together forming larger patches   - May disappear when temperature drops and return once it becomes warm again  - Pale spots can be confused with vitiligo    How do we diagnose tinea versicolor? Tinea versicolor is usually diagnosed with a history and physical examination  However, the following tests may be useful for confirmation when in doubt  - Wood lamp (black light) examination-- yellow-green glow may be observed in affected areas  - Microscopy using potassium hydroxide (KOH) to examine skin scrapings  - Fungal culture--this is usually reported to be negative, as it is quite difficult to persuade the yeasts to grow in a laboratory  - Skin biopsy--fungal elements may be seen within the outer cells of the skin (stratum corneum) on histopathology    How do we treat tinea versicolor? There are many different options to treat tinea versicolor  The treatment chosen may depend on how thick the spots have grown and how much of the body has been affected  Mild tinea versicolor can be treated with primarily topical antifungal agents   These include:  - Ketoconazole cream/shampoo  - Selenium sulfide   - Terbinafine gel   - Ciclopirox cream/solution   - Propylene glycol solution   - Sodium thiosulphate solution   Topical medications should be applied widely to affected areas before bedtime for between three days to two weeks depending on your dermatologists recommendation    - Use of medicated cleansers once or twice a month may prevent recurrence in those who have has multiple bouts of yeast overgrowth     For extensive skin involvement or after failure of topical medications, oral antifungal agents such as itraconazole and fluconazole can be used  Oral terbinafine used to treat dermatophyte infections is not effective against tinea versicolor    - Vigorous exercise an hour after taking the medication may help sweat it onto the skin surface and enhance clearance of the yeast    Nothing else of concern noted on complete exam follow-up as needed          Meño Walter MD  6/9/2022,1:03 PM    Portions of the record may have been created with voice recognition software   Occasional wrong word or "sound a like" substitutions may have occurred due to the inherent limitations of voice recognition software   Read the chart carefully and recognize, using context, where substitutions have occurred

## 2022-06-13 ENCOUNTER — TELEPHONE (OUTPATIENT)
Dept: FAMILY MEDICINE CLINIC | Facility: CLINIC | Age: 62
End: 2022-06-13

## 2022-06-13 NOTE — TELEPHONE ENCOUNTER
Pt had to cancel her appt today for her diabetes f/u  Pt wanted to reschedule, however, her appt is scheduled as OVL and there is no opening for an OVL til end of august which pt says is too far  Can her appt be 15 min or not ?     Please advise

## 2022-06-16 DIAGNOSIS — E78.5 DYSLIPIDEMIA: ICD-10-CM

## 2022-06-16 RX ORDER — ATORVASTATIN CALCIUM 40 MG/1
TABLET, FILM COATED ORAL
Qty: 30 TABLET | Refills: 0 | Status: SHIPPED | OUTPATIENT
Start: 2022-06-16 | End: 2022-07-13

## 2022-06-21 ENCOUNTER — TELEPHONE (OUTPATIENT)
Dept: FAMILY MEDICINE CLINIC | Facility: CLINIC | Age: 62
End: 2022-06-21

## 2022-06-21 NOTE — TELEPHONE ENCOUNTER
Left message for patient to call back to see if she is having her blood work done prior to her office Visit today

## 2022-06-25 ENCOUNTER — APPOINTMENT (OUTPATIENT)
Dept: LAB | Facility: CLINIC | Age: 62
End: 2022-06-25
Payer: COMMERCIAL

## 2022-06-25 DIAGNOSIS — I10 BENIGN HYPERTENSION: ICD-10-CM

## 2022-06-25 DIAGNOSIS — E78.5 DYSLIPIDEMIA: ICD-10-CM

## 2022-06-25 DIAGNOSIS — E11.9 CONTROLLED TYPE 2 DIABETES MELLITUS WITHOUT COMPLICATION, WITHOUT LONG-TERM CURRENT USE OF INSULIN (HCC): ICD-10-CM

## 2022-06-25 LAB
ALBUMIN SERPL BCP-MCNC: 3.9 G/DL (ref 3.5–5)
ALP SERPL-CCNC: 86 U/L (ref 34–104)
ALT SERPL W P-5'-P-CCNC: 27 U/L (ref 7–52)
ANION GAP SERPL CALCULATED.3IONS-SCNC: 10 MMOL/L (ref 4–13)
AST SERPL W P-5'-P-CCNC: 21 U/L (ref 13–39)
BILIRUB SERPL-MCNC: 1.09 MG/DL (ref 0.2–1)
BUN SERPL-MCNC: 22 MG/DL (ref 5–25)
CALCIUM SERPL-MCNC: 9.5 MG/DL (ref 8.4–10.2)
CHLORIDE SERPL-SCNC: 102 MMOL/L (ref 96–108)
CHOLEST SERPL-MCNC: 158 MG/DL
CO2 SERPL-SCNC: 27 MMOL/L (ref 21–32)
CREAT SERPL-MCNC: 0.73 MG/DL (ref 0.6–1.3)
CREAT UR-MCNC: 86.6 MG/DL
EST. AVERAGE GLUCOSE BLD GHB EST-MCNC: 183 MG/DL
GFR SERPL CREATININE-BSD FRML MDRD: 87 ML/MIN/1.73SQ M
GLUCOSE P FAST SERPL-MCNC: 152 MG/DL (ref 65–99)
HBA1C MFR BLD: 8 %
HDLC SERPL-MCNC: 53 MG/DL
LDLC SERPL CALC-MCNC: 70 MG/DL (ref 0–100)
MICROALBUMIN UR-MCNC: 7.4 MG/L (ref 0–20)
MICROALBUMIN/CREAT 24H UR: 9 MG/G CREATININE (ref 0–30)
POTASSIUM SERPL-SCNC: 4.1 MMOL/L (ref 3.5–5.3)
PROT SERPL-MCNC: 7.2 G/DL (ref 6.4–8.4)
SODIUM SERPL-SCNC: 139 MMOL/L (ref 135–147)
TRIGL SERPL-MCNC: 176 MG/DL
TSH SERPL DL<=0.05 MIU/L-ACNC: 2.02 UIU/ML (ref 0.45–4.5)

## 2022-06-25 PROCEDURE — 84443 ASSAY THYROID STIM HORMONE: CPT

## 2022-06-25 PROCEDURE — 82570 ASSAY OF URINE CREATININE: CPT

## 2022-06-25 PROCEDURE — 83036 HEMOGLOBIN GLYCOSYLATED A1C: CPT

## 2022-06-25 PROCEDURE — 3061F NEG MICROALBUMINURIA REV: CPT | Performed by: DERMATOLOGY

## 2022-06-25 PROCEDURE — 82043 UR ALBUMIN QUANTITATIVE: CPT

## 2022-06-25 PROCEDURE — 80053 COMPREHEN METABOLIC PANEL: CPT

## 2022-06-25 PROCEDURE — 80061 LIPID PANEL: CPT

## 2022-06-25 PROCEDURE — 3052F HG A1C>EQUAL 8.0%<EQUAL 9.0%: CPT | Performed by: DERMATOLOGY

## 2022-06-25 PROCEDURE — 36415 COLL VENOUS BLD VENIPUNCTURE: CPT

## 2022-07-05 ENCOUNTER — OFFICE VISIT (OUTPATIENT)
Dept: FAMILY MEDICINE CLINIC | Facility: CLINIC | Age: 62
End: 2022-07-05
Payer: COMMERCIAL

## 2022-07-05 VITALS
HEIGHT: 68 IN | TEMPERATURE: 98.4 F | HEART RATE: 72 BPM | DIASTOLIC BLOOD PRESSURE: 60 MMHG | OXYGEN SATURATION: 98 % | WEIGHT: 244.4 LBS | RESPIRATION RATE: 16 BRPM | SYSTOLIC BLOOD PRESSURE: 104 MMHG | BODY MASS INDEX: 37.04 KG/M2

## 2022-07-05 DIAGNOSIS — E11.9 CONTROLLED TYPE 2 DIABETES MELLITUS WITHOUT COMPLICATION, WITHOUT LONG-TERM CURRENT USE OF INSULIN (HCC): Primary | ICD-10-CM

## 2022-07-05 DIAGNOSIS — I10 BENIGN HYPERTENSION: ICD-10-CM

## 2022-07-05 DIAGNOSIS — E78.5 DYSLIPIDEMIA: ICD-10-CM

## 2022-07-05 PROBLEM — R55 SYNCOPE: Status: RESOLVED | Noted: 2022-05-03 | Resolved: 2022-07-05

## 2022-07-05 PROCEDURE — 99214 OFFICE O/P EST MOD 30 MIN: CPT | Performed by: FAMILY MEDICINE

## 2022-07-05 PROCEDURE — 3074F SYST BP LT 130 MM HG: CPT | Performed by: FAMILY MEDICINE

## 2022-07-05 PROCEDURE — 3078F DIAST BP <80 MM HG: CPT | Performed by: FAMILY MEDICINE

## 2022-07-05 NOTE — PROGRESS NOTES
Patient's shoes and socks removed  Right Foot/Ankle   Right Foot Inspection  Skin Exam: dry skin, callus and callus  Toe Exam: ROM and strength within normal limits  Sensory   Monofilament testing: absent    Vascular  Capillary refills: < 3 seconds  The right DP pulse is 2+  The right PT pulse is 2+  Left Foot/Ankle  Left Foot Inspection  Skin Exam: dry skin and callus  Toe Exam: ROM and strength within normal limits  Sensory   Monofilament testing: absent    Vascular  Capillary refills: < 3 seconds  The left DP pulse is 2+  The left PT pulse is 2+  Assign Risk Category  No deformity present  No loss of protective sensation  No weak pulses  Risk: 0        Assessment/Plan:    1  Controlled type 2 diabetes mellitus without complication, without long-term current use of insulin ([facility]  Assessment & Plan:  Working on walking  Will discuss other treatment options with pharmacist  Lab Results   Component Value Date    HGBA1C 8 0 (H) 06/25/2022       Orders:  -     Ambulatory referral to clinical pharmacy; Future  -     Ambulatory Referral to Podiatry; Future  -     Hemoglobin A1C; Future; Expected date: 11/01/2022    2  Benign hypertension  Assessment & Plan:  Stable on lower dose of lisinopril      3  Dyslipidemia  Assessment & Plan: On atorvastatin              There are no Patient Instructions on file for this visit  Return in about 4 months (around 11/5/2022) for Recheck  Subjective:      Patient ID: Carmel Pozo is a 57 y o  female  Chief Complaint   Patient presents with    Follow-up     Patient here for follow up       Here for follow up  Overall feeling well  Insurance will not pay for diabetes education classes  Discussed meeting with pharmacist turner for possible injectable discussion    Hypertension  This is a chronic problem  The current episode started more than 1 year ago  The problem is unchanged  The problem is controlled   There are no associated agents to hypertension  Past treatments include ACE inhibitors  The current treatment provides significant improvement  There are no compliance problems  Hyperlipidemia  This is a chronic problem  The current episode started more than 1 year ago  The problem is controlled  Recent lipid tests were reviewed and are normal  There are no known factors aggravating her hyperlipidemia  The current treatment provides significant improvement of lipids  There are no compliance problems  Diabetes  She presents for her follow-up diabetic visit  She has type 2 diabetes mellitus  Her disease course has been improving  There are no hypoglycemic associated symptoms  There are no diabetic associated symptoms  There are no hypoglycemic complications  Symptoms are improving  There are no diabetic complications  Risk factors for coronary artery disease include diabetes mellitus and dyslipidemia  Current diabetic treatment includes oral agent (dual therapy)  She is compliant with treatment all of the time  There is no change in her home blood glucose trend  The following portions of the patient's history were reviewed and updated as appropriate: allergies, current medications, past family history, past medical history, past social history, past surgical history and problem list     Review of Systems      Current Outpatient Medications   Medication Sig Dispense Refill    atorvastatin (LIPITOR) 40 mg tablet TAKE 1 TABLET BY MOUTH EVERY DAY 30 tablet 0    Blood Glucose Monitoring Suppl (OneTouch Verio Reflect) w/Device KIT Check blood sugars twice daily  Please substitute with appropriate alternative as covered by patient's insurance  Dx: E11 65 1 kit 0    Empagliflozin (Jardiance) 25 MG TABS Take 1 tablet (25 mg total) by mouth every morning 30 tablet 3    glucose blood (OneTouch Verio) test strip Check blood sugars twice daily  Please substitute with appropriate alternative as covered by patient's insurance   Dx: E11 65 200 each 3  ketoconazole (NIZORAL) 2 % shampoo Apply 1 application topically once a week Patient to lather with shampoo ahead to thigh rinse after 5 minutes 3 days in row then weekly to prevent recurrence 120 mL 5    lisinopril-hydrochlorothiazide (PRINZIDE,ZESTORETIC) 10-12 5 MG per tablet Take 1 tablet by mouth daily 30 tablet 5    metFORMIN (GLUCOPHAGE) 500 mg tablet TAKE 2 TABLETS BY MOUTH TWICE A DAY WITH FOOD 120 tablet 5    Multiple Vitamins-Minerals (ONE-A-DAY 50 PLUS PO) Take by mouth      OneTouch Delica Lancets 63M MISC Check blood sugars twice daily  Please substitute with appropriate alternative as covered by patient's insurance  Dx: E11 65 200 each 3     No current facility-administered medications for this visit  Objective:    /60 (BP Location: Left arm, Patient Position: Sitting, Cuff Size: Large)   Pulse 72   Temp 98 4 °F (36 9 °C) (Tympanic)   Resp 16   Ht 5' 8" (1 727 m)   Wt 111 kg (244 lb 6 4 oz)   SpO2 98%   BMI 37 16 kg/m²        Physical Exam  Vitals and nursing note reviewed  Constitutional:       Appearance: Normal appearance  HENT:      Head: Normocephalic and atraumatic  Eyes:      Extraocular Movements: Extraocular movements intact  Pupils: Pupils are equal, round, and reactive to light  Cardiovascular:      Rate and Rhythm: Normal rate and regular rhythm  Pulses: Normal pulses  no weak pulses          Dorsalis pedis pulses are 2+ on the right side and 2+ on the left side  Posterior tibial pulses are 2+ on the right side and 2+ on the left side  Heart sounds: Normal heart sounds  Pulmonary:      Effort: Pulmonary effort is normal       Breath sounds: Normal breath sounds  Abdominal:      General: Abdomen is flat  Palpations: Abdomen is soft  Musculoskeletal:      Cervical back: Normal range of motion and neck supple  Feet:    Feet:      Right foot:      Skin integrity: Callus and dry skin present        Left foot:      Skin integrity: Callus and dry skin present  Neurological:      General: No focal deficit present  Mental Status: She is alert and oriented to person, place, and time  Psychiatric:         Mood and Affect: Mood normal          Behavior: Behavior normal          Thought Content:  Thought content normal          Judgment: Judgment normal                 Canelo Vance DO

## 2022-07-05 NOTE — ASSESSMENT & PLAN NOTE
Working on walking  Will discuss other treatment options with pharmacist  Lab Results   Component Value Date    HGBA1C 8 0 (H) 06/25/2022

## 2022-07-13 DIAGNOSIS — E78.5 DYSLIPIDEMIA: ICD-10-CM

## 2022-07-13 RX ORDER — ATORVASTATIN CALCIUM 40 MG/1
TABLET, FILM COATED ORAL
Qty: 30 TABLET | Refills: 0 | Status: SHIPPED | OUTPATIENT
Start: 2022-07-13 | End: 2022-08-14

## 2022-07-15 DIAGNOSIS — E11.9 CONTROLLED TYPE 2 DIABETES MELLITUS WITHOUT COMPLICATION, WITHOUT LONG-TERM CURRENT USE OF INSULIN (HCC): ICD-10-CM

## 2022-07-15 RX ORDER — EMPAGLIFLOZIN 25 MG/1
TABLET, FILM COATED ORAL
Qty: 30 TABLET | Refills: 3 | Status: SHIPPED | OUTPATIENT
Start: 2022-07-15

## 2022-07-18 ENCOUNTER — CLINICAL SUPPORT (OUTPATIENT)
Dept: FAMILY MEDICINE CLINIC | Facility: CLINIC | Age: 62
End: 2022-07-18

## 2022-07-18 DIAGNOSIS — E11.9 CONTROLLED TYPE 2 DIABETES MELLITUS WITHOUT COMPLICATION, WITHOUT LONG-TERM CURRENT USE OF INSULIN (HCC): ICD-10-CM

## 2022-07-18 DIAGNOSIS — D3A.8 NEUROENDOCRINE TUMOR OF LIVER: Primary | ICD-10-CM

## 2022-07-18 NOTE — PROGRESS NOTES
[id number] [address] [facility]  Amanda Session, Pharmacist    Patient presents for Dexcom G6 Professional CGM education/training  Patient is aware this is a billable service  Waiver signed and scanned into patient's chart  Sensor expiration date: 1/21/23   Transmitter SN: [id number]  Patient has personal home blood sugar monitoring equipment: yes    Dexcom G6 Professional CGM successfully placed on patient   Transmitter has been cleaned with alcohol  Skin cleaned with alcohol  Insertion site: left abdomen  Sensor inserted and started   Communication with  verified  Patient will be unblinded    Patient instructions reviewed:   Fill out log sheet daily  Sensor is waterproof for showering and swimming, remove for hot tub, MRI  Remove if redness, bleeding, swelling, discomfort at site  Removal is in 7-10 days; return instructions provided  Patient will return Dexcom Professional CGM in 7-10 days for data download; data will be routed  to endocrinology for data interpretation  Next PharmD appt: 7/28/22 or later pending sensor dropoff and data download  interpretation    Pharmacist Tracking Tool  Reason For Outreach: Embedded Pharmacist  Demographics:  Intervention Method:  In Person  Type of Intervention: New  Topics Addressed: Diabetes  Pharmacologic Interventions: N/A  Non-Pharmacologic Interventions: Adherence addressed, Care coordination, Chart update, Cost, Disease state education and Pro CGM  Time:  Direct Patient Care: 45 mins   Care Coordination: 45 mins  Recommendation Recipient: Patient/Caregiver  Outcome: Accepted

## 2022-08-01 ENCOUNTER — CLINICAL SUPPORT (OUTPATIENT)
Dept: FAMILY MEDICINE CLINIC | Facility: CLINIC | Age: 62
End: 2022-08-01
Payer: COMMERCIAL

## 2022-08-01 DIAGNOSIS — E11.9 TYPE 2 DIABETES MELLITUS WITHOUT COMPLICATION, WITHOUT LONG-TERM CURRENT USE OF INSULIN (HCC): Primary | ICD-10-CM

## 2022-08-01 DIAGNOSIS — E11.9 CONTROLLED TYPE 2 DIABETES MELLITUS WITHOUT COMPLICATION, WITHOUT LONG-TERM CURRENT USE OF INSULIN (HCC): ICD-10-CM

## 2022-08-01 PROCEDURE — 95250 CONT GLUC MNTR PHYS/QHP EQP: CPT | Performed by: PHARMACIST

## 2022-08-01 NOTE — PROGRESS NOTES
[address] \[facility]\""  Patient returns for interpretation of CGM Results by embedded clinical pharmacist      Type of CGM: Dexcom PRO    Duration worn (dates): 10 days (7/18-7/28/22)    Current DM Regimen:  Jardiance 25mg daily   Metformin 1000mg BID    Hemoglobin A1C   Date Value   06/25/2022 8 0 % (H)   11/29/2021 8 2 (A)   03/27/2021 7 9 % (H)   03/16/2021 8 1 (A)   05/06/2020 6 9 (A)   06/30/2016 6 6 % (H)   08/04/2015 7 9 % (H)       Statistics from [facility] (attached):  Time in Target Range: 55%  Time Above Range: 45%  Time Below Range: 0%    Patterns identified:  Consistent 180mg/dL overnight into morning then jump to approx  250mg/dL after breakfast, dinner, and bedtime snack      Recommendations:  Medication Changes:   Seeing onc in August for input as well  GLP1 but has history of neuroendocrine tumor of liver  Basal insulin likely the better recommendation  Referrals: [facility] - discuss meal pairing/carb counting  Other: none    Pharmacist Tracking Tool  Reason For Outreach: Embedded Pharmacist  Demographics:  Intervention Method:  In Person  Type of Intervention: Follow-Up  Topics Addressed: Diabetes  Pharmacologic Interventions: Medication Initiation and Prevent or Manage MARIO  Non-Pharmacologic Interventions: Adherence addressed, Care coordination, Chart update, Cost, Disease state education, Home Monitoring and Pro CGM  Time:  Direct Patient Care: 30 mins  Care Coordination: 45 mins  Recommendation Recipient: Patient/Caregiver  Outcome: Accepted

## 2022-08-02 ENCOUNTER — DOCUMENTATION (OUTPATIENT)
Dept: ENDOCRINOLOGY | Facility: CLINIC | Age: 62
End: 2022-08-02
Payer: COMMERCIAL

## 2022-08-02 DIAGNOSIS — E11.9 TYPE 2 DIABETES MELLITUS WITHOUT COMPLICATION, WITHOUT LONG-TERM CURRENT USE OF INSULIN (HCC): Primary | ICD-10-CM

## 2022-08-02 PROCEDURE — 95251 CONT GLUC MNTR ANALYSIS I&R: CPT | Performed by: INTERNAL MEDICINE

## 2022-08-02 NOTE — PROGRESS NOTES
[employer]   Device used [employer] Use- Physician Provided Equipment    Indication     Type 2 Diabetes    More than 72 hours of data was reviewed  Report to be scanned to chart  Date Range:  July 18, 2022 to July 28, 2022    Analysis of data:   % time CGM used:  90%  Average Glucose:  179 mg/dL  Coefficient of Variation:  20%   SD :  36 mg dL   Time in Target Range:  55%   Time Above Range:  45% high and 5% very high  Time Below Range:  0%     Interpretation of data:  She has hyperglycemia post meals and she is on the higher side of the target range overnight  I do not have a food log but I suspect that modification to the diet would help improve the hyperglycemia  She would benefit from medical nutrition therapy  Additionally, a GLP one analog can be added to her regimen to improve the postprandial hyperglycemia

## 2022-08-06 ENCOUNTER — APPOINTMENT (EMERGENCY)
Dept: CT IMAGING | Facility: HOSPITAL | Age: 62
End: 2022-08-06
Payer: COMMERCIAL

## 2022-08-06 ENCOUNTER — HOSPITAL ENCOUNTER (EMERGENCY)
Facility: HOSPITAL | Age: 62
Discharge: HOME | End: 2022-08-06
Attending: EMERGENCY MEDICINE
Payer: COMMERCIAL

## 2022-08-06 VITALS
RESPIRATION RATE: 18 BRPM | BODY MASS INDEX: 35.88 KG/M2 | DIASTOLIC BLOOD PRESSURE: 60 MMHG | HEART RATE: 60 BPM | TEMPERATURE: 98 F | WEIGHT: 236 LBS | OXYGEN SATURATION: 95 % | SYSTOLIC BLOOD PRESSURE: 118 MMHG

## 2022-08-06 DIAGNOSIS — K59.00 CONSTIPATION: Primary | ICD-10-CM

## 2022-08-06 LAB
ANION GAP SERPL CALCULATED.3IONS-SCNC: 10 MMOL/L (ref 4–13)
BASOPHILS # BLD AUTO: 0.04 THOUSANDS/ΜL (ref 0–0.1)
BASOPHILS NFR BLD AUTO: 0 % (ref 0–1)
BUN SERPL-MCNC: 20 MG/DL (ref 5–25)
CALCIUM SERPL-MCNC: 9.2 MG/DL (ref 8.4–10.2)
CHLORIDE SERPL-SCNC: 100 MMOL/L (ref 96–108)
CO2 SERPL-SCNC: 28 MMOL/L (ref 21–32)
CREAT SERPL-MCNC: 0.77 MG/DL (ref 0.6–1.3)
EOSINOPHIL # BLD AUTO: 0.08 THOUSAND/ΜL (ref 0–0.61)
EOSINOPHIL NFR BLD AUTO: 1 % (ref 0–6)
ERYTHROCYTE [DISTWIDTH] IN BLOOD BY AUTOMATED COUNT: 15.7 % (ref 11.6–15.1)
GFR SERPL CREATININE-BSD FRML MDRD: 81 ML/MIN/1.73SQ M
GLUCOSE SERPL-MCNC: 132 MG/DL (ref 65–140)
HCT VFR BLD AUTO: 44.4 % (ref 34.8–46.1)
HGB BLD-MCNC: 14 G/DL (ref 11.5–15.4)
IMM GRANULOCYTES # BLD AUTO: 0.06 THOUSAND/UL (ref 0–0.2)
IMM GRANULOCYTES NFR BLD AUTO: 1 % (ref 0–2)
LYMPHOCYTES # BLD AUTO: 1.6 THOUSANDS/ΜL (ref 0.6–4.47)
LYMPHOCYTES NFR BLD AUTO: 14 % (ref 14–44)
MCH RBC QN AUTO: 28 PG (ref 26.8–34.3)
MCHC RBC AUTO-ENTMCNC: 31.5 G/DL (ref 31.4–37.4)
MCV RBC AUTO: 89 FL (ref 82–98)
MONOCYTES # BLD AUTO: 0.66 THOUSAND/ΜL (ref 0.17–1.22)
MONOCYTES NFR BLD AUTO: 6 % (ref 4–12)
NEUTROPHILS # BLD AUTO: 9.22 THOUSANDS/ΜL (ref 1.85–7.62)
NEUTS SEG NFR BLD AUTO: 78 % (ref 43–75)
NRBC BLD AUTO-RTO: 0 /100 WBCS
PLATELET # BLD AUTO: 249 THOUSANDS/UL (ref 149–390)
PMV BLD AUTO: 9.8 FL (ref 8.9–12.7)
POTASSIUM SERPL-SCNC: 3.7 MMOL/L (ref 3.5–5.3)
RBC # BLD AUTO: 5 MILLION/UL (ref 3.81–5.12)
SODIUM SERPL-SCNC: 138 MMOL/L (ref 135–147)
WBC # BLD AUTO: 11.66 THOUSAND/UL (ref 4.31–10.16)

## 2022-08-06 PROCEDURE — 96361 HYDRATE IV INFUSION ADD-ON: CPT

## 2022-08-06 PROCEDURE — 74177 CT ABD & PELVIS W/CONTRAST: CPT

## 2022-08-06 PROCEDURE — 36415 COLL VENOUS BLD VENIPUNCTURE: CPT

## 2022-08-06 PROCEDURE — 99284 EMERGENCY DEPT VISIT MOD MDM: CPT

## 2022-08-06 PROCEDURE — 85025 COMPLETE CBC W/AUTO DIFF WBC: CPT

## 2022-08-06 PROCEDURE — 99284 EMERGENCY DEPT VISIT MOD MDM: CPT | Performed by: EMERGENCY MEDICINE

## 2022-08-06 PROCEDURE — 80048 BASIC METABOLIC PNL TOTAL CA: CPT

## 2022-08-06 PROCEDURE — G1004 CDSM NDSC: HCPCS

## 2022-08-06 PROCEDURE — 96360 HYDRATION IV INFUSION INIT: CPT

## 2022-08-06 RX ORDER — DOCUSATE SODIUM 100 MG/1
100 CAPSULE, LIQUID FILLED ORAL EVERY 12 HOURS
Qty: 28 CAPSULE | Refills: 0 | Status: SHIPPED | OUTPATIENT
Start: 2022-08-06 | End: 2022-08-20

## 2022-08-06 RX ORDER — POLYETHYLENE GLYCOL 3350 17 G/17G
17 POWDER, FOR SOLUTION ORAL DAILY
Qty: 119 G | Refills: 0 | Status: SHIPPED | OUTPATIENT
Start: 2022-08-06 | End: 2022-08-13

## 2022-08-06 RX ADMIN — IOHEXOL 70 ML: 350 INJECTION, SOLUTION INTRAVENOUS at 17:58

## 2022-08-06 RX ADMIN — SODIUM CHLORIDE 500 ML: 0.9 INJECTION, SOLUTION INTRAVENOUS at 15:45

## 2022-08-06 NOTE — ED ATTENDING ATTESTATION
8/6/2022  IYajaira MD, saw and evaluated the patient  I have discussed the patient with the resident/non-physician practitioner and agree with the resident's/non-physician practitioner's findings, Plan of Care, and MDM as documented in the resident's/non-physician practitioner's note, except where noted  All available labs and Radiology studies were reviewed  I was present for key portions of any procedure(s) performed by the resident/non-physician practitioner and I was immediately available to provide assistance  At this point I agree with the current assessment done in the Emergency Department  I have conducted an independent evaluation of this patient a history and physical is as follows:  Patient arrives with but she feels like is constipation for the past day or day and a half  She states she has gone more than a day without having a stool the past, this feels different  She describes pressure sensation her rectum when she tries to defecate  She denies any nausea, vomiting  She reports some bloating sensation  She has not tried any specific medications for this  Denies additional systemic symptoms  Review of Systems - Negative except lower abdominal pain with defecation, bloating sensation  Physical Exam  Vitals and nursing note reviewed  Constitutional:       General: She is not in acute distress  Appearance: She is well-developed  HENT:      Head: Normocephalic and atraumatic  Eyes:      Pupils: Pupils are equal, round, and reactive to light  Cardiovascular:      Rate and Rhythm: Normal rate and regular rhythm  Heart sounds: Normal heart sounds  No murmur heard  Pulmonary:      Effort: Pulmonary effort is normal  No respiratory distress  Breath sounds: Normal breath sounds  No wheezing or rales  Abdominal:      General: Bowel sounds are normal  There is no distension  Palpations: Abdomen is soft  Tenderness:  There is no abdominal tenderness  There is no guarding or rebound  Musculoskeletal:         General: No deformity  Normal range of motion  Cervical back: Normal range of motion and neck supple  Lymphadenopathy:      Cervical: No cervical adenopathy  Skin:     Capillary Refill: Capillary refill takes less than 2 seconds  Findings: No erythema or rash  Neurological:      Mental Status: She is alert and oriented to person, place, and time  Cranial Nerves: No cranial nerve deficit  Motor: No abnormal muscle tone        Coordination: Coordination normal    Psychiatric:         Behavior: Behavior normal        Results Reviewed     Procedure Component Value Units Date/Time    Basic metabolic panel [[id number]] Collected: 08/06/22 1544    Lab Status: Final result Specimen: Blood from Arm, Right Updated: 08/06/22 1610     Sodium 138 mmol/L      Potassium 3 7 mmol/L      Chloride 100 mmol/L      CO2 28 mmol/L      ANION GAP 10 mmol/L      BUN 20 mg/dL      Creatinine 0 77 mg/dL      Glucose 132 mg/dL      Calcium 9 2 mg/dL      eGFR 81 ml/min/1 73sq m     Narrative:      [place] guidelines for Chronic Kidney Disease (CKD):     Stage 1 with normal or high GFR (GFR > 90 mL/min/1 73 square meters)    Stage 2 Mild CKD (GFR = 60-89 mL/min/1 73 square meters)    Stage 3A Moderate CKD (GFR = 45-59 mL/min/1 73 square meters)    Stage 3B Moderate CKD (GFR = 30-44 mL/min/1 73 square meters)    Stage 4 Severe CKD (GFR = 15-29 mL/min/1 73 square meters)    Stage 5 End Stage CKD (GFR <15 mL/min/1 73 square meters)  Note: GFR calculation is accurate only with a steady state creatinine    CBC and differential [[id number]]  (Abnormal) Collected: 08/06/22 1544    Lab Status: Final result Specimen: Blood from Arm, Right Updated: 08/06/22 8952     WBC 11 66 Thousand/uL      RBC 5 00 Million/uL      Hemoglobin 14 0 g/dL      Hematocrit 44 4 %      MCV 89 fL      MCH 28 0 pg      MCHC 31 5 g/dL      RDW 15 7 % MPV 9 8 fL      Platelets 674 Thousands/uL      nRBC 0 /100 WBCs      Neutrophils Relative 78 %      Immat GRANS % 1 %      Lymphocytes Relative 14 %      Monocytes Relative 6 %      Eosinophils Relative 1 %      Basophils Relative 0 %      Neutrophils Absolute 9 22 Thousands/µL      Immature Grans Absolute 0 06 Thousand/uL      Lymphocytes Absolute 1 60 Thousands/µL      Monocytes Absolute 0 66 Thousand/µL      Eosinophils Absolute 0 08 Thousand/µL      Basophils Absolute 0 04 Thousands/µL         CT abdomen pelvis with contrast   ED Interpretation by Donald Hale MD (08/06 1837)   FINDINGS:     ABDOMEN     LOWER CHEST:  No clinically significant abnormality identified in the visualized lower chest      LIVER/BILIARY TREE:  Again noted are innumerable hepatic lesions  These lesions demonstrate decreasing the hyper enhancement with the development of low attenuation areas within the spleen  The largest lesion in the segment 4/8 measures about 3 8 cm   previously measuring 5 4 cm  Additional lesion in the segment 6 measures 5 cm x 4 cm previously measuring 6 cm  A segment 7 lesion measures 3 9 x 3 3 cm, previously measuring 5 2 cm     GALLBLADDER:  No calcified gallstones  No pericholecystic inflammatory change      SPLEEN:  Unremarkable      PANCREAS:  Unremarkable      ADRENAL GLANDS:  Unremarkable      KIDNEYS/URETERS:  Unremarkable  No hydronephrosis  Bilateral renal cysts are noted  STOMACH AND BOWEL:  Moderate amount of fecal debris in the rectum and mild rectal wall thickening with mild perirectal infiltration  Abnormal dilation of bowel loops seen  APPE   NDIX:  No findings to suggest appendicitis      ABDOMINOPELVIC CAVITY:  No ascites  No pneumoperitoneum  No lymphadenopathy    Again noted is a mesenteric lesion with the calcification, measuring 1 7 cm, unchanged  A portacaval lymph node seen, measuring 1 8 cm, stable  Additional small adjacent lymph nodes are seen  VESSELS:  Celiac trunk appear unremarkable  SMA appear unremarkable  CARLOS appear unremarkable  Iliac vessels appear     PELVIS     REPRODUCTIVE ORGANS:    Heterogenous endometrium with thickening, also noted on the previous study of June 4, 2020     URINARY BLADDER:  Unremarkable      ABDOMINAL WALL/INGUINAL REGIONS:  No acute compression collapse of the  No gross lytic lesion     OSSEOUS STRUCTURES:  No acute fracture or destructive osseous lesion      IMPRESSION:  Moderate amount of fecal debris in the rectum  Mild perirectal infiltration with mild proctitis  No small bowel obstruction     Treatment response of the previously noted the lesion in the liver parenchyma as    suggested by decreasing  area of hyperenhancement in these lesions     Mesenteric lesion with calcification, stable     Heterogenous thickened endometrium can be evaluated with ultrasound, was also noted on the previous study in 2020  The study was marked in EPIC for immediate notification  Final Result by Lisa Hoskins MD (08/06 1834)   Moderate amount of fecal debris in the rectum   Mild perirectal infiltration with mild proctitis   No small bowel obstruction      Treatment response of the previously noted the lesion in the liver parenchyma as suggested by decreasing  area of hyperenhancement in these lesions      Mesenteric lesion with calcification, stable      Heterogenous thickened endometrium can be evaluated with ultrasound, was also noted on the previous study in 2020   The study was marked in EPIC for immediate notification  Workstation performed: [id number]           Will treat symptoms with Colace, MiraLax  Patient aware of incidental findings, will need follow with her outpatient physicians for non emergent imaging      ED Course         Critical Care Time  Procedures

## 2022-08-06 NOTE — ED NOTES
Pt had sudden need to move bowels, was incontinent of small amount in room and then states she had large bowel movement in bathroom  Reports relief of "most of the pain" but believes she "still has some to get out"       Andreia Arora RN  08/06/22 5082

## 2022-08-06 NOTE — ED PROVIDER NOTES
History  Chief Complaint   Patient presents with    Constipation     Pt reports consitpation since Thursday  Takes blueberry's for it  HPI Munir Brooke is a 55yoF with PMH of DMT2, HTN, HLD and PSH of appendectomy at the age of 8 years who presents to the ED via EMS for constipation x1 day  She hasn't had a BM in 2 days, she feels like she has to go and can feel stool in the rectal vault but cannot pass it  She has tried drinking water and eating fruits, she did not have any stool softeners  She feels lower abdominal pain when she bares down  Her last BM two days ago was normal, no blood in the stool  Denies urinary complaints  Denies radiating or unilateral abdominal pain  She tried to disimpact herself but became 'woozy' and felt like she might pass out so she called EMS  She denies cp, sob, n/v, f/c      Prior to Admission Medications   Prescriptions Last Dose Informant Patient Reported? Taking? Blood Glucose Monitoring Suppl (OneTouch Verio Reflect) w/Device KIT   No No   Sig: Check blood sugars twice daily  Please substitute with appropriate alternative as covered by patient's insurance  Dx: E11 65   Jardiance 25 MG TABS   No No   Sig: TAKE 1 TABLET BY MOUTH EVERY DAY IN THE MORNING   Multiple Vitamins-Minerals (ONE-A-DAY 50 PLUS PO)  Self Yes No   Sig: Take by mouth   OneTouch Delica Lancets 20P MISC   No No   Sig: Check blood sugars twice daily  Please substitute with appropriate alternative as covered by patient's insurance  Dx: E11 65   atorvastatin (LIPITOR) 40 mg tablet   No No   Sig: TAKE 1 TABLET BY MOUTH EVERY DAY   glucose blood (OneTouch Verio) test strip   No No   Sig: Check blood sugars twice daily  Please substitute with appropriate alternative as covered by patient's insurance   Dx: E11 65   ketoconazole (NIZORAL) 2 % shampoo   No No   Sig: Apply 1 application topically once a week Patient to lather with shampoo ahead to thigh rinse after 5 minutes 3 days in row then weekly to prevent recurrence   lisinopril-hydrochlorothiazide (PRINZIDE,ZESTORETIC) 10-12 5 MG per tablet   No No   Sig: Take 1 tablet by mouth daily   metFORMIN (GLUCOPHAGE) 500 mg tablet   No No   Sig: TAKE 2 TABLETS BY MOUTH TWICE A DAY WITH FOOD      Facility-Administered Medications: None       Past Medical History:   Diagnosis Date    Bronchitis 7/3/2019    Diabetes mellitus ([address] )     Hyperlipidemia     Hypertension     Syncope 5/3/2022    Vasovagal syncope 6/8/2020       Past Surgical History:   Procedure Laterality Date    APPENDECTOMY      age 9     COLONOSCOPY      IR BIOPSY LIVER MASS  7/1/2020    TONSILLECTOMY      age 7        Family History   Problem Relation Age of Onset    Diabetes Father     Atrial fibrillation Sister      I have reviewed and agree with the history as documented  E-Cigarette/Vaping    E-Cigarette Use Never User      E-Cigarette/Vaping Substances     Social History     Tobacco Use    Smoking status: Never Smoker    Smokeless tobacco: Never Used   Vaping Use    Vaping Use: Never used   Substance Use Topics    Alcohol use: Never    Drug use: Never        Review of Systems   Constitutional: Negative for chills, fever and unexpected weight change  Respiratory: Negative for shortness of breath  Cardiovascular: Negative for chest pain  Gastrointestinal: Positive for abdominal distention (bloating), abdominal pain, constipation and rectal pain  Negative for anal bleeding, blood in stool, diarrhea, nausea and vomiting  Genitourinary: Negative for difficulty urinating, dysuria, frequency, hematuria, pelvic pain and urgency  All other systems reviewed and are negative        Physical Exam  ED Triage Vitals   Temperature Pulse Respirations Blood Pressure SpO2   08/06/22 1452 08/06/22 1452 08/06/22 1452 08/06/22 1452 08/06/22 1452   98 °F (36 7 °C) 55 18 112/80 98 %      Temp Source Heart Rate Source Patient Position - Orthostatic VS BP Location FiO2 (%)   08/06/22 1452 08/06/22 1452 08/06/22 1452 08/06/22 1452 --   Oral Monitor Lying Right arm       Pain Score       08/06/22 1500       8             Orthostatic Vital Signs  Vitals:    08/06/22 1452 08/06/22 1500 08/06/22 1730   BP: 112/80 112/80 118/60   Pulse: 55 56 60   Patient Position - Orthostatic VS: Lying  Lying       Physical Exam  Vitals and nursing note reviewed  Constitutional:       General: She is not in acute distress  Appearance: Normal appearance  She is not ill-appearing, toxic-appearing or diaphoretic  HENT:      Head: Normocephalic and atraumatic  Nose: Nose normal    Eyes:      Extraocular Movements: Extraocular movements intact  Cardiovascular:      Rate and Rhythm: Normal rate and regular rhythm  Pulses: Normal pulses  Heart sounds: Normal heart sounds  Pulmonary:      Effort: Pulmonary effort is normal       Breath sounds: Normal breath sounds  Abdominal:      General: There is no distension  Palpations: Abdomen is soft  There is no mass  Tenderness: There is no abdominal tenderness  There is no guarding or rebound  Hernia: No hernia is present  Musculoskeletal:      Cervical back: Normal range of motion  Skin:     General: Skin is dry  Neurological:      Mental Status: She is alert and oriented to person, place, and time  Mental status is at baseline           ED Medications  Medications   sodium chloride 0 9 % bolus 500 mL (500 mL Intravenous New Bag 8/6/22 1545)   iohexol (OMNIPAQUE) 350 MG/ML injection (SINGLE-DOSE) 70 mL (70 mL Intravenous Given 8/6/22 1758)       Diagnostic Studies  Results Reviewed     Procedure Component Value Units Date/Time    Basic metabolic panel [[id number]] Collected: 08/06/22 1544    Lab Status: Final result Specimen: Blood from Arm, Right Updated: 08/06/22 1610     Sodium 138 mmol/L      Potassium 3 7 mmol/L      Chloride 100 mmol/L      CO2 28 mmol/L      ANION GAP 10 mmol/L      BUN 20 mg/dL      Creatinine 0 77 mg/dL Glucose 132 mg/dL      Calcium 9 2 mg/dL      eGFR 81 ml/min/1 73sq m     Narrative:      [employer] guidelines for Chronic Kidney Disease (CKD):     Stage 1 with normal or high GFR (GFR > 90 mL/min/1 73 square meters)    Stage 2 Mild CKD (GFR = 60-89 mL/min/1 73 square meters)    Stage 3A Moderate CKD (GFR = 45-59 mL/min/1 73 square meters)    Stage 3B Moderate CKD (GFR = 30-44 mL/min/1 73 square meters)    Stage 4 Severe CKD (GFR = 15-29 mL/min/1 73 square meters)    Stage 5 End Stage CKD (GFR <15 mL/min/1 73 square meters)  Note: GFR calculation is accurate only with a steady state creatinine    CBC and differential [[id number]]  (Abnormal) Collected: 08/06/22 1544    Lab Status: Final result Specimen: Blood from Arm, Right Updated: 08/06/22 6124     WBC 11 66 Thousand/uL      RBC 5 00 Million/uL      Hemoglobin 14 0 g/dL      Hematocrit 44 4 %      MCV 89 fL      MCH 28 0 pg      MCHC 31 5 g/dL      RDW 15 7 %      MPV 9 8 fL      Platelets 753 Thousands/uL      nRBC 0 /100 WBCs      Neutrophils Relative 78 %      Immat GRANS % 1 %      Lymphocytes Relative 14 %      Monocytes Relative 6 %      Eosinophils Relative 1 %      Basophils Relative 0 %      Neutrophils Absolute 9 22 Thousands/µL      Immature Grans Absolute 0 06 Thousand/uL      Lymphocytes Absolute 1 60 Thousands/µL      Monocytes Absolute 0 66 Thousand/µL      Eosinophils Absolute 0 08 Thousand/µL      Basophils Absolute 0 04 Thousands/µL                  CT abdomen pelvis with contrast   ED Interpretation by Rafal Lafleur MD (08/06 1837)   FINDINGS:     ABDOMEN     LOWER CHEST:  No clinically significant abnormality identified in the visualized lower chest      LIVER/BILIARY TREE:  Again noted are innumerable hepatic lesions  These lesions demonstrate decreasing the hyper enhancement with the development of low attenuation areas within the spleen    The largest lesion in the segment 4/8 measures about 3 8 cm previously measuring 5 4 cm  Additional lesion in the segment 6 measures 5 cm x 4 cm previously measuring 6 cm  A segment 7 lesion measures 3 9 x 3 3 cm, previously measuring 5 2 cm     GALLBLADDER:  No calcified gallstones  No pericholecystic inflammatory change      SPLEEN:  Unremarkable      PANCREAS:  Unremarkable      ADRENAL GLANDS:  Unremarkable      KIDNEYS/URETERS:  Unremarkable  No hydronephrosis  Bilateral renal cysts are noted  STOMACH AND BOWEL:  Moderate amount of fecal debris in the rectum and mild rectal wall thickening with mild perirectal infiltration  Abnormal dilation of bowel loops seen  APPE   NDIX:  No findings to suggest appendicitis      ABDOMINOPELVIC CAVITY:  No ascites  No pneumoperitoneum  No lymphadenopathy  Again noted is a mesenteric lesion with the calcification, measuring 1 7 cm, unchanged  A portacaval lymph node seen, measuring 1 8 cm, stable  Additional small adjacent lymph nodes are seen  VESSELS:  Celiac trunk appear unremarkable  SMA appear unremarkable  CARLOS appear unremarkable  Iliac vessels appear     PELVIS     REPRODUCTIVE ORGANS:    Heterogenous endometrium with thickening, also noted on the previous study of June 4, 2020     URINARY BLADDER:  Unremarkable      ABDOMINAL WALL/INGUINAL REGIONS:  No acute compression collapse of the  No gross lytic lesion     OSSEOUS STRUCTURES:  No acute fracture or destructive osseous lesion      IMPRESSION:  Moderate amount of fecal debris in the rectum  Mild perirectal infiltration with mild proctitis  No small bowel obstruction     Treatment response of the previously noted the lesion in the liver parenchyma as    suggested by decreasing  area of hyperenhancement in these lesions     Mesenteric lesion with calcification, stable     Heterogenous thickened endometrium can be evaluated with ultrasound, was also noted on the previous study in 2020  The study was marked in EPIC for immediate notification         Final Result by Rafita Pierce MD (08/06 3104)   Moderate amount of fecal debris in the rectum   Mild perirectal infiltration with mild proctitis   No small bowel obstruction      Treatment response of the previously noted the lesion in the liver parenchyma as suggested by decreasing  area of hyperenhancement in these lesions      Mesenteric lesion with calcification, stable      Heterogenous thickened endometrium can be evaluated with ultrasound, was also noted on the previous study in 2020   The study was marked in EPIC for immediate notification  Workstation performed: [id number]               Procedures  Procedures      ED Course                                       MDM  Number of Diagnoses or Management Options  Constipation: established and improving  Diagnosis management comments: 55yoF who presents for constipation  Attempted manual disimpaction at bedside -- did not appreciate solid stool ball  Shortly after, patient was able to have a BM  Labs unremarkable  CT a/p did not show evidence of acute intraabdominal pathology but did show significant stool burden  The reading radiologist did note e/o thickened endometrium -- when I spoke to patient about this incidental finding, she states she was already aware  I encouraged her to follow up with her PCP if she has additional questions or would like to have further evaluation with outpatient ultrasound  Sent patient home with a script for colace and miralax  She is discharged to home in stable condition          Amount and/or Complexity of Data Reviewed  Clinical lab tests: ordered and reviewed  Tests in the radiology section of CPT®: ordered and reviewed  Independent visualization of images, tracings, or specimens: yes    Patient Progress  Patient progress: improved      Disposition  Final diagnoses:   Constipation     Time reflects when diagnosis was documented in both MDM as applicable and the Disposition within this note     Time User Action Codes Description Comment    8/6/2022  6:17 PM John Smith Liu [[id number]] Constipation       ED Disposition     ED Disposition   Discharge    Condition   Stable    Date/Time   Sat Aug 6, 2022  6:39 PM    Comment   Kane Castro discharge to home/self care  Follow-up Information     Follow up With Specialties Details Why Contact Yuliet Cao, DO Family Medicine In 3 days to discuss abnormal CT findings [address] [place] [id number]            Patient's Medications   Discharge Prescriptions    DOCUSATE SODIUM (COLACE) 100 MG CAPSULE    Take 1 capsule (100 mg total) by mouth every 12 (twelve) hours for 14 days       Start Date: 8/6/2022  End Date: 8/20/2022       Order Dose: 100 mg       Quantity: 28 capsule    Refills: 0    POLYETHYLENE GLYCOL (MIRALAX) 17 G PACKET    Take 17 g by mouth daily for 7 days       Start Date: 8/6/2022  End Date: 8/13/2022       Order Dose: 17 g       Quantity: 119 g    Refills: 0     No discharge procedures on file  PDMP Review     None           ED Provider  Attending physically available and evaluated Kane Castro I managed the patient along with the ED Attending      Electronically Signed by         Gerda Ariza MD  08/06/22 7134

## 2022-08-06 NOTE — DISCHARGE INSTRUCTIONS
On your CT scan, the radiologist noted "Heterogenous thickened endometrium can be evaluated with ultrasound, was also noted on the previous study in 2020"  Please be sure to follow up with your PCP to discuss these findings, as well as schedule an outpatient ultrasound for further evaluation

## 2022-08-08 ENCOUNTER — TELEPHONE (OUTPATIENT)
Dept: FAMILY MEDICINE CLINIC | Facility: CLINIC | Age: 62
End: 2022-08-08

## 2022-08-08 NOTE — TELEPHONE ENCOUNTER
LM for patient to schedule appt in person for Dexcom Pro review, medication overview, and possible recommendations for end of August with oncology  Requested call  Back to schedule         Pharmacist Tracking Tool  Reason For Outreach: Embedded Pharmacist  Demographics:  Intervention Method: Phone  Type of Intervention: Follow-Up  Topics Addressed: Diabetes  Pharmacologic Interventions: N/A  Non-Pharmacologic Interventions: Care coordination and Chart update  Time:  Direct Patient Care: 5 mins  Care Coordination: 5 mins  Recommendation Recipient: Patient/Caregiver  Outcome: Accepted

## 2022-08-09 ENCOUNTER — OFFICE VISIT (OUTPATIENT)
Dept: PODIATRY | Facility: CLINIC | Age: 62
End: 2022-08-09
Payer: COMMERCIAL

## 2022-08-09 VITALS — WEIGHT: 236 LBS | HEIGHT: 68 IN | BODY MASS INDEX: 35.77 KG/M2

## 2022-08-09 DIAGNOSIS — M20.62 ACQUIRED DEFORMITY OF LEFT TOE: ICD-10-CM

## 2022-08-09 DIAGNOSIS — L85.3 XEROSIS OF SKIN: ICD-10-CM

## 2022-08-09 DIAGNOSIS — L85.1 ACQUIRED KERATODERMA: ICD-10-CM

## 2022-08-09 DIAGNOSIS — E11.9 CONTROLLED TYPE 2 DIABETES MELLITUS WITHOUT COMPLICATION, WITHOUT LONG-TERM CURRENT USE OF INSULIN (HCC): Primary | ICD-10-CM

## 2022-08-09 PROCEDURE — 99203 OFFICE O/P NEW LOW 30 MIN: CPT | Performed by: PODIATRIST

## 2022-08-09 RX ORDER — AMMONIUM LACTATE 12 G/100G
CREAM TOPICAL AS NEEDED
Qty: 385 G | Refills: 3 | Status: SHIPPED | OUTPATIENT
Start: 2022-08-09

## 2022-08-09 NOTE — PATIENT INSTRUCTIONS
Foot Care for People with Diabetes   AMBULATORY CARE:   What you need to know about foot care: Foot care helps protect your feet and prevent foot ulcers or sores  Long-term high blood sugar levels can damage the blood vessels and nerves in your legs and feet  This damage makes it hard to feel pressure, pain, temperature, and touch  You may not be able to feel a cut or sore, or shoes that are too tight  Foot care is needed to prevent serious problems, such as an infection or amputation  Diabetes may cause your toes to become crooked or curved under  These changes may affect the way you walk and can lead to increased pressure on your foot  The pressure can decrease blood flow to your feet  Lack of blood flow increases your risk for a foot ulcer  Do not ignore small problems, such as dry skin or small wounds  These can become life-threatening over time without proper care  Call your care team provider if:   Your feet become numb, weak, or hard to move  You have pus draining from a sore on your foot  You have a wound on your foot that gets bigger, deeper, or does not heal      You see blisters, cuts, scratches, calluses, or sores on your foot  You have a fever, and your feet become red, warm, and swollen  Your toenails become thick, curled, or yellow  You find it hard to check your feet because your vision is poor  You have questions or concerns about your condition or care  How to care for your feet:   Check your feet each day  Look at your whole foot, including the bottom, and between and under your toes  Check for wounds, corns, and calluses  Use a mirror to see the bottom of your feet  The skin on your feet may be shiny, tight, or darker than normal  Your feet may also be cold and pale  Feel your feet by running your hands along the tops, bottoms, sides, and between your toes  Redness, swelling, and warmth are signs of blood flow problems that can lead to a foot ulcer   Do not try to remove corns or calluses yourself  Wash your feet each day with soap and warm water  Do not use hot water, because this can injure your foot  Dry your feet gently with a towel after you wash them  Dry between and under your toes  Apply lotion or a moisturizer on your dry feet  Ask your care team provider what lotions are best to use  Do not put lotion or moisturizer between your toes  Moisture between your toes could lead to skin breakdown  Cut your toenails correctly  File or cut your toenails straight across  Use a soft brush to clean around your toenails  If your toenails are very thick, you may need to have a care team provider or specialist cut them  Protect your feet  Do not walk barefoot or wear your shoes without socks  Check your shoes for rocks or other objects that can hurt your feet  Wear cotton socks to help keep your feet dry  Wear socks without toe seams, or wear them with the seams inside out  Change your socks each day  Do not wear socks that are dirty or damp  Wear shoes that fit well  Wear shoes that do not rub against any area of your feet  Your shoes should be ½ to ¾ inch (1 to 2 centimeters) longer than your feet  Your shoes should also have extra space around the widest part of your feet  Walking or athletic shoes with laces or straps that adjust are best  Ask your care team provider for help to choose shoes that fit you best  Ask him or her if you need to wear an insert, orthotic, or bandage on your feet  Go to your follow-up visits  Your care team provider will do a foot exam at least once a year  You may need a foot exam more often if you have nerve damage, foot deformities, or ulcers  He will check for nerve damage and how well you can feel your feet  He will check your shoes to see if they fit well  Do not smoke  Smoking can damage your blood vessels and put you at increased risk for foot ulcers   Ask your care team provider for information if you currently smoke and need help to quit  E-cigarettes or smokeless tobacco still contain nicotine  Talk to your care team provider before you use these products  Follow up with your diabetes care team provider or foot specialist as directed: You will need to have your feet checked at least once a year  You may need a foot exam more often if you have nerve damage, foot deformities, or ulcers  Write down your questions so you remember to ask them during your visits  © Copyright [employer] 2022 Information is for End User's use only and may not be sold, redistributed or otherwise used for commercial purposes  All illustrations and images included in CareNotes® are the copyrighted property of A D A M , Inc  or [facility] Sheila Jacome   The above information is an  only  It is not intended as medical advice for individual conditions or treatments  Talk to your doctor, nurse or pharmacist before following any medical regimen to see if it is safe and effective for you

## 2022-08-09 NOTE — PROGRESS NOTES
PATIENT:  Yuni Bailey    1960    ASSESSMENT:     1  Controlled type 2 diabetes mellitus without complication, without long-term current use of insulin ([address] )  Ambulatory Referral to Podiatry   2  Acquired deformity of left toe     3  Xerosis of skin  ammonium lactate (LAC-HYDRIN) 12 % cream   4  Acquired keratoderma           PLAN:  1  Patient was counseled on the condition and diagnosis  2   Educated disease prevention and risks related to diabetes  3   Educated proper daily foot care and exam   Instructed proper skin care / protection and footwear  Instructed to identify any signs of infection and related foot problem  4   The recent blood work was reviewed / discussed and the last HbA1c was 8 0  Discussed proper blood glucose control with diet and exercise  5  HPK trimmed and dispensed toe spacers  Discussed proper off-loading  6  Rx: Ammonium lactate cream for dry skin in her feet and heels  7  The patient will return in 6 months for diabetic foot exam       Imaging: I have personally reviewed pertinent films in PACS  Labs, pathology, and Other Studies: I have personally reviewed pertinent reports  Subjective:          HPI  The patient was referred to my office for diabetic foot evaluation  The patient has diabetes for about 2 years  The blood glucose is under control  The patient denied any history of diabetic foot ulcer, related foot infection, or amputation  No significant numbness or paresthesia  Denied weakness or significant functional deficit  The patient presents with sneakers  She complained of pain in left 4th toe  She also has dry skin / calluses in her heels  She denied any acute pedal problems        The following portions of the patient's history were reviewed and updated as appropriate: allergies, current medications, past family history, past medical history, past social history, past surgical history and problem list   All pertinent labs and images were reviewed  Past Medical History  Past Medical History:   Diagnosis Date    Bronchitis 7/3/2019    Diabetes mellitus ([facility] [address] )     Hyperlipidemia     Hypertension     Syncope 5/3/2022    Vasovagal syncope 6/8/2020       Past Surgical History  Past Surgical History:   Procedure Laterality Date    APPENDECTOMY      age 9     COLONOSCOPY      IR BIOPSY LIVER MASS  7/1/2020    TONSILLECTOMY      age 7         Allergies:  Patient has no known allergies  Medications:  Current Outpatient Medications   Medication Sig Dispense Refill    ammonium lactate (LAC-HYDRIN) 12 % cream Apply topically as needed for dry skin 385 g 3    atorvastatin (LIPITOR) 40 mg tablet TAKE 1 TABLET BY MOUTH EVERY DAY 30 tablet 0    Blood Glucose Monitoring Suppl (OneTouch Verio Reflect) w/Device KIT Check blood sugars twice daily  Please substitute with appropriate alternative as covered by patient's insurance  Dx: E11 65 1 kit 0    docusate sodium (COLACE) 100 mg capsule Take 1 capsule (100 mg total) by mouth every 12 (twelve) hours for 14 days 28 capsule 0    glucose blood (OneTouch Verio) test strip Check blood sugars twice daily  Please substitute with appropriate alternative as covered by patient's insurance  Dx: E11 65 200 each 3    Jardiance 25 MG TABS TAKE 1 TABLET BY MOUTH EVERY DAY IN THE MORNING 30 tablet 3    ketoconazole (NIZORAL) 2 % shampoo Apply 1 application topically once a week Patient to lather with shampoo ahead to thigh rinse after 5 minutes 3 days in row then weekly to prevent recurrence 120 mL 5    lisinopril-hydrochlorothiazide (PRINZIDE,ZESTORETIC) 10-12 5 MG per tablet Take 1 tablet by mouth daily 30 tablet 5    metFORMIN (GLUCOPHAGE) 500 mg tablet TAKE 2 TABLETS BY MOUTH TWICE A DAY WITH FOOD 120 tablet 5    Multiple Vitamins-Minerals (ONE-A-DAY 50 PLUS PO) Take by mouth      OneTouch Delica Lancets 60E MISC Check blood sugars twice daily   Please substitute with appropriate alternative as covered by patient's insurance  Dx: E11 65 200 each 3    polyethylene glycol (MIRALAX) 17 g packet Take 17 g by mouth daily for 7 days 119 g 0     No current facility-administered medications for this visit  Social History:  Social History     Socioeconomic History    Marital status:      Spouse name: None    Number of children: None    Years of education: None    Highest education level: None   Occupational History    None   Tobacco Use    Smoking status: Never Smoker    Smokeless tobacco: Never Used   Vaping Use    Vaping Use: Never used   Substance and Sexual Activity    Alcohol use: Never    Drug use: Never    Sexual activity: None   Other Topics Concern    None   Social History Narrative    None     Social Determinants of Health     Financial Resource Strain: Not on file   Food Insecurity: Not on file   Transportation Needs: Not on file   Physical Activity: Not on file   Stress: Not on file   Social Connections: Not on file   Intimate Partner Violence: Not on file   Housing Stability: Not on file        Review of Systems   Constitutional: Negative for appetite change, chills and fever  HENT: Negative for sore throat  Eyes: Negative for visual disturbance  Respiratory: Negative for cough and shortness of breath  Cardiovascular: Negative for chest pain  Gastrointestinal: Negative for diarrhea, nausea and vomiting  Musculoskeletal: Negative for gait problem  Skin: Negative for rash and wound  Neurological: Negative for weakness and numbness  Hematological: Negative  Psychiatric/Behavioral: Negative for behavioral problems and confusion  All other systems reviewed and are negative  Objective:      Ht 5' 8" (1 727 m) Comment: verbal  Wt 107 kg (236 lb)   BMI 35 88 kg/m²          Physical Exam  Vitals reviewed  Constitutional:       General: She is not in acute distress  Appearance: She is not toxic-appearing or diaphoretic     HENT:      Head: Normocephalic and atraumatic  Eyes:      Extraocular Movements: Extraocular movements intact  Cardiovascular:      Rate and Rhythm: Normal rate and regular rhythm  Pulses: Normal pulses  no weak pulses          Dorsalis pedis pulses are 2+ on the right side and 2+ on the left side  Posterior tibial pulses are 2+ on the right side and 2+ on the left side  Pulmonary:      Effort: Pulmonary effort is normal  No respiratory distress  Musculoskeletal:         General: No swelling, tenderness or signs of injury  Cervical back: Normal range of motion and neck supple  Right lower leg: No edema  Left lower leg: No edema  Right foot: No Charcot foot or foot drop  Left foot: No Charcot foot or foot drop  Comments: 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, left worse than right  Feet:      Right foot:      Protective Sensation: 10 sites tested  10 sites sensed  Skin integrity: No ulcer, skin breakdown or erythema  Left foot:      Protective Sensation: 10 sites tested  10 sites sensed  Skin integrity: Callus present  No ulcer, skin breakdown or erythema  Skin:     General: Skin is warm  Capillary Refill: Capillary refill takes less than 2 seconds  Coloration: Skin is not cyanotic or mottled  Findings: No abscess, ecchymosis or erythema  Nails: There is no clubbing  Comments: Keratosis lateral left 4th toe  Diffuse keratosis and dry skin in her heels  Neurological:      General: No focal deficit present  Mental Status: She is alert and oriented to person, place, and time  Cranial Nerves: No cranial nerve deficit  Sensory: No sensory deficit  Motor: No weakness  Coordination: Coordination normal       Gait: Gait normal    Psychiatric:         Mood and Affect: Mood normal          Behavior: Behavior normal          Thought Content:  Thought content normal          Judgment: Judgment normal              Diabetic Foot Exam    Patient's shoes and socks removed  Right Foot/Ankle   Right Foot Inspection  Skin Exam: skin intact  No erythema, no maceration and no ulcer  Toe Exam: right toe deformity  No swelling, no tenderness and erythema    Sensory   Vibration: diminished  Proprioception: intact  Monofilament testing: intact    Vascular  Capillary refills: < 3 seconds  The right DP pulse is 2+  The right PT pulse is 2+  Left Foot/Ankle  Left Foot Inspection  Skin Exam: skin intact and callus  No erythema, no maceration and no ulcer  Toe Exam: left toe deformity  No swelling, no tenderness and no erythema  Sensory   Vibration: diminished  Proprioception: intact  Monofilament testing: intact    Vascular  Capillary refills: < 3 seconds  The left DP pulse is 2+  The left PT pulse is 2+       Assign Risk Category  Deformity present  No loss of protective sensation  No weak pulses  Risk: 0

## 2022-08-09 NOTE — LETTER
August 9, 2022     Daniel Weaver, [address] [facility]  [address] [id number]    Patient: Jadyn Chaudhari   YOB: 1960   Date of Visit: 8/9/2022       Dear Dr Amezquita Nest:    Thank you for referring Jadyn Chaudhari to me for evaluation  Below are my notes for this consultation  If you have questions, please do not hesitate to call me  I look forward to following your patient along with you  Sincerely,        Agustin Marr DPM        CC: No Recipients  [facility]  8/9/2022  1:44 PM  Sign when Signing Visit        PATIENT:  Jadyn Chaudhari    1960    ASSESSMENT:     1  Controlled type 2 diabetes mellitus without complication, without long-term current use of insulin ([facility] [address] )  Ambulatory Referral to Podiatry   2  Acquired deformity of left toe     3  Xerosis of skin  ammonium lactate (LAC-HYDRIN) 12 % cream   4  Acquired keratoderma           PLAN:  1  Patient was counseled on the condition and diagnosis  2   Educated disease prevention and risks related to diabetes  3   Educated proper daily foot care and exam   Instructed proper skin care / protection and footwear  Instructed to identify any signs of infection and related foot problem  4   The recent blood work was reviewed / discussed and the last HbA1c was 8 0  Discussed proper blood glucose control with diet and exercise  5  HPK trimmed and dispensed toe spacers  Discussed proper off-loading  6  Rx: Ammonium lactate cream for dry skin in her feet and heels  7  The patient will return in 6 months for diabetic foot exam       Imaging: I have personally reviewed pertinent films in PACS  Labs, pathology, and Other Studies: I have personally reviewed pertinent reports  Subjective:          HPI  The patient was referred to my office for diabetic foot evaluation  The patient has diabetes for about 2 years  The blood glucose is under control    The patient denied any history of diabetic foot ulcer, related foot infection, or amputation  No significant numbness or paresthesia  Denied weakness or significant functional deficit  The patient presents with sneakers  She complained of pain in left 4th toe  She also has dry skin / calluses in her heels  She denied any acute pedal problems  The following portions of the patient's history were reviewed and updated as appropriate: allergies, current medications, past family history, past medical history, past social history, past surgical history and problem list   All pertinent labs and images were reviewed  Past Medical History  Past Medical History:   Diagnosis Date    Bronchitis 7/3/2019    Diabetes mellitus ([address] )     Hyperlipidemia     Hypertension     Syncope 5/3/2022    Vasovagal syncope 6/8/2020       Past Surgical History  Past Surgical History:   Procedure Laterality Date    APPENDECTOMY      age 9     COLONOSCOPY      IR BIOPSY LIVER MASS  7/1/2020    TONSILLECTOMY      age 7         Allergies:  Patient has no known allergies  Medications:  Current Outpatient Medications   Medication Sig Dispense Refill    ammonium lactate (LAC-HYDRIN) 12 % cream Apply topically as needed for dry skin 385 g 3    atorvastatin (LIPITOR) 40 mg tablet TAKE 1 TABLET BY MOUTH EVERY DAY 30 tablet 0    Blood Glucose Monitoring Suppl (OneTouch Verio Reflect) w/Device KIT Check blood sugars twice daily  Please substitute with appropriate alternative as covered by patient's insurance  Dx: E11 65 1 kit 0    docusate sodium (COLACE) 100 mg capsule Take 1 capsule (100 mg total) by mouth every 12 (twelve) hours for 14 days 28 capsule 0    glucose blood (OneTouch Verio) test strip Check blood sugars twice daily  Please substitute with appropriate alternative as covered by patient's insurance   Dx: E11 65 200 each 3    Jardiance 25 MG TABS TAKE 1 TABLET BY MOUTH EVERY DAY IN THE MORNING 30 tablet 3    ketoconazole (NIZORAL) 2 % shampoo Apply 1 application topically once a week Patient to lather with shampoo ahead to thigh rinse after 5 minutes 3 days in row then weekly to prevent recurrence 120 mL 5    lisinopril-hydrochlorothiazide (PRINZIDE,ZESTORETIC) 10-12 5 MG per tablet Take 1 tablet by mouth daily 30 tablet 5    metFORMIN (GLUCOPHAGE) 500 mg tablet TAKE 2 TABLETS BY MOUTH TWICE A DAY WITH FOOD 120 tablet 5    Multiple Vitamins-Minerals (ONE-A-DAY 50 PLUS PO) Take by mouth      OneTouch Delica Lancets 37W MISC Check blood sugars twice daily  Please substitute with appropriate alternative as covered by patient's insurance  Dx: E11 65 200 each 3    polyethylene glycol (MIRALAX) 17 g packet Take 17 g by mouth daily for 7 days 119 g 0     No current facility-administered medications for this visit  Social History:  Social History     Socioeconomic History    Marital status:      Spouse name: None    Number of children: None    Years of education: None    Highest education level: None   Occupational History    None   Tobacco Use    Smoking status: Never Smoker    Smokeless tobacco: Never Used   Vaping Use    Vaping Use: Never used   Substance and Sexual Activity    Alcohol use: Never    Drug use: Never    Sexual activity: None   Other Topics Concern    None   Social History Narrative    None     Social Determinants of Health     Financial Resource Strain: Not on file   Food Insecurity: Not on file   Transportation Needs: Not on file   Physical Activity: Not on file   Stress: Not on file   Social Connections: Not on file   Intimate Partner Violence: Not on file   Housing Stability: Not on file        Review of Systems   Constitutional: Negative for appetite change, chills and fever  HENT: Negative for sore throat  Eyes: Negative for visual disturbance  Respiratory: Negative for cough and shortness of breath  Cardiovascular: Negative for chest pain  Gastrointestinal: Negative for diarrhea, nausea and vomiting  Musculoskeletal: Negative for gait problem  Skin: Negative for rash and wound  Neurological: Negative for weakness and numbness  Hematological: Negative  Psychiatric/Behavioral: Negative for behavioral problems and confusion  All other systems reviewed and are negative  Objective:      Ht 5' 8" (1 727 m) Comment: verbal  Wt 107 kg (236 lb)   BMI 35 88 kg/m²          Physical Exam  Vitals reviewed  Constitutional:       General: She is not in acute distress  Appearance: She is not toxic-appearing or diaphoretic  HENT:      Head: Normocephalic and atraumatic  Eyes:      Extraocular Movements: Extraocular movements intact  Cardiovascular:      Rate and Rhythm: Normal rate and regular rhythm  Pulses: Normal pulses  no weak pulses          Dorsalis pedis pulses are 2+ on the right side and 2+ on the left side  Posterior tibial pulses are 2+ on the right side and 2+ on the left side  Pulmonary:      Effort: Pulmonary effort is normal  No respiratory distress  Musculoskeletal:         General: No swelling, tenderness or signs of injury  Cervical back: Normal range of motion and neck supple  Right lower leg: No edema  Left lower leg: No edema  Right foot: No Charcot foot or foot drop  Left foot: No Charcot foot or foot drop  Comments: 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, left worse than right  Feet:      Right foot:      Protective Sensation: 10 sites tested  10 sites sensed  Skin integrity: No ulcer, skin breakdown or erythema  Left foot:      Protective Sensation: 10 sites tested  10 sites sensed  Skin integrity: Callus present  No ulcer, skin breakdown or erythema  Skin:     General: Skin is warm  Capillary Refill: Capillary refill takes less than 2 seconds  Coloration: Skin is not cyanotic or mottled  Findings: No abscess, ecchymosis or erythema  Nails: There is no clubbing        Comments: Keratosis lateral left 4th toe  Diffuse keratosis and dry skin in her heels  Neurological:      General: No focal deficit present  Mental Status: She is alert and oriented to person, place, and time  Cranial Nerves: No cranial nerve deficit  Sensory: No sensory deficit  Motor: No weakness  Coordination: Coordination normal       Gait: Gait normal    Psychiatric:         Mood and Affect: Mood normal          Behavior: Behavior normal          Thought Content: Thought content normal          Judgment: Judgment normal              Diabetic Foot Exam    Patient's shoes and socks removed  Right Foot/Ankle   Right Foot Inspection  Skin Exam: skin intact  No erythema, no maceration and no ulcer  Toe Exam: right toe deformity  No swelling, no tenderness and erythema    Sensory   Vibration: diminished  Proprioception: intact  Monofilament testing: intact    Vascular  Capillary refills: < 3 seconds  The right DP pulse is 2+  The right PT pulse is 2+  Left Foot/Ankle  Left Foot Inspection  Skin Exam: skin intact and callus  No erythema, no maceration and no ulcer  Toe Exam: left toe deformity  No swelling, no tenderness and no erythema  Sensory   Vibration: diminished  Proprioception: intact  Monofilament testing: intact    Vascular  Capillary refills: < 3 seconds  The left DP pulse is 2+  The left PT pulse is 2+       Assign Risk Category  Deformity present  No loss of protective sensation  No weak pulses  Risk: 0

## 2022-08-14 DIAGNOSIS — E11.9 CONTROLLED TYPE 2 DIABETES MELLITUS WITHOUT COMPLICATION, WITHOUT LONG-TERM CURRENT USE OF INSULIN (HCC): ICD-10-CM

## 2022-08-14 DIAGNOSIS — E78.5 DYSLIPIDEMIA: ICD-10-CM

## 2022-08-14 RX ORDER — ATORVASTATIN CALCIUM 40 MG/1
TABLET, FILM COATED ORAL
Qty: 30 TABLET | Refills: 0 | Status: SHIPPED | OUTPATIENT
Start: 2022-08-14 | End: 2022-09-14

## 2022-09-14 DIAGNOSIS — E78.5 DYSLIPIDEMIA: ICD-10-CM

## 2022-09-14 RX ORDER — ATORVASTATIN CALCIUM 40 MG/1
TABLET, FILM COATED ORAL
Qty: 30 TABLET | Refills: 0 | Status: SHIPPED | OUTPATIENT
Start: 2022-09-14 | End: 2022-10-19

## 2022-10-19 DIAGNOSIS — E78.5 DYSLIPIDEMIA: ICD-10-CM

## 2022-10-19 RX ORDER — ATORVASTATIN CALCIUM 40 MG/1
TABLET, FILM COATED ORAL
Qty: 30 TABLET | Refills: 0 | Status: SHIPPED | OUTPATIENT
Start: 2022-10-19

## 2022-11-04 DIAGNOSIS — I10 ESSENTIAL HYPERTENSION: ICD-10-CM

## 2022-11-04 RX ORDER — LISINOPRIL AND HYDROCHLOROTHIAZIDE 12.5; 1 MG/1; MG/1
TABLET ORAL
Qty: 30 TABLET | Refills: 5 | Status: SHIPPED | OUTPATIENT
Start: 2022-11-04

## 2022-11-17 DIAGNOSIS — E78.5 DYSLIPIDEMIA: ICD-10-CM

## 2022-11-17 RX ORDER — ATORVASTATIN CALCIUM 40 MG/1
TABLET, FILM COATED ORAL
Qty: 30 TABLET | Refills: 0 | Status: SHIPPED | OUTPATIENT
Start: 2022-11-17

## 2022-11-19 DIAGNOSIS — E11.9 CONTROLLED TYPE 2 DIABETES MELLITUS WITHOUT COMPLICATION, WITHOUT LONG-TERM CURRENT USE OF INSULIN (HCC): ICD-10-CM

## 2022-11-19 RX ORDER — EMPAGLIFLOZIN 25 MG/1
TABLET, FILM COATED ORAL
Qty: 30 TABLET | Refills: 3 | Status: SHIPPED | OUTPATIENT
Start: 2022-11-19

## 2022-11-30 ENCOUNTER — OFFICE VISIT (OUTPATIENT)
Dept: FAMILY MEDICINE CLINIC | Facility: CLINIC | Age: 62
End: 2022-11-30

## 2022-11-30 VITALS
TEMPERATURE: 97.9 F | DIASTOLIC BLOOD PRESSURE: 60 MMHG | BODY MASS INDEX: 35.52 KG/M2 | SYSTOLIC BLOOD PRESSURE: 100 MMHG | RESPIRATION RATE: 18 BRPM | WEIGHT: 234.4 LBS | OXYGEN SATURATION: 98 % | HEIGHT: 68 IN | HEART RATE: 68 BPM

## 2022-11-30 DIAGNOSIS — E11.9 CONTROLLED TYPE 2 DIABETES MELLITUS WITHOUT COMPLICATION, WITHOUT LONG-TERM CURRENT USE OF INSULIN (HCC): ICD-10-CM

## 2022-11-30 DIAGNOSIS — E78.5 DYSLIPIDEMIA: ICD-10-CM

## 2022-11-30 DIAGNOSIS — I10 BENIGN HYPERTENSION: ICD-10-CM

## 2022-11-30 DIAGNOSIS — E11.9 TYPE 2 DIABETES MELLITUS WITHOUT COMPLICATION, WITHOUT LONG-TERM CURRENT USE OF INSULIN (HCC): ICD-10-CM

## 2022-11-30 DIAGNOSIS — Z12.31 ENCOUNTER FOR SCREENING MAMMOGRAM FOR MALIGNANT NEOPLASM OF BREAST: ICD-10-CM

## 2022-11-30 DIAGNOSIS — Z00.00 ANNUAL PHYSICAL EXAM: Primary | ICD-10-CM

## 2022-11-30 DIAGNOSIS — Z23 NEED FOR VACCINATION: ICD-10-CM

## 2022-11-30 LAB — SL AMB POCT HEMOGLOBIN AIC: 7.3 (ref ?–6.5)

## 2022-11-30 NOTE — PATIENT INSTRUCTIONS

## 2022-11-30 NOTE — PROGRESS NOTES
[address] [facility] [address]    NAME: Terrance Edwards  AGE: 57 y o  SEX: female  : 1960     DATE: 2022     Assessment and Plan:     Problem List Items Addressed This Visit        Endocrine    Diabetes mellitus type 2, uncomplicated ([address] )    Controlled type 2 diabetes mellitus without complication, without long-term current use of insulin (HCC)    Relevant Orders    POCT hemoglobin A1c (Completed)    Hemoglobin A1C    Microalbumin / creatinine urine ratio    IRIS Diabetic eye exam       Cardiovascular and Mediastinum    Benign hypertension    Relevant Orders    CBC    Comprehensive metabolic panel       Other    Dyslipidemia    Relevant Orders    Lipid Panel with Direct LDL reflex    TSH, 3rd generation with Free T4 reflex    Annual physical exam - Primary     Mammogram ordered           Encounter for screening mammogram for malignant neoplasm of breast    Relevant Orders    Mammo screening bilateral w 3d & cad   Other Visit Diagnoses     Need for vaccination        Relevant Orders    influenza vaccine, quadrivalent, recombinant, PF, 0 5 mL, for patients 18 yr+ (FLUBLOK) (Completed)          Immunizations and preventive care screenings were discussed with patient today  Appropriate education was printed on patient's after visit summary  Counseling:  Dental Health: discussed importance of regular tooth brushing, flossing, and dental visits  Return in 6 months (on 2023) for Recheck  Chief Complaint:     Chief Complaint   Patient presents with   • Annual Exam     Patient here for annual wellness exam       History of Present Illness:     Adult Annual Physical   Patient here for a comprehensive physical exam  The patient reports no problems  Diet and Physical Activity  Diet/Nutrition: well balanced diet  Exercise: walking        Depression Screening  PHQ-2/9 Depression Screening         General Health  Sleep: sleeps poorly  Hearing: normal - bilateral   Vision: wears glasses  Dental: brushes teeth twice daily  /GYN Health  Patient is: postmenopausal  Last menstrual period: n/a  Contraceptive method: n/a  Review of Systems:     Review of Systems   Constitutional: Negative  HENT: Negative  Eyes: Negative  Respiratory: Negative  Cardiovascular: Negative  Gastrointestinal: Negative  Endocrine: Negative  Genitourinary: Negative  Musculoskeletal: Negative  Allergic/Immunologic: Negative  Neurological: Positive for headaches  Hematological: Negative  Psychiatric/Behavioral: Negative  Past Medical History:     Past Medical History:   Diagnosis Date   • Bronchitis 7/3/2019   • Diabetes mellitus ([facility] [address] )    • Hyperlipidemia    • Hypertension    • Syncope 5/3/2022   • Vasovagal syncope 6/8/2020      Past Surgical History:     Past Surgical History:   Procedure Laterality Date   • APPENDECTOMY      age 8    • COLONOSCOPY     • IR BIOPSY LIVER MASS  7/1/2020   • TONSILLECTOMY      age 7       Social History:     Social History     Socioeconomic History   • Marital status:       Spouse name: None   • Number of children: None   • Years of education: None   • Highest education level: None   Occupational History   • None   Tobacco Use   • Smoking status: Never   • Smokeless tobacco: Never   Vaping Use   • Vaping Use: Never used   Substance and Sexual Activity   • Alcohol use: Never   • Drug use: Never   • Sexual activity: Not Currently   Other Topics Concern   • None   Social History Narrative   • None     Social Determinants of Health     Financial Resource Strain: Not on file   Food Insecurity: Not on file   Transportation Needs: Not on file   Physical Activity: Not on file   Stress: Not on file   Social Connections: Not on file   Intimate Partner Violence: Not on file   Housing Stability: Not on file      Family History:     Family History   Problem Relation Age of Onset   • Diabetes Father    • Atrial fibrillation Sister       Current Medications:     Current Outpatient Medications   Medication Sig Dispense Refill   • atorvastatin (LIPITOR) 40 mg tablet TAKE 1 TABLET BY MOUTH EVERY DAY 30 tablet 0   • Blood Glucose Monitoring Suppl (OneTouch Verio Reflect) w/Device KIT Check blood sugars twice daily  Please substitute with appropriate alternative as covered by patient's insurance  Dx: E11 65 1 kit 0   • docusate sodium (COLACE) 100 mg capsule Take 1 capsule (100 mg total) by mouth every 12 (twelve) hours for 14 days 28 capsule 0   • glucose blood (OneTouch Verio) test strip Check blood sugars twice daily  Please substitute with appropriate alternative as covered by patient's insurance  Dx: E11 65 200 each 3   • Jardiance 25 MG TABS TAKE 1 TABLET BY MOUTH EVERY DAY IN THE MORNING 30 tablet 3   • lisinopril-hydrochlorothiazide (PRINZIDE,ZESTORETIC) 10-12 5 MG per tablet TAKE 1 TABLET BY MOUTH EVERY DAY 30 tablet 5   • metFORMIN (GLUCOPHAGE) 500 mg tablet TAKE 2 TABLETS BY MOUTH TWICE A DAY WITH FOOD 120 tablet 5   • Multiple Vitamins-Minerals (ONE-A-DAY 50 PLUS PO) Take by mouth     • OneTouch Delica Lancets 79W MISC Check blood sugars twice daily  Please substitute with appropriate alternative as covered by patient's insurance  Dx: E11 65 200 each 3     No current facility-administered medications for this visit  Allergies:     No Known Allergies   Physical Exam:     /60 (BP Location: Left arm, Patient Position: Sitting, Cuff Size: Large)   Pulse 68   Temp 97 9 °F (36 6 °C) (Tympanic)   Resp 18   Ht 5' 7 52" (1 715 m)   Wt 106 kg (234 lb 6 4 oz)   SpO2 98%   BMI 36 15 kg/m²     Physical Exam  Vitals and nursing note reviewed  Constitutional:       Appearance: She is well-developed and well-nourished  HENT:      Head: Normocephalic and atraumatic        Right Ear: External ear normal       Left Ear: External ear normal       Nose: Nose normal       Mouth/Throat: Mouth: Oropharynx is clear and moist    Eyes:      Extraocular Movements: EOM normal       Conjunctiva/sclera: Conjunctivae normal       Pupils: Pupils are equal, round, and reactive to light  Cardiovascular:      Rate and Rhythm: Normal rate and regular rhythm  Pulses: Intact distal pulses  Heart sounds: Normal heart sounds  Pulmonary:      Effort: Pulmonary effort is normal       Breath sounds: Normal breath sounds  Abdominal:      General: Bowel sounds are normal       Palpations: Abdomen is soft  Musculoskeletal:         General: Normal range of motion  Cervical back: Normal range of motion and neck supple  Skin:     General: Skin is warm and dry  Capillary Refill: Capillary refill takes less than 2 seconds  Neurological:      Mental Status: She is alert and oriented to person, place, and time  Psychiatric:         Mood and Affect: Mood and affect normal          Behavior: Behavior normal          Thought Content:  Thought content normal          Judgment: Judgment normal           Raman Tran DO  [address] [facility]

## 2022-12-14 DIAGNOSIS — E78.5 DYSLIPIDEMIA: ICD-10-CM

## 2022-12-14 RX ORDER — ATORVASTATIN CALCIUM 40 MG/1
TABLET, FILM COATED ORAL
Qty: 30 TABLET | Refills: 0 | Status: SHIPPED | OUTPATIENT
Start: 2022-12-14

## 2022-12-26 DIAGNOSIS — E11.9 TYPE 2 DIABETES MELLITUS WITHOUT COMPLICATION, WITHOUT LONG-TERM CURRENT USE OF INSULIN (HCC): ICD-10-CM

## 2022-12-27 RX ORDER — BLOOD SUGAR DIAGNOSTIC
STRIP MISCELLANEOUS
Qty: 100 STRIP | Refills: 7 | Status: SHIPPED | OUTPATIENT
Start: 2022-12-27

## 2023-01-13 DIAGNOSIS — E78.5 DYSLIPIDEMIA: ICD-10-CM

## 2023-01-13 RX ORDER — ATORVASTATIN CALCIUM 40 MG/1
TABLET, FILM COATED ORAL
Qty: 30 TABLET | Refills: 0 | Status: SHIPPED | OUTPATIENT
Start: 2023-01-13

## 2023-02-14 DIAGNOSIS — E78.5 DYSLIPIDEMIA: ICD-10-CM

## 2023-02-14 RX ORDER — ATORVASTATIN CALCIUM 40 MG/1
TABLET, FILM COATED ORAL
Qty: 30 TABLET | Refills: 0 | Status: SHIPPED | OUTPATIENT
Start: 2023-02-14

## 2023-03-06 DIAGNOSIS — E11.9 CONTROLLED TYPE 2 DIABETES MELLITUS WITHOUT COMPLICATION, WITHOUT LONG-TERM CURRENT USE OF INSULIN (HCC): ICD-10-CM

## 2023-03-14 ENCOUNTER — TELEPHONE (OUTPATIENT)
Dept: FAMILY MEDICINE CLINIC | Facility: CLINIC | Age: 63
End: 2023-03-14

## 2023-03-14 NOTE — TELEPHONE ENCOUNTER
Patient aware Dr Jennifer Escobar out of office this week and said she could wait for further advice till then  Patient said she went to her oncologist Dr Gauri Chong, and her heart rate was low   She was advised to f/u with PCP and possible to get a referral to see cardiologist     Please advise

## 2023-03-14 NOTE — TELEPHONE ENCOUNTER
Called Pt to offer an appt with another provider here while Dr Maryana Brown is out of the office  Pt declined and stated that it is not emergent at this time  Advised Pt to call if she needs any additional assistance or wants to schedule

## 2023-03-21 DIAGNOSIS — E78.5 DYSLIPIDEMIA: ICD-10-CM

## 2023-03-21 RX ORDER — ATORVASTATIN CALCIUM 40 MG/1
40 TABLET, FILM COATED ORAL DAILY
Qty: 30 TABLET | Refills: 5 | Status: SHIPPED | OUTPATIENT
Start: 2023-03-21

## 2023-03-29 ENCOUNTER — OFFICE VISIT (OUTPATIENT)
Dept: FAMILY MEDICINE CLINIC | Facility: CLINIC | Age: 63
End: 2023-03-29

## 2023-03-29 VITALS
SYSTOLIC BLOOD PRESSURE: 110 MMHG | HEART RATE: 70 BPM | WEIGHT: 234 LBS | DIASTOLIC BLOOD PRESSURE: 60 MMHG | TEMPERATURE: 97.3 F | HEIGHT: 68 IN | RESPIRATION RATE: 14 BRPM | OXYGEN SATURATION: 98 % | BODY MASS INDEX: 35.46 KG/M2

## 2023-03-29 DIAGNOSIS — R00.1 BRADYCARDIA: ICD-10-CM

## 2023-03-29 DIAGNOSIS — R07.89 CHEST PRESSURE: Primary | ICD-10-CM

## 2023-03-29 DIAGNOSIS — C7B.8 MESENTERY METASTASIS OF NEUROENDOCRINE TUMOR (HCC): ICD-10-CM

## 2023-03-29 DIAGNOSIS — E11.9 TYPE 2 DIABETES MELLITUS WITHOUT COMPLICATION, WITHOUT LONG-TERM CURRENT USE OF INSULIN (HCC): ICD-10-CM

## 2023-03-29 DIAGNOSIS — C7A.8 MESENTERY METASTASIS OF NEUROENDOCRINE TUMOR (HCC): ICD-10-CM

## 2023-03-29 NOTE — PROGRESS NOTES
Assessment/Plan:    1  Chest pressure  -     POCT ECG  -     Ambulatory Referral to Cardiology; Future    2  Bradycardia  Assessment & Plan:  Varying heart rates  Will refer to cardiology for eval and possible  zio patch      3  Type 2 diabetes mellitus without complication, without long-term current use of insulin ([facility] [address] )  Assessment & Plan:  Due for albs in may  Lab Results   Component Value Date    HGBA1C 7 3 (A) 11/30/2022       Orders:  -     Ambulatory Referral to Cardiology; Future    4  Mesentery metastasis of neuroendocrine tumor [facility])  Assessment & Plan:  Stable on recent pet scan        BMI Counseling: Body mass index is 36 09 kg/m²  The BMI is above normal  Nutrition recommendations include encouraging healthy choices of fruits and vegetables  Rationale for BMI follow-up plan is due to patient being overweight or obese  Depression Screening and Follow-up Plan: Patient was screened for depression during today's encounter  They screened negative with a PHQ-2 score of 0  There are no Patient Instructions on file for this visit  Return in about 3 months (around 6/29/2023) for Recheck  Subjective:      Patient ID: Severo Pride is a 57 y o  female  Chief Complaint   Patient presents with   • Slow Heart Rate     Patient here with heart rate sometimes low sometimes high feels pressure In chest        Here for low heart rate  Was at cancer center 52's   At home has been ranging from 32 once in am, some 52's to 79  Had pet scan in Feb- stable    Hypertension  This is a chronic problem  The current episode started more than 1 year ago  The problem is unchanged  The problem is controlled  Associated symptoms include chest pain  There are no associated agents to hypertension  Risk factors for coronary artery disease include diabetes mellitus and dyslipidemia  Past treatments include ACE inhibitors  The current treatment provides significant improvement  There are no compliance problems  The following portions of the patient's history were reviewed and updated as appropriate: allergies, current medications, past family history, past medical history, past social history, past surgical history and problem list     Review of Systems   Constitutional: Negative  HENT: Negative  Eyes: Negative  Respiratory: Negative  Cardiovascular: Positive for chest pain  Gastrointestinal: Negative  Endocrine: Negative  Genitourinary: Negative  Musculoskeletal: Negative  Skin: Negative  Allergic/Immunologic: Negative  Neurological: Negative  Hematological: Negative  Psychiatric/Behavioral: Negative  Current Outpatient Medications   Medication Sig Dispense Refill   • atorvastatin (LIPITOR) 40 mg tablet Take 1 tablet (40 mg total) by mouth daily 30 tablet 5   • Blood Glucose Monitoring Suppl (OneTouch Verio Reflect) w/Device KIT Check blood sugars twice daily  Please substitute with appropriate alternative as covered by patient's insurance  Dx: E11 65 1 kit 0   • glucose blood (OneTouch Verio) test strip CHECK BLOOD SUGARS TWICE DAILY 100 strip 7   • Jardiance 25 MG TABS TAKE 1 TABLET BY MOUTH EVERY DAY IN THE MORNING 30 tablet 3   • lisinopril-hydrochlorothiazide (PRINZIDE,ZESTORETIC) 10-12 5 MG per tablet TAKE 1 TABLET BY MOUTH EVERY DAY 30 tablet 5   • metFORMIN (GLUCOPHAGE) 500 mg tablet TAKE 2 TABLETS BY MOUTH TWICE A DAY WITH FOOD 120 tablet 5   • Multiple Vitamins-Minerals (ONE-A-DAY 50 PLUS PO) Take by mouth     • OneTouch Delica Lancets 78W MISC Check blood sugars twice daily  Please substitute with appropriate alternative as covered by patient's insurance  Dx: E11 65 200 each 3   • docusate sodium (COLACE) 100 mg capsule Take 1 capsule (100 mg total) by mouth every 12 (twelve) hours for 14 days (Patient not taking: Reported on 3/29/2023) 28 capsule 0     No current facility-administered medications for this visit         Objective:    /60 (BP Location: "Left arm, Patient Position: Sitting, Cuff Size: Large)   Pulse 70   Temp (!) 97 3 °F (36 3 °C) (Tympanic)   Resp 14   Ht 5' 7 52\" (1 715 m)   Wt 106 kg (234 lb)   SpO2 98%   BMI 36 09 kg/m²        Physical Exam  Vitals and nursing note reviewed  Constitutional:       Appearance: Normal appearance  She is well-developed  HENT:      Head: Normocephalic and atraumatic  Nose: Nose normal    Eyes:      General: Lids are normal       Extraocular Movements: Extraocular movements intact  Conjunctiva/sclera: Conjunctivae normal       Pupils: Pupils are equal, round, and reactive to light  Cardiovascular:      Rate and Rhythm: Normal rate and regular rhythm  Heart sounds: Normal heart sounds, S1 normal and S2 normal    Pulmonary:      Effort: Pulmonary effort is normal       Breath sounds: Normal breath sounds  Abdominal:      General: Abdomen is flat  Bowel sounds are normal       Palpations: Abdomen is soft  Musculoskeletal:         General: Normal range of motion  Cervical back: Normal range of motion and neck supple  Skin:     General: Skin is warm and dry  Neurological:      General: No focal deficit present  Mental Status: She is alert and oriented to person, place, and time  Deep Tendon Reflexes: Reflexes are normal and symmetric  Psychiatric:         Mood and Affect: Mood normal          Speech: Speech normal          Behavior: Behavior normal          Thought Content:  Thought content normal          Judgment: Judgment normal                 Ellis Joseph DO  "

## 2023-04-03 DIAGNOSIS — E11.9 CONTROLLED TYPE 2 DIABETES MELLITUS WITHOUT COMPLICATION, WITHOUT LONG-TERM CURRENT USE OF INSULIN (HCC): ICD-10-CM

## 2023-04-03 RX ORDER — EMPAGLIFLOZIN 25 MG/1
TABLET, FILM COATED ORAL
Qty: 30 TABLET | Refills: 3 | Status: SHIPPED | OUTPATIENT
Start: 2023-04-03

## 2023-05-02 DIAGNOSIS — I10 ESSENTIAL HYPERTENSION: ICD-10-CM

## 2023-05-02 RX ORDER — LISINOPRIL AND HYDROCHLOROTHIAZIDE 12.5; 1 MG/1; MG/1
TABLET ORAL
Qty: 30 TABLET | Refills: 5 | Status: SHIPPED | OUTPATIENT
Start: 2023-05-02

## 2023-05-21 NOTE — PROGRESS NOTES
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  Follows at [facility]  On octreotide (started 2021)    # Chest discomfort  Has CAD risk factors (DM, HTN, HLD)  Checked exercise nuclear stress test --> no ischemia  Talk to PCP about if GERD could be causing sx's and if worth trial of PPI    # Bradycardia  Heart rate in 50s at last oncology visit  Recent EKG was sinus at 62 bpm  Heart rate today 67 bpm  holter monitor -->  Sinus rhythm, avg HR 66  No pauses  # Monitoring for cardiac complications of neuroendocrine tumor  Can cause valve disease (urine had elevated 5-HIAA)  Echo recommended     # HTN  On lisinoril-HCTZ 10-12 5mg  Has been improving with weight loss  BP today well controlled  # HLD  On lipitor 40mg  Lipids 6/2022:     LDL 70  # DM2  On SGLT2i and metformin  Last a1c 7 3      Today's Plan Summary:  See above assessment/plan for full details of today's plan  Briefly,     Check Echo    CC - Dr Tala Adan          Reason For Visit / Chief Complaint:  f/u chest pressure, bradycardia, HTN, HLD    HPI:   Montez Lewis is a 70 y o   female with history as noted in the problem list and further detailed in the above assessment and plan  Initial:  April 2023  Referred by Dr Tala Adan (PCP) for a new patient visit for chest pressure and bradycardia  The patient has a history of metastatic neuroendocrine tumor  On octreotide  At last oncology visit at [facility] - pt was noted to have HR in 50's  She was told to check heart rates at home and when doing this she did note some low HR's  Told to have a cardiology consult  Has fatigue  Has chest pressure  Describes it as \"not pain\" but \"discomfort\"  Substernal, can feel it up into throat as well  Comes and goes    Most when laying in " bed   Not noted to have the chest discomfort with exertion  No arm pain  No diarphoresis  No SOB or MAST  Rare edema at end of day  No orthopnea or PND  No palpitations  No syncope  In terms of heart rates  Checking with pulse ox  Lowest 44   highest 88  Works as   History    1 adult daughter  Fam hx:  Mother - afib  Sister - VT  Interval:   At our initial visit --> rec stress test, holter  Nuclear stress test - 04/17/23  92% PMHR   7 METs  There were PVCs with exercise, improved with recovery  No EKG or imaging evidence of ischemia  Holter reviewed:    Sinus rhythm, average heart rate 66 bpm   ()  Very rare PAC's and PVCs  No pauses  Today, reports feeling overall stable  Still occasional discomfort in chest - worse when laying flat  No edema  No syncope  Occasional palpitations when laying down  Cardiac Imaging personally reviewed:  EKG 3-2-23  NSR at 62 bpm   Holter or event monitor Holter 4-17-23:    Sinus rhythm, average heart rate 66 bpm   ()  Very rare PAC's and PVCs  No pauses  Echo    LIDIA    Cardiac MRI    Stress testing Nuclear stress test - 04/17/23  92% PMHR   7 METs  There were PVCs with exercise, improved with recovery  No EKG or imaging evidence of ischemia         Coronary CTA or C    RHC    CPET              Patient Active Problem List    Diagnosis Date Noted   • Chest pressure 03/29/2023   • Bradycardia 03/29/2023   • Essential hypertension 05/03/2022   • Controlled type 2 diabetes mellitus without complication, without long-term current use of insulin ([address] ) 11/29/2021   • Atypical mole 11/29/2021   • Decreased hearing of both ears 11/12/2020   • Encounter for screening mammogram for malignant neoplasm of breast 11/12/2020   • Neuroendocrine tumor of liver 08/20/2020   • Malignant neuroendocrine tumor of lymph node ([address] ) 08/20/2020   • Mesentery metastasis of neuroendocrine tumor ([address] ) 08/20/2020   • Hepatic lesion 06/08/2020   • Endometrial thickening on ultrasound 06/08/2020   • Right upper lobe pulmonary nodule 06/08/2020   • Dysphagia 06/08/2020   • Annual physical exam 02/26/2019   • Encounter for screening mammogram for breast cancer 02/26/2019   • Screening for colon cancer 02/26/2019   • Snoring 02/26/2019   • Atypical nevi 09/19/2017   • Eye lesion 06/13/2016   • Polyp of sigmoid colon 06/13/2016   • Diabetes mellitus type 2, uncomplicated ([facility] ) 30/52/9890   • Benign hypertension 07/11/2014   • Class 2 obesity with body mass index (BMI) of 36 0 to 36 9 in adult 12/27/2013   • Dyslipidemia 07/10/2013       Past Medical History:   Diagnosis Date   • Bronchitis 7/3/2019   • Diabetes mellitus ([facility] )    • Hyperlipidemia    • Hypertension    • Syncope 5/3/2022   • Vasovagal syncope 6/8/2020       No Known Allergies    Current Outpatient Medications   Medication Instructions   • atorvastatin (LIPITOR) 40 mg, Oral, Daily   • Blood Glucose Monitoring Suppl (OneTouch Verio Reflect) w/Device KIT Check blood sugars twice daily  Please substitute with appropriate alternative as covered by patient's insurance  Dx: E11 65   • glucose blood (OneTouch Verio) test strip CHECK BLOOD SUGARS TWICE DAILY   • Jardiance 25 MG TABS TAKE 1 TABLET BY MOUTH EVERY DAY IN THE MORNING   • lisinopril-hydrochlorothiazide (PRINZIDE,ZESTORETIC) 10-12 5 MG per tablet TAKE 1 TABLET BY MOUTH EVERY DAY   • metFORMIN (GLUCOPHAGE) 500 mg tablet TAKE 2 TABLETS BY MOUTH TWICE A DAY WITH FOOD   • Multiple Vitamins-Minerals (ONE-A-DAY 50 PLUS PO) Oral   • OneTouch Delica Lancets 11V MISC Check blood sugars twice daily  Please substitute with appropriate alternative as covered by patient's insurance  Dx: E11 65       Social History     Socioeconomic History   • Marital status:       Spouse name: Not on file   • Number of children: Not on file   • Years of education: Not on file   • Highest education level: Not on file   Occupational History • Not on file   Tobacco Use   • Smoking status: Never   • Smokeless tobacco: Never   Vaping Use   • Vaping Use: Never used   Substance and Sexual Activity   • Alcohol use: Never   • Drug use: Never   • Sexual activity: Not Currently   Other Topics Concern   • Not on file   Social History Narrative   • Not on file     Social Determinants of Health     Financial Resource Strain: Not on file   Food Insecurity: Not on file   Transportation Needs: Not on file   Physical Activity: Not on file   Stress: Not on file   Social Connections: Not on file   Intimate Partner Violence: Not on file   Housing Stability: Not on file       Family History   Problem Relation Age of Onset   • Atrial fibrillation Mother    • Diabetes Father    • Heart disease Sister         ventricular tachycardia       Physical Exam:  Blood pressure 115/64, pulse 67, weight 108 kg (239 lb), SpO2 97 %  Body mass index is 37 43 kg/m²  Wt Readings from Last 3 Encounters:   05/23/23 108 kg (239 lb)   04/17/23 106 kg (234 lb)   04/11/23 106 kg (234 lb)     Physical Exam  Vitals reviewed  Constitutional:       General: She is not in acute distress  Appearance: She is not toxic-appearing  Neck:      Comments: No JVD  Cardiovascular:      Rate and Rhythm: Normal rate and regular rhythm  Heart sounds: No murmur heard  No friction rub  No gallop  Pulmonary:      Breath sounds: Normal breath sounds  No wheezing, rhonchi or rales  Abdominal:      General: There is no distension  Palpations: Abdomen is soft  Tenderness: There is no abdominal tenderness  There is no guarding  Musculoskeletal:      Right lower leg: No edema  Left lower leg: No edema  Neurological:      Mental Status: She is alert           Labs & Results:  Lab Results   Component Value Date    SODIUM 138 08/06/2022    K 3 7 08/06/2022     08/06/2022    CO2 28 08/06/2022    BUN 20 08/06/2022    CREATININE 0 77 08/06/2022    GLUC 132 08/06/2022    CALCIUM 9 2 08/06/2022     No results found for: NTBNP     Thank you for the opportunity to participate in the care of this patient      Chelsea Mcdonald MD, [facility]  Staff Cardiologist  Director of [facility]ed[facility] Scott

## 2023-05-23 ENCOUNTER — OFFICE VISIT (OUTPATIENT)
Dept: CARDIOLOGY CLINIC | Facility: CLINIC | Age: 63
End: 2023-05-23

## 2023-05-23 VITALS
BODY MASS INDEX: 37.43 KG/M2 | HEART RATE: 67 BPM | DIASTOLIC BLOOD PRESSURE: 64 MMHG | WEIGHT: 239 LBS | SYSTOLIC BLOOD PRESSURE: 115 MMHG | OXYGEN SATURATION: 97 %

## 2023-05-23 DIAGNOSIS — I10 PRIMARY HYPERTENSION: ICD-10-CM

## 2023-05-23 DIAGNOSIS — R07.89 CHEST PRESSURE: ICD-10-CM

## 2023-05-23 DIAGNOSIS — D3A.8 NEUROENDOCRINE TUMOR: Primary | ICD-10-CM

## 2023-05-23 DIAGNOSIS — E78.2 MIXED HYPERLIPIDEMIA: ICD-10-CM

## 2023-05-23 NOTE — Clinical Note
Hi Dr Ronn Loera -  I saw your patient for follow up  Stress test looked ok  No concerning findings on holter  I'm not sure what this chest / epigastric discomfort is    states it is worse in when laying in bed    ? consider eval for GERD (I would defer to you on this)  I also going to check an echo as neuroendocrine tumors can sometimes cause carcinoid heart disease  Erica Rodriguez -  F/u cardio-onc  34  1 year    Thanks, Akanksha Gaspar

## 2023-06-01 ENCOUNTER — APPOINTMENT (OUTPATIENT)
Dept: LAB | Facility: CLINIC | Age: 63
End: 2023-06-01
Payer: COMMERCIAL

## 2023-06-01 DIAGNOSIS — E11.9 CONTROLLED TYPE 2 DIABETES MELLITUS WITHOUT COMPLICATION, WITHOUT LONG-TERM CURRENT USE OF INSULIN (HCC): ICD-10-CM

## 2023-06-01 DIAGNOSIS — I10 BENIGN HYPERTENSION: ICD-10-CM

## 2023-06-01 DIAGNOSIS — E78.5 DYSLIPIDEMIA: ICD-10-CM

## 2023-06-01 LAB
ALBUMIN SERPL BCP-MCNC: 3.8 G/DL (ref 3.5–5)
ALP SERPL-CCNC: 66 U/L (ref 34–104)
ALT SERPL W P-5'-P-CCNC: 18 U/L (ref 7–52)
ANION GAP SERPL CALCULATED.3IONS-SCNC: 6 MMOL/L (ref 4–13)
AST SERPL W P-5'-P-CCNC: 17 U/L (ref 13–39)
BILIRUB SERPL-MCNC: 0.71 MG/DL (ref 0.2–1)
BUN SERPL-MCNC: 25 MG/DL (ref 5–25)
CALCIUM SERPL-MCNC: 9.2 MG/DL (ref 8.4–10.2)
CHLORIDE SERPL-SCNC: 103 MMOL/L (ref 96–108)
CHOLEST SERPL-MCNC: 251 MG/DL
CO2 SERPL-SCNC: 31 MMOL/L (ref 21–32)
CREAT SERPL-MCNC: 0.73 MG/DL (ref 0.6–1.3)
CREAT UR-MCNC: 52.5 MG/DL
ERYTHROCYTE [DISTWIDTH] IN BLOOD BY AUTOMATED COUNT: 15.9 % (ref 11.6–15.1)
EST. AVERAGE GLUCOSE BLD GHB EST-MCNC: 151 MG/DL
GFR SERPL CREATININE-BSD FRML MDRD: 86 ML/MIN/1.73SQ M
GLUCOSE P FAST SERPL-MCNC: 146 MG/DL (ref 65–99)
HBA1C MFR BLD: 6.9 %
HCT VFR BLD AUTO: 43.8 % (ref 34.8–46.1)
HDLC SERPL-MCNC: 56 MG/DL
HGB BLD-MCNC: 13.7 G/DL (ref 11.5–15.4)
LDLC SERPL CALC-MCNC: 160 MG/DL (ref 0–100)
MCH RBC QN AUTO: 28.4 PG (ref 26.8–34.3)
MCHC RBC AUTO-ENTMCNC: 31.3 G/DL (ref 31.4–37.4)
MCV RBC AUTO: 91 FL (ref 82–98)
MICROALBUMIN UR-MCNC: <5 MG/L (ref 0–20)
MICROALBUMIN/CREAT 24H UR: <10 MG/G CREATININE (ref 0–30)
PLATELET # BLD AUTO: 262 THOUSANDS/UL (ref 149–390)
PMV BLD AUTO: 9.8 FL (ref 8.9–12.7)
POTASSIUM SERPL-SCNC: 4.5 MMOL/L (ref 3.5–5.3)
PROT SERPL-MCNC: 7 G/DL (ref 6.4–8.4)
RBC # BLD AUTO: 4.82 MILLION/UL (ref 3.81–5.12)
SODIUM SERPL-SCNC: 140 MMOL/L (ref 135–147)
TRIGL SERPL-MCNC: 174 MG/DL
TSH SERPL DL<=0.05 MIU/L-ACNC: 1.8 UIU/ML (ref 0.45–4.5)
WBC # BLD AUTO: 6.46 THOUSAND/UL (ref 4.31–10.16)

## 2023-06-01 PROCEDURE — 82570 ASSAY OF URINE CREATININE: CPT

## 2023-06-01 PROCEDURE — 82043 UR ALBUMIN QUANTITATIVE: CPT

## 2023-06-01 PROCEDURE — 36415 COLL VENOUS BLD VENIPUNCTURE: CPT

## 2023-06-01 PROCEDURE — 84443 ASSAY THYROID STIM HORMONE: CPT

## 2023-06-01 PROCEDURE — 83036 HEMOGLOBIN GLYCOSYLATED A1C: CPT

## 2023-06-01 PROCEDURE — 80053 COMPREHEN METABOLIC PANEL: CPT

## 2023-06-01 PROCEDURE — 85027 COMPLETE CBC AUTOMATED: CPT

## 2023-06-01 PROCEDURE — 80061 LIPID PANEL: CPT

## 2023-06-08 ENCOUNTER — OFFICE VISIT (OUTPATIENT)
Dept: FAMILY MEDICINE CLINIC | Facility: CLINIC | Age: 63
End: 2023-06-08
Payer: COMMERCIAL

## 2023-06-08 VITALS
OXYGEN SATURATION: 97 % | HEIGHT: 69 IN | BODY MASS INDEX: 35.19 KG/M2 | SYSTOLIC BLOOD PRESSURE: 110 MMHG | WEIGHT: 237.6 LBS | RESPIRATION RATE: 16 BRPM | DIASTOLIC BLOOD PRESSURE: 72 MMHG | HEART RATE: 67 BPM

## 2023-06-08 DIAGNOSIS — Z13.820 ENCOUNTER FOR OSTEOPOROSIS SCREENING IN ASYMPTOMATIC POSTMENOPAUSAL PATIENT: ICD-10-CM

## 2023-06-08 DIAGNOSIS — E78.5 DYSLIPIDEMIA: ICD-10-CM

## 2023-06-08 DIAGNOSIS — H91.93 DECREASED HEARING OF BOTH EARS: ICD-10-CM

## 2023-06-08 DIAGNOSIS — C7A.8 MALIGNANT NEUROENDOCRINE TUMOR OF LYMPH NODE (HCC): ICD-10-CM

## 2023-06-08 DIAGNOSIS — I10 BENIGN HYPERTENSION: ICD-10-CM

## 2023-06-08 DIAGNOSIS — Z78.0 ENCOUNTER FOR OSTEOPOROSIS SCREENING IN ASYMPTOMATIC POSTMENOPAUSAL PATIENT: ICD-10-CM

## 2023-06-08 DIAGNOSIS — E11.9 CONTROLLED TYPE 2 DIABETES MELLITUS WITHOUT COMPLICATION, WITHOUT LONG-TERM CURRENT USE OF INSULIN (HCC): Primary | ICD-10-CM

## 2023-06-08 DIAGNOSIS — Z12.11 SCREENING FOR COLON CANCER: ICD-10-CM

## 2023-06-08 PROCEDURE — 92250 FUNDUS PHOTOGRAPHY W/I&R: CPT | Performed by: FAMILY MEDICINE

## 2023-06-08 PROCEDURE — 99214 OFFICE O/P EST MOD 30 MIN: CPT | Performed by: FAMILY MEDICINE

## 2023-06-08 RX ORDER — ATORVASTATIN CALCIUM 40 MG/1
40 TABLET, FILM COATED ORAL DAILY
Qty: 30 TABLET | Refills: 5 | Status: SHIPPED | OUTPATIENT
Start: 2023-06-08

## 2023-06-08 NOTE — ASSESSMENT & PLAN NOTE
Improved numbers  Walking  Iris today  Lab Results   Component Value Date    HGBA1C 6 9 (H) 06/01/2023

## 2023-06-08 NOTE — PROGRESS NOTES
Assessment/Plan:    1  Controlled type 2 diabetes mellitus without complication, without long-term current use of insulin ([facility] [address] )  Assessment & Plan:  Improved numbers  Walking  Iris today  Lab Results   Component Value Date    HGBA1C 6 9 (H) 06/01/2023       Orders:  -     Hemoglobin A1C; Future; Expected date: 10/06/2023  -     IRIS Diabetic eye exam    2  Dyslipidemia  Assessment & Plan:  Was not taking lipitor possibly, so increase in LDL likely due to missing pill      Orders:  -     atorvastatin (LIPITOR) 40 mg tablet; Take 1 tablet (40 mg total) by mouth daily  -     Lipid Panel with Direct LDL reflex; Future; Expected date: 10/06/2023    3  Benign hypertension  Assessment & Plan:  Stable on current meds      4  Malignant neuroendocrine tumor of lymph node [facility])  Assessment & Plan:  Seeing oncology in [place]      5  Encounter for osteoporosis screening in asymptomatic postmenopausal patient  -     DXA bone density spine hip and pelvis; Future; Expected date: 06/08/2023    6  Screening for colon cancer  -     Cologuard    7  Decreased hearing of both ears  -     Ambulatory Referral to Audiology; Future        Depression Screening and Follow-up Plan: Patient was screened for depression during today's encounter  They screened negative with a PHQ-2 score of 0  There are no Patient Instructions on file for this visit  Return in about 4 months (around 10/8/2023) for Recheck  Subjective:      Patient ID: Deborah Bell is a 70 y o  female  Chief Complaint   Patient presents with   • Follow-up     6 mo follow up-DM       Here for follow up  Summer plans- ? Not much  New grandbaby at home      The following portions of the patient's history were reviewed and updated as appropriate: allergies, current medications, past family history, past medical history, past social history, past surgical history and problem list     Review of Systems   Constitutional: Negative  HENT: Negative      Eyes: "Negative  Respiratory: Negative  Cardiovascular: Negative  Gastrointestinal: Negative  Endocrine: Negative  Genitourinary: Negative  Musculoskeletal: Negative  Skin: Negative  Allergic/Immunologic: Negative  Neurological: Negative  Hematological: Negative  Psychiatric/Behavioral: Negative  Current Outpatient Medications   Medication Sig Dispense Refill   • atorvastatin (LIPITOR) 40 mg tablet Take 1 tablet (40 mg total) by mouth daily 30 tablet 5   • Blood Glucose Monitoring Suppl (OneTouch Verio Reflect) w/Device KIT Check blood sugars twice daily  Please substitute with appropriate alternative as covered by patient's insurance  Dx: E11 65 1 kit 0   • glucose blood (OneTouch Verio) test strip CHECK BLOOD SUGARS TWICE DAILY 100 strip 7   • Jardiance 25 MG TABS TAKE 1 TABLET BY MOUTH EVERY DAY IN THE MORNING 30 tablet 3   • lisinopril-hydrochlorothiazide (PRINZIDE,ZESTORETIC) 10-12 5 MG per tablet TAKE 1 TABLET BY MOUTH EVERY DAY 30 tablet 5   • metFORMIN (GLUCOPHAGE) 500 mg tablet TAKE 2 TABLETS BY MOUTH TWICE A DAY WITH FOOD 120 tablet 5   • Multiple Vitamins-Minerals (ONE-A-DAY 50 PLUS PO) Take by mouth     • OneTouch Delica Lancets 45G MISC Check blood sugars twice daily  Please substitute with appropriate alternative as covered by patient's insurance  Dx: E11 65 200 each 3     No current facility-administered medications for this visit  Objective:    /72 (BP Location: Left arm, Patient Position: Sitting, Cuff Size: Large)   Pulse 67   Resp 16   Ht 5' 8 78\" (1 747 m) Comment: with shoes  Wt 108 kg (237 lb 9 6 oz)   SpO2 97%   BMI 35 31 kg/m²        Physical Exam  Vitals and nursing note reviewed  Constitutional:       Appearance: She is well-developed  HENT:      Head: Normocephalic and atraumatic  Nose: Nose normal    Eyes:      General: Lids are normal       Extraocular Movements: Extraocular movements intact        Conjunctiva/sclera: " Conjunctivae normal       Pupils: Pupils are equal, round, and reactive to light  Cardiovascular:      Rate and Rhythm: Normal rate and regular rhythm  Heart sounds: Normal heart sounds, S1 normal and S2 normal    Pulmonary:      Effort: Pulmonary effort is normal       Breath sounds: Normal breath sounds  Abdominal:      General: Abdomen is flat  Bowel sounds are normal       Palpations: Abdomen is soft  Musculoskeletal:         General: Normal range of motion  Cervical back: Normal range of motion and neck supple  Skin:     General: Skin is warm and dry  Neurological:      General: No focal deficit present  Mental Status: She is alert and oriented to person, place, and time  Deep Tendon Reflexes: Reflexes are normal and symmetric  Psychiatric:         Mood and Affect: Mood normal          Speech: Speech normal          Behavior: Behavior normal          Thought Content:  Thought content normal          Judgment: Judgment normal                 Steve Burris, DO

## 2023-06-09 LAB
LEFT EYE DIABETIC RETINOPATHY: NORMAL
LEFT EYE IMAGE QUALITY: NORMAL
LEFT EYE MACULAR EDEMA: NORMAL
LEFT EYE OTHER RETINOPATHY: NORMAL
RIGHT EYE DIABETIC RETINOPATHY: NORMAL
RIGHT EYE IMAGE QUALITY: NORMAL
RIGHT EYE MACULAR EDEMA: NORMAL
RIGHT EYE OTHER RETINOPATHY: NORMAL
SEVERITY (EYE EXAM): NORMAL

## 2023-06-19 ENCOUNTER — HOSPITAL ENCOUNTER (OUTPATIENT)
Dept: NON INVASIVE DIAGNOSTICS | Facility: CLINIC | Age: 63
Discharge: HOME | End: 2023-06-19
Payer: COMMERCIAL

## 2023-06-19 VITALS
WEIGHT: 237 LBS | BODY MASS INDEX: 35.1 KG/M2 | HEIGHT: 69 IN | DIASTOLIC BLOOD PRESSURE: 72 MMHG | SYSTOLIC BLOOD PRESSURE: 110 MMHG | HEART RATE: 55 BPM

## 2023-06-19 DIAGNOSIS — E78.2 MIXED HYPERLIPIDEMIA: ICD-10-CM

## 2023-06-19 DIAGNOSIS — D3A.8 NEUROENDOCRINE TUMOR: ICD-10-CM

## 2023-06-19 DIAGNOSIS — I10 PRIMARY HYPERTENSION: ICD-10-CM

## 2023-06-19 DIAGNOSIS — R07.89 CHEST PRESSURE: ICD-10-CM

## 2023-06-19 LAB
AORTIC ROOT: 3.4 CM
APICAL FOUR CHAMBER EJECTION FRACTION: 64 %
ASCENDING AORTA: 3.3 CM
AV LVOT MEAN GRADIENT: 1 MMHG
AV LVOT PEAK GRADIENT: 3 MMHG
AV PEAK GRADIENT: 6 MMHG
DOP CALC LVOT PEAK VEL VTI: 19.34 CM
DOP CALC LVOT PEAK VEL: 0.81 M/S
E WAVE DECELERATION TIME: 221 MS
FRACTIONAL SHORTENING: 38 % (ref 28–44)
INTERVENTRICULAR SEPTUM IN DIASTOLE (PARASTERNAL SHORT AXIS VIEW): 1 CM
INTERVENTRICULAR SEPTUM: 1 CM (ref 0.6–1.1)
LAAS-AP2: 13.6 CM2
LAAS-AP4: 16.5 CM2
LEFT ATRIUM AREA SYSTOLE SINGLE PLANE A4C: 16.1 CM2
LEFT ATRIUM SIZE: 3.4 CM
LEFT INTERNAL DIMENSION IN SYSTOLE: 3.1 CM (ref 2.1–4)
LEFT VENTRICULAR INTERNAL DIMENSION IN DIASTOLE: 5 CM (ref 3.5–6)
LEFT VENTRICULAR POSTERIOR WALL IN END DIASTOLE: 1 CM
LEFT VENTRICULAR STROKE VOLUME: 78 ML
LVSV (TEICH): 78 ML
MV E'TISSUE VEL-SEP: 9 CM/S
MV PEAK A VEL: 0.6 M/S
MV PEAK E VEL: 77 CM/S
MV STENOSIS PRESSURE HALF TIME: 64 MS
MV VALVE AREA P 1/2 METHOD: 3.44 CM2
RIGHT ATRIAL 2D VOLUME: 39 ML
RIGHT ATRIUM AREA SYSTOLE A4C: 14.9 CM2
RIGHT VENTRICLE ID DIMENSION: 3.3 CM
SL CV LEFT ATRIUM LENGTH A2C: 4.5 CM
SL CV LV EF: 60
SL CV PED ECHO LEFT VENTRICLE DIASTOLIC VOLUME (MOD BIPLANE) 2D: 117 ML
SL CV PED ECHO LEFT VENTRICLE SYSTOLIC VOLUME (MOD BIPLANE) 2D: 39 ML
TR MAX PG: 32 MMHG
TR PEAK VELOCITY: 2.8 M/S
TRICUSPID ANNULAR PLANE SYSTOLIC EXCURSION: 2.5 CM
TRICUSPID VALVE PEAK REGURGITATION VELOCITY: 2.81 M/S

## 2023-06-19 PROCEDURE — 93306 TTE W/DOPPLER COMPLETE: CPT | Performed by: INTERNAL MEDICINE

## 2023-06-19 PROCEDURE — 93306 TTE W/DOPPLER COMPLETE: CPT

## 2023-06-19 NOTE — RESULT ENCOUNTER NOTE
Echo - 06/19/23   EF 60%  Normal TV structure, there is mild TR  Lorena Nassar clinical:  Please call the patient to let them know I reviewed the echo and there are no major concerns on this study  This is good news and I will see her next year!     Thanks,  Hilario Caal

## 2023-08-03 DIAGNOSIS — E11.9 CONTROLLED TYPE 2 DIABETES MELLITUS WITHOUT COMPLICATION, WITHOUT LONG-TERM CURRENT USE OF INSULIN (HCC): ICD-10-CM

## 2023-08-03 RX ORDER — EMPAGLIFLOZIN 25 MG/1
TABLET, FILM COATED ORAL
Qty: 30 TABLET | Refills: 3 | Status: SHIPPED | OUTPATIENT
Start: 2023-08-03

## 2023-08-08 ENCOUNTER — OFFICE VISIT (OUTPATIENT)
Dept: URGENT CARE | Facility: CLINIC | Age: 63
End: 2023-08-08
Payer: MEDICARE

## 2023-08-08 VITALS
HEIGHT: 69 IN | WEIGHT: 228 LBS | HEART RATE: 85 BPM | RESPIRATION RATE: 16 BRPM | DIASTOLIC BLOOD PRESSURE: 67 MMHG | BODY MASS INDEX: 33.77 KG/M2 | TEMPERATURE: 97.8 F | OXYGEN SATURATION: 93 % | SYSTOLIC BLOOD PRESSURE: 129 MMHG

## 2023-08-08 DIAGNOSIS — J06.9 VIRAL UPPER RESPIRATORY TRACT INFECTION: Primary | ICD-10-CM

## 2023-08-08 DIAGNOSIS — R06.2 WHEEZING: ICD-10-CM

## 2023-08-08 PROCEDURE — 99213 OFFICE O/P EST LOW 20 MIN: CPT | Performed by: NURSE PRACTITIONER

## 2023-08-08 PROCEDURE — G0463 HOSPITAL OUTPT CLINIC VISIT: HCPCS | Performed by: NURSE PRACTITIONER

## 2023-08-08 RX ORDER — GUAIFENESIN AND CODEINE PHOSPHATE 100; 10 MG/5ML; MG/5ML
SOLUTION ORAL
Qty: 180 ML | Refills: 0 | Status: SHIPPED | OUTPATIENT
Start: 2023-08-08

## 2023-08-08 RX ORDER — ALBUTEROL SULFATE 90 UG/1
2 AEROSOL, METERED RESPIRATORY (INHALATION) EVERY 6 HOURS PRN
Qty: 18 G | Refills: 0 | Status: SHIPPED | OUTPATIENT
Start: 2023-08-08

## 2023-08-08 NOTE — PROGRESS NOTES
[facility]        NAME: Vicki Wade is a 70 y.o. female  : 1960    MRN: [id number]  DATE: 2023  TIME: 2:25 PM    Assessment and Plan   Viral upper respiratory tract infection [J06.9]  1. Viral upper respiratory tract infection  guaifenesin-codeine (GUAIFENESIN AC) 100-10 MG/5ML liquid      2. Wheezing  albuterol (Ventolin HFA) 90 mcg/act inhaler            Patient Instructions     --Rest, drink plenty of fluids  --For cough, you can take an OTC expectorant such as plain Robitussion or Mucinex (active ingredient guaifenesin). A spoonful of honey at bedtime may also be helpful, as may a prescription cough medicine. Also recommended is the use of a cool mist humidifier (with or without Vicks) in the bedroom at night. --Inhaler as needed for chest tightness/wheezing  --For nasal/sinus congestion, helpful measures include steam, warm compresses, an OTC saline nasal spray or Neti pot, or an OTC decongestant (such as Sudafed). The decongestant should be avoided, however, if you are under 10years of age, or have a history of high blood pressure or heart disease. In addition, an OTC nasal steroid (Flonase, Nasocort) or nasal decongestant (Afrin, Rick-synephrine) may be taken. The nasal steroid should be used at bedtime, after the saline nasal spray. The nasal decongestant should not be taken more than 3 days consecutively in order to prevent rebound congestion. --For nasal drainage, postnasal drip, sneezing and itching, an OTC antihistamine (Allegra, Benadryl, etc) can be taken. --For sore throat, you can take OTC lozenges, use warm gargles (salt water or apple cider vinegar and honey), herbal teas, or an OTC throat spray (Chloraseptic). --You can take Tylenol or Motrin/Advil as needed for fever, headache, body aches. Motrin/Advil should be avoided, however, if you have a history of heart disease, bleeding ulcers, or if you take blood thinners.    --You should contact your primary care provider and/or go to the ER if your symptoms are not improved or get worse over the next 7 days. This includes new onset fever, localized ear pain, sinus pain, as well as worsening cough, chest pain, shortness of breath, or significant weakness/fatigue. Chief Complaint     Chief Complaint   Patient presents with   • Cough     Cough, congestion - started Friday. OTC Robitussin          History of Present Illness       Here with complaints of productive cough, sore throat x 4 days. Did not get a look at color of sputum. No nasal congestion, PND, rhinorrhea, headache, body aches, fever. Some chest tightness/wheezing at times. No dyspnea, CP. No GI symptoms. Taking OTC Robitussin. No COVID concerns or known sick contacts. Denies history of asthma. Review of Systems   Review of Systems   Constitutional: Negative for fever. HENT: Positive for sore throat. Negative for postnasal drip and rhinorrhea. Respiratory: Positive for cough, chest tightness and wheezing. Negative for shortness of breath. Cardiovascular: Negative for chest pain. Musculoskeletal: Negative for myalgias. Neurological: Negative for headaches. Current Medications       Current Outpatient Medications:   •  albuterol (Ventolin HFA) 90 mcg/act inhaler, Inhale 2 puffs every 6 (six) hours as needed for wheezing, Disp: 18 g, Rfl: 0  •  atorvastatin (LIPITOR) 40 mg tablet, Take 1 tablet (40 mg total) by mouth daily, Disp: 30 tablet, Rfl: 5  •  Blood Glucose Monitoring Suppl (OneTouch Verio Reflect) w/Device KIT, Check blood sugars twice daily. Please substitute with appropriate alternative as covered by patient's insurance.  Dx: E11.65, Disp: 1 kit, Rfl: 0  •  glucose blood (OneTouch Verio) test strip, CHECK BLOOD SUGARS TWICE DAILY, Disp: 100 strip, Rfl: 7  •  guaifenesin-codeine (GUAIFENESIN AC) 100-10 MG/5ML liquid, TAKE 1-2 TSP BY  MOUTH EVERY 4-6 HOURS AS NEEDED FOR COUGH, Disp: 180 mL, Rfl: 0  •  Jardiance 25 MG TABS, TAKE 1 TABLET BY MOUTH EVERY DAY IN THE MORNING, Disp: 30 tablet, Rfl: 3  •  lisinopril-hydrochlorothiazide (PRINZIDE,ZESTORETIC) 10-12.5 MG per tablet, TAKE 1 TABLET BY MOUTH EVERY DAY, Disp: 30 tablet, Rfl: 5  •  metFORMIN (GLUCOPHAGE) 500 mg tablet, TAKE 2 TABLETS BY MOUTH TWICE A DAY WITH FOOD, Disp: 120 tablet, Rfl: 5  •  Multiple Vitamins-Minerals (ONE-A-DAY 50 PLUS PO), Take by mouth, Disp: , Rfl:   •  OneTouch Delica Lancets 31K MISC, Check blood sugars twice daily. Please substitute with appropriate alternative as covered by patient's insurance. Dx: E11.65, Disp: 200 each, Rfl: 3    Current Allergies     Allergies as of 08/08/2023   • (No Known Allergies)            The following portions of the patient's history were reviewed and updated as appropriate: allergies, current medications, past family history, past medical history, past social history, past surgical history and problem list.     Past Medical History:   Diagnosis Date   • Bronchitis 7/3/2019   • Diabetes mellitus ([address])    • Hyperlipidemia    • Hypertension    • Syncope 5/3/2022   • Vasovagal syncope 6/8/2020       Past Surgical History:   Procedure Laterality Date   • APPENDECTOMY      age 8    • COLONOSCOPY     • IR BIOPSY LIVER MASS  7/1/2020   • TONSILLECTOMY      age 7        Family History   Problem Relation Age of Onset   • Atrial fibrillation Mother    • Diabetes Father    • Heart disease Sister         ventricular tachycardia         Medications have been verified. Objective   /67   Pulse 85   Temp 97.8 °F (36.6 °C)   Resp 16   Ht 5' 8.78" (1.747 m)   Wt 103 kg (228 lb)   SpO2 93%   BMI 33.89 kg/m²   No LMP recorded. Patient is postmenopausal.       Physical Exam     Physical Exam  Constitutional:       General: She is not in acute distress. Appearance: Normal appearance. She is well-developed. She is not ill-appearing, toxic-appearing or diaphoretic. HENT:      Head: Normocephalic. Right Ear: Tympanic membrane, ear canal and external ear normal.      Left Ear: Tympanic membrane, ear canal and external ear normal.      Nose: No congestion or rhinorrhea. Mouth/Throat:      Pharynx: No oropharyngeal exudate or posterior oropharyngeal erythema. Eyes:      General:         Right eye: No discharge. Left eye: No discharge. Cardiovascular:      Rate and Rhythm: Normal rate and regular rhythm. Heart sounds: Normal heart sounds. No murmur heard. Pulmonary:      Effort: Pulmonary effort is normal. No respiratory distress. Breath sounds: Normal breath sounds. No stridor. No wheezing, rhonchi or rales. Comments: Breathing non-labored. RR=16. No cough noted at this time. Chest:      Chest wall: No tenderness. Abdominal:      Tenderness: There is no abdominal tenderness. Musculoskeletal:      Cervical back: Neck supple. Lymphadenopathy:      Cervical: No cervical adenopathy. Skin:     General: Skin is warm and dry. Neurological:      Mental Status: She is alert and oriented to person, place, and time. Deep Tendon Reflexes: Reflexes are normal and symmetric.    Psychiatric:         Mood and Affect: Mood normal.

## 2023-08-08 NOTE — PATIENT INSTRUCTIONS
--Rest, drink plenty of fluids  --For cough, you can take an OTC expectorant such as plain Robitussion or Mucinex (active ingredient guaifenesin). A spoonful of honey at bedtime may also be helpful, as may a prescription cough medicine. Also recommended is the use of a cool mist humidifier (with or without Vicks) in the bedroom at night. --Inhaler as needed for chest tightness/wheezing  --For nasal/sinus congestion, helpful measures include steam, warm compresses, an OTC saline nasal spray or Neti pot, or an OTC decongestant (such as Sudafed). The decongestant should be avoided, however, if you are under 10years of age, or have a history of high blood pressure or heart disease. In addition, an OTC nasal steroid (Flonase, Nasocort) or nasal decongestant (Afrin, Rick-synephrine) may be taken. The nasal steroid should be used at bedtime, after the saline nasal spray. The nasal decongestant should not be taken more than 3 days consecutively in order to prevent rebound congestion. --For nasal drainage, postnasal drip, sneezing and itching, an OTC antihistamine (Allegra, Benadryl, etc) can be taken. --For sore throat, you can take OTC lozenges, use warm gargles (salt water or apple cider vinegar and honey), herbal teas, or an OTC throat spray (Chloraseptic). --You can take Tylenol or Motrin/Advil as needed for fever, headache, body aches. Motrin/Advil should be avoided, however, if you have a history of heart disease, bleeding ulcers, or if you take blood thinners. --You should contact your primary care provider and/or go to the ER if your symptoms are not improved or get worse over the next 7 days. This includes new onset fever, localized ear pain, sinus pain, as well as worsening cough, chest pain, shortness of breath, or significant weakness/fatigue.

## 2023-08-14 ENCOUNTER — OFFICE VISIT (OUTPATIENT)
Dept: AUDIOLOGY | Age: 63
End: 2023-08-14
Payer: MEDICARE

## 2023-08-14 DIAGNOSIS — E78.5 DYSLIPIDEMIA: ICD-10-CM

## 2023-08-14 DIAGNOSIS — H90.3 SENSORY HEARING LOSS, BILATERAL: Primary | ICD-10-CM

## 2023-08-14 PROCEDURE — 92557 COMPREHENSIVE HEARING TEST: CPT | Performed by: AUDIOLOGIST

## 2023-08-14 PROCEDURE — 92567 TYMPANOMETRY: CPT | Performed by: AUDIOLOGIST

## 2023-08-14 RX ORDER — ATORVASTATIN CALCIUM 40 MG/1
40 TABLET, FILM COATED ORAL DAILY
Qty: 30 TABLET | Refills: 5 | Status: SHIPPED | OUTPATIENT
Start: 2023-08-14 | End: 2023-08-16 | Stop reason: SDUPTHER

## 2023-08-14 NOTE — PROGRESS NOTES
HEARING EVALUATION    Name:  Vicki Wade  :  1960  Age:  70 y.o. Date of Evaluation: 23     History: Tinnitus and Difficulty Understanding  Reason for visit: Vicki Wade is being seen today at the request of Dr. Farzad Menezes for an evaluation of hearing. 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 Patient denies otalgia, otorrhea, dizziness, fullness, and tinnitus. Patient denies a family history of hearing loss and noise exposure. EVALUATION:    Otoscopic Evaluation:   Right Ear: Clear and healthy ear canal and tympanic membrane   Left Ear: Clear and healthy ear canal and tympanic membrane    Tympanometry:   Right: Type A - normal middle ear pressure and compliance   Left: Type A - normal middle ear pressure and compliance    Audiogram Results:  Pure tone testing revealed a normal sloping to moderate sensorineural hearing loss bilaterally. SRT and PTA are in agreement indicating good test reliability. Word recognition scores were excellent bilaterally. *see attached audiogram      RECOMMENDATIONS:  Annual hearing eval and Copy to Patient/Caregiver    PATIENT EDUCATION:   Discussed results and recommendations with patient. Questions were addressed and the patient was encouraged to contact our department should concerns arise.       Raquel Schultz, CCC-A  Clinical Audiologist

## 2023-08-16 ENCOUNTER — NURSE TRIAGE (OUTPATIENT)
Dept: FAMILY MEDICINE CLINIC | Facility: CLINIC | Age: 63
End: 2023-08-16

## 2023-08-16 DIAGNOSIS — E78.5 DYSLIPIDEMIA: ICD-10-CM

## 2023-08-16 RX ORDER — ATORVASTATIN CALCIUM 40 MG/1
40 TABLET, FILM COATED ORAL DAILY
Qty: 30 TABLET | Refills: 5 | OUTPATIENT
Start: 2023-08-16

## 2023-08-16 RX ORDER — ATORVASTATIN CALCIUM 40 MG/1
40 TABLET, FILM COATED ORAL DAILY
Qty: 30 TABLET | Refills: 5 | Status: SHIPPED | OUTPATIENT
Start: 2023-08-16

## 2023-08-16 NOTE — TELEPHONE ENCOUNTER
Regarding: Medication refill resend prescription  ----- Message from Salina Vazquez sent at 8/16/2023  7:02 PM EDT -----  My prescription for atorvastatin (LIPITOR) 40 mg tablet is not at [facility] per the pharmacist. He states there is no record of the prescription. He gave me 3 pills to hold me off but I need the script sent over again.

## 2023-08-16 NOTE — TELEPHONE ENCOUNTER
Reason for Disposition  • [1] Prescription prescribed recently is not at pharmacy AND [2] triager has access to patient's EMR AND [3] prescription is recorded in the EMR    Answer Assessment - Initial Assessment Questions  1. NAME of MEDICATION: "What medicine are you calling about?"      Atorvastatin 40 mg PO daily    2. QUESTION: "What is your question?" (e.g., medication refill, side effect)      "The pharmacy did not receive the prescription and I am almost out"    3. PRESCRIBING HCP: "Who prescribed it?" Reason: if prescribed by specialist, call should be referred to that group.      Dr. Obdulio Morgan    Protocols used: MEDICATION QUESTION CALL-ADULT-

## 2023-08-17 ENCOUNTER — OFFICE VISIT (OUTPATIENT)
Dept: FAMILY MEDICINE CLINIC | Facility: CLINIC | Age: 63
End: 2023-08-17
Payer: MEDICARE

## 2023-08-17 ENCOUNTER — HOSPITAL ENCOUNTER (OUTPATIENT)
Dept: RADIOLOGY | Facility: HOSPITAL | Age: 63
Discharge: HOME | End: 2023-08-17
Payer: MEDICARE

## 2023-08-17 VITALS
WEIGHT: 234.4 LBS | DIASTOLIC BLOOD PRESSURE: 70 MMHG | SYSTOLIC BLOOD PRESSURE: 120 MMHG | HEART RATE: 73 BPM | OXYGEN SATURATION: 94 % | HEIGHT: 69 IN | TEMPERATURE: 98.1 F | BODY MASS INDEX: 34.72 KG/M2 | RESPIRATION RATE: 16 BRPM

## 2023-08-17 DIAGNOSIS — J40 BRONCHITIS: ICD-10-CM

## 2023-08-17 DIAGNOSIS — J40 BRONCHITIS: Primary | ICD-10-CM

## 2023-08-17 DIAGNOSIS — L98.9 SKIN LESION OF FACE: ICD-10-CM

## 2023-08-17 PROCEDURE — 99214 OFFICE O/P EST MOD 30 MIN: CPT | Performed by: NURSE PRACTITIONER

## 2023-08-17 PROCEDURE — 71046 X-RAY EXAM CHEST 2 VIEWS: CPT

## 2023-08-17 RX ORDER — BENZONATATE 100 MG/1
100 CAPSULE ORAL 3 TIMES DAILY PRN
Qty: 20 CAPSULE | Refills: 0 | Status: SHIPPED | OUTPATIENT
Start: 2023-08-17

## 2023-08-17 RX ORDER — AZITHROMYCIN 250 MG/1
TABLET, FILM COATED ORAL
Qty: 6 TABLET | Refills: 0 | Status: SHIPPED | OUTPATIENT
Start: 2023-08-17 | End: 2023-08-22

## 2023-08-17 NOTE — ASSESSMENT & PLAN NOTE
Non-healing lesion left eye, has atypical appearance with scabbing and surrounding erythema. Referral to derm for evaluation.

## 2023-08-17 NOTE — ASSESSMENT & PLAN NOTE
Moist cough since 8/6, mid and upper lungs are course, slight wheeze left mid-upper lobe. Start azithromycin, check chest x-ray to rule out pneumonia. Prn benzonatate for evenings, recommend increased hydration, restart robitussin prn.   Pt instructed to call for reevaluation if sx worsen or persist.

## 2023-08-17 NOTE — PROGRESS NOTES
Name: Danie Rendon      : 1960      MRN: [id number]  Encounter Provider: KERLINE Hopson  Encounter Date: 2023   Encounter department: [facility]     1. Bronchitis  Assessment & Plan:  Moist cough since , mid and upper lungs are course, slight wheeze left mid-upper lobe. Start azithromycin, check chest x-ray to rule out pneumonia. Prn benzonatate for evenings, recommend increased hydration, restart robitussin prn. Pt instructed to call for reevaluation if sx worsen or persist.      Orders:  -     azithromycin (Zithromax) 250 mg tablet; Take 2 tablets (500 mg total) by mouth daily for 1 day, THEN 1 tablet (250 mg total) daily for 4 days. -     XR chest pa & lateral; Future; Expected date: 2023  -     benzonatate (TESSALON PERLES) 100 mg capsule; Take 1 capsule (100 mg total) by mouth 3 (three) times a day as needed for cough    2. Skin lesion of face  Assessment & Plan:  Non-healing lesion left eye, has atypical appearance with scabbing and surrounding erythema. Referral to derm for evaluation. Orders:  -     Ambulatory Referral to Dermatology; Future         Subjective      Pt is a 70 y.o. y/o female who is seen today for evaluation of cough. Past medical history of hepatic lesion, neuroendocrine tumor of liver, DM, pulmonary nodule, HTN, malignant neuroendocrine tumor of lymph node, obesity, dyslipidemia. She started with a cough approx  and she was evaluated at [facility] urgent care on . They diagnosed her with a respiratory virus and prescribed cough syrup with codeine. She says her throat is sore from coughing and she has had the sniffles for the last few days and is fatigued because the cough is interrupting her sleeping. She denies fever, chills, sob, appetite, n/v/d. She is not taking anything otc, she had been using robitussin. Review of Systems   Constitutional: Positive for fatigue.  Negative for appetite change, chills and fever. HENT: Positive for rhinorrhea and sore throat. Negative for congestion, ear pain and postnasal drip. Respiratory: Positive for cough. Negative for chest tightness, shortness of breath and wheezing. Cardiovascular: Negative for chest pain. Gastrointestinal: Negative for diarrhea, nausea and vomiting. Neurological: Negative for dizziness and headaches. Psychiatric/Behavioral: Positive for sleep disturbance. Current Outpatient Medications on File Prior to Visit   Medication Sig   • atorvastatin (LIPITOR) 40 mg tablet Take 1 tablet (40 mg total) by mouth daily   • Blood Glucose Monitoring Suppl (OneTouch Verio Reflect) w/Device KIT Check blood sugars twice daily. Please substitute with appropriate alternative as covered by patient's insurance. Dx: E11.65   • glucose blood (OneTouch Verio) test strip CHECK BLOOD SUGARS TWICE DAILY   • guaifenesin-codeine (GUAIFENESIN AC) 100-10 MG/5ML liquid TAKE 1-2 TSP BY  MOUTH EVERY 4-6 HOURS AS NEEDED FOR COUGH   • Jardiance 25 MG TABS TAKE 1 TABLET BY MOUTH EVERY DAY IN THE MORNING   • lisinopril-hydrochlorothiazide (PRINZIDE,ZESTORETIC) 10-12.5 MG per tablet TAKE 1 TABLET BY MOUTH EVERY DAY   • metFORMIN (GLUCOPHAGE) 500 mg tablet TAKE 2 TABLETS BY MOUTH TWICE A DAY WITH FOOD   • Multiple Vitamins-Minerals (ONE-A-DAY 50 PLUS PO) Take by mouth   • OneTouch Delica Lancets 22U MISC Check blood sugars twice daily. Please substitute with appropriate alternative as covered by patient's insurance. Dx: E11.65   • albuterol (Ventolin HFA) 90 mcg/act inhaler Inhale 2 puffs every 6 (six) hours as needed for wheezing (Patient not taking: Reported on 8/17/2023)       Objective     /70 (BP Location: Left arm, Patient Position: Sitting, Cuff Size: Large)   Pulse 73   Temp 98.1 °F (36.7 °C) (Tympanic)   Resp 16   Ht 5' 8.78" (1.747 m)   Wt 106 kg (234 lb 6.4 oz)   SpO2 94%   BMI 34.84 kg/m²     Physical Exam  Vitals reviewed. Constitutional:       General: She is awake. She is not in acute distress. Appearance: Normal appearance. She is well-developed and well-groomed. She is not ill-appearing. HENT:      Head: Normocephalic. Right Ear: Hearing, tympanic membrane, ear canal and external ear normal. No middle ear effusion. Tympanic membrane is not bulging. Left Ear: Hearing, tympanic membrane, ear canal and external ear normal.  No middle ear effusion. Tympanic membrane is not bulging. Nose: Nose normal. No mucosal edema or rhinorrhea. Mouth/Throat:      Mouth: Mucous membranes are moist. Mucous membranes are not dry. Pharynx: No oropharyngeal exudate or posterior oropharyngeal erythema. Tonsils: No tonsillar abscesses. Eyes:      General: Lids are normal.      Conjunctiva/sclera: Conjunctivae normal.   Neck:      Vascular: Normal carotid pulses. No carotid bruit. Cardiovascular:      Rate and Rhythm: Normal rate and regular rhythm. Heart sounds: Normal heart sounds. No murmur heard. Pulmonary:      Effort: Pulmonary effort is normal. No accessory muscle usage or respiratory distress. Breath sounds: Examination of the right-upper field reveals rhonchi. Examination of the left-upper field reveals rhonchi. Examination of the right-middle field reveals rhonchi. Examination of the left-middle field reveals wheezing. Wheezing and rhonchi present. No decreased breath sounds or rales. Lymphadenopathy:      Head:      Right side of head: No submental, submandibular, tonsillar, preauricular, posterior auricular or occipital adenopathy. Left side of head: No submental, submandibular, tonsillar, preauricular, posterior auricular or occipital adenopathy. Cervical: No cervical adenopathy. Skin:     General: Skin is warm and dry. Findings: Lesion present.       Comments: Several flesh colored papules around eyes, there is one lateral to the left eye that has a scabbed appearance and pt has been picking at it. Neurological:      Mental Status: She is alert and oriented to person, place, and time. Psychiatric:         Attention and Perception: Attention normal.         Mood and Affect: Mood normal.         Speech: Speech normal.         Behavior: Behavior normal. Behavior is cooperative. Thought Content:  Thought content normal.         Cognition and Memory: Cognition normal.         Judgment: Judgment normal.       KERLINE Jacques

## 2023-08-21 ENCOUNTER — TELEPHONE (OUTPATIENT)
Dept: FAMILY MEDICINE CLINIC | Facility: CLINIC | Age: 63
End: 2023-08-21

## 2023-08-21 DIAGNOSIS — J18.9 COMMUNITY ACQUIRED PNEUMONIA OF LEFT LOWER LOBE OF LUNG: Primary | ICD-10-CM

## 2023-08-21 RX ORDER — AMOXICILLIN AND CLAVULANATE POTASSIUM 875; 125 MG/1; MG/1
1 TABLET, FILM COATED ORAL EVERY 12 HOURS SCHEDULED
Qty: 14 TABLET | Refills: 0 | Status: SHIPPED | OUTPATIENT
Start: 2023-08-21 | End: 2023-08-28

## 2023-08-21 NOTE — TELEPHONE ENCOUNTER
Left message on machine to review x-ray result. She does have LLL pneumonia and will start additional antibiotic, augmentin. She should continue the azithromycin as well.   Pt instructed to call for reevaluation if sx worsen or persist.

## 2023-08-30 DIAGNOSIS — E11.9 CONTROLLED TYPE 2 DIABETES MELLITUS WITHOUT COMPLICATION, WITHOUT LONG-TERM CURRENT USE OF INSULIN (HCC): ICD-10-CM

## 2023-08-30 DIAGNOSIS — I10 ESSENTIAL HYPERTENSION: ICD-10-CM

## 2023-08-30 DIAGNOSIS — R06.2 WHEEZING: ICD-10-CM

## 2023-08-30 RX ORDER — ALBUTEROL SULFATE 90 UG/1
AEROSOL, METERED RESPIRATORY (INHALATION)
Qty: 18 G | Refills: 0 | Status: SHIPPED | OUTPATIENT
Start: 2023-08-30

## 2023-08-30 RX ORDER — LISINOPRIL AND HYDROCHLOROTHIAZIDE 12.5; 1 MG/1; MG/1
TABLET ORAL
Qty: 90 TABLET | Refills: 2 | Status: SHIPPED | OUTPATIENT
Start: 2023-08-30

## 2023-09-01 DIAGNOSIS — E11.9 TYPE 2 DIABETES MELLITUS WITHOUT COMPLICATION, WITHOUT LONG-TERM CURRENT USE OF INSULIN (HCC): ICD-10-CM

## 2023-09-01 RX ORDER — LANCETS 33 GAUGE
EACH MISCELLANEOUS
Qty: 100 EACH | Refills: 7 | Status: SHIPPED | OUTPATIENT
Start: 2023-09-01

## 2023-09-11 DIAGNOSIS — E78.5 DYSLIPIDEMIA: ICD-10-CM

## 2023-09-11 RX ORDER — ATORVASTATIN CALCIUM 40 MG/1
40 TABLET, FILM COATED ORAL DAILY
Qty: 90 TABLET | Refills: 3 | Status: SHIPPED | OUTPATIENT
Start: 2023-09-11

## 2023-09-18 DIAGNOSIS — E78.5 DYSLIPIDEMIA: ICD-10-CM

## 2023-09-19 RX ORDER — ATORVASTATIN CALCIUM 40 MG/1
40 TABLET, FILM COATED ORAL DAILY
Qty: 90 TABLET | Refills: 3 | OUTPATIENT
Start: 2023-09-19

## 2023-10-04 ENCOUNTER — RA CDI HCC (OUTPATIENT)
Dept: OTHER | Facility: HOSPITAL | Age: 63
End: 2023-10-04

## 2023-10-12 ENCOUNTER — OFFICE VISIT (OUTPATIENT)
Dept: FAMILY MEDICINE CLINIC | Facility: CLINIC | Age: 63
End: 2023-10-12
Payer: MEDICARE

## 2023-10-12 VITALS
BODY MASS INDEX: 36.01 KG/M2 | DIASTOLIC BLOOD PRESSURE: 60 MMHG | RESPIRATION RATE: 16 BRPM | SYSTOLIC BLOOD PRESSURE: 120 MMHG | OXYGEN SATURATION: 96 % | HEART RATE: 78 BPM | HEIGHT: 68 IN | WEIGHT: 237.6 LBS | TEMPERATURE: 98.6 F

## 2023-10-12 DIAGNOSIS — E11.9 TYPE 2 DIABETES MELLITUS WITHOUT COMPLICATION, WITHOUT LONG-TERM CURRENT USE OF INSULIN (HCC): Primary | ICD-10-CM

## 2023-10-12 DIAGNOSIS — E66.01 CLASS 2 SEVERE OBESITY DUE TO EXCESS CALORIES WITH SERIOUS COMORBIDITY AND BODY MASS INDEX (BMI) OF 36.0 TO 36.9 IN ADULT: ICD-10-CM

## 2023-10-12 DIAGNOSIS — Z23 ENCOUNTER FOR IMMUNIZATION: ICD-10-CM

## 2023-10-12 DIAGNOSIS — E78.5 DYSLIPIDEMIA: ICD-10-CM

## 2023-10-12 DIAGNOSIS — I10 BENIGN HYPERTENSION: ICD-10-CM

## 2023-10-12 PROBLEM — J40 BRONCHITIS: Status: RESOLVED | Noted: 2019-07-03 | Resolved: 2023-10-12

## 2023-10-12 PROBLEM — R07.89 CHEST PRESSURE: Status: RESOLVED | Noted: 2023-03-29 | Resolved: 2023-10-12

## 2023-10-12 PROCEDURE — 99214 OFFICE O/P EST MOD 30 MIN: CPT | Performed by: FAMILY MEDICINE

## 2023-10-12 PROCEDURE — 90662 IIV NO PRSV INCREASED AG IM: CPT

## 2023-10-12 PROCEDURE — G0008 ADMIN INFLUENZA VIRUS VAC: HCPCS

## 2023-10-12 NOTE — PROGRESS NOTES
Assessment/Plan:    1. Type 2 diabetes mellitus without complication, without long-term current use of insulin ([address])  Assessment & Plan: Will get labs done next week  Lab Results   Component Value Date    HGBA1C 6.9 (H) 06/01/2023       Orders:  -     Ambulatory Referral to Ophthalmology; Future    2. Benign hypertension  Assessment & Plan:  Stable on current meds      3. Dyslipidemia  Assessment & Plan:  Check lkipds      4. Encounter for immunization  -     influenza vaccine, high-dose, PF 0.7 mL (FLUZONE HIGH-DOSE)    5. Class 2 severe obesity due to excess calories with serious comorbidity and body mass index (BMI) of 36.0 to 36.9 in adult   Assessment & Plan:  Diet and exercise              There are no Patient Instructions on file for this visit. Return in about 2 months (around 12/12/2023) for Annual physical.    Subjective:      Patient ID: Surekha Worley is a 70 y.o. female. Chief Complaint   Patient presents with   • Follow-up     4 month follow up       Here for follow up  August was sick  Didn't get labs done  Overall feeling ok  Has appt in Jan with Heme  Having problems falling asleep      Hypertension  This is a chronic problem. The current episode started more than 1 year ago. The problem is unchanged. The problem is controlled. There are no associated agents to hypertension. Past treatments include ACE inhibitors. The current treatment provides significant improvement. There are no compliance problems. Hyperlipidemia  This is a chronic problem. The current episode started more than 1 year ago. The problem is controlled. Recent lipid tests were reviewed and are normal. Current antihyperlipidemic treatment includes statins. The current treatment provides significant improvement of lipids. There are no compliance problems.         The following portions of the patient's history were reviewed and updated as appropriate: allergies, current medications, past family history, past medical history, past social history, past surgical history and problem list.    Review of Systems   Constitutional: Negative. HENT: Negative. Eyes: Negative. Respiratory: Negative. Cardiovascular: Negative. Gastrointestinal: Negative. Endocrine: Negative. Genitourinary: Negative. Musculoskeletal: Negative. Skin: Negative. Allergic/Immunologic: Negative. Neurological: Negative. Hematological: Negative. Psychiatric/Behavioral:  Positive for sleep disturbance. Current Outpatient Medications   Medication Sig Dispense Refill   • albuterol (PROVENTIL HFA,VENTOLIN HFA) 90 mcg/act inhaler INHALE 2 PUFFS EVERY 6 HOURS AS NEEDED FOR WHEEZING 18 g 0   • atorvastatin (LIPITOR) 40 mg tablet Take 1 tablet (40 mg total) by mouth daily 90 tablet 3   • Blood Glucose Monitoring Suppl (OneTouch Verio Reflect) w/Device KIT Check blood sugars twice daily. Please substitute with appropriate alternative as covered by patient's insurance. Dx: E11.65 1 kit 0   • glucose blood (OneTouch Verio) test strip CHECK BLOOD SUGARS TWICE DAILY 100 strip 7   • Jardiance 25 MG TABS TAKE 1 TABLET BY MOUTH EVERY DAY IN THE MORNING 30 tablet 3   • Lancets (OneTouch Delica Plus [id number]) MISC CHECK BLOOD SUGARS TWICE DAILY 100 each 7   • lisinopril-hydrochlorothiazide (PRINZIDE,ZESTORETIC) 10-12.5 MG per tablet TAKE 1 TABLET BY MOUTH EVERY DAY 90 tablet 2   • metFORMIN (GLUCOPHAGE) 500 mg tablet TAKE 2 TABLETS BY MOUTH TWICE A DAY WITH FOOD 360 tablet 2   • Multiple Vitamins-Minerals (ONE-A-DAY 50 PLUS PO) Take by mouth     • guaifenesin-codeine (GUAIFENESIN AC) 100-10 MG/5ML liquid TAKE 1-2 TSP BY  MOUTH EVERY 4-6 HOURS AS NEEDED FOR COUGH (Patient not taking: Reported on 10/12/2023) 180 mL 0     No current facility-administered medications for this visit.        Objective:    /60 (BP Location: Left arm, Patient Position: Sitting, Cuff Size: Large)   Pulse 78   Temp 98.6 °F (37 °C) (Tympanic)   Resp 16 Ht 5' 8" (1.727 m)   Wt 108 kg (237 lb 9.6 oz)   SpO2 96%   BMI 36.13 kg/m²        Physical Exam  Vitals and nursing note reviewed. Constitutional:       Appearance: Normal appearance. She is well-developed. HENT:      Head: Normocephalic and atraumatic. Nose: Nose normal.   Eyes:      General: Lids are normal.      Extraocular Movements: Extraocular movements intact. Conjunctiva/sclera: Conjunctivae normal.      Pupils: Pupils are equal, round, and reactive to light. Cardiovascular:      Rate and Rhythm: Normal rate and regular rhythm. Pulses: no weak pulses          Dorsalis pedis pulses are 2+ on the right side and 2+ on the left side. Heart sounds: Normal heart sounds, S1 normal and S2 normal.   Pulmonary:      Effort: Pulmonary effort is normal.      Breath sounds: Normal breath sounds. Abdominal:      General: Bowel sounds are normal.      Palpations: Abdomen is soft. Musculoskeletal:         General: Normal range of motion. Cervical back: Normal range of motion and neck supple. Feet:      Right foot:      Skin integrity: No ulcer, skin breakdown, erythema, warmth, callus or dry skin. Left foot:      Skin integrity: No ulcer, skin breakdown, erythema, warmth, callus or dry skin. Skin:     General: Skin is warm and dry. Neurological:      General: No focal deficit present. Mental Status: She is alert and oriented to person, place, and time. Deep Tendon Reflexes: Reflexes are normal and symmetric. Psychiatric:         Speech: Speech normal.         Behavior: Behavior normal.         Thought Content: Thought content normal.         Judgment: Judgment normal.            Patient's shoes and socks removed. Right Foot/Ankle   Right Foot Inspection  Skin Exam: skin normal and skin intact. No dry skin, no warmth, no callus, no erythema, no maceration, no abnormal color, no pre-ulcer, no ulcer and no callus.      Toe Exam: ROM and strength within normal limits. Sensory   Monofilament testing: intact    Vascular  Capillary refills: < 3 seconds  The right DP pulse is 2+. Left Foot/Ankle  Left Foot Inspection  Skin Exam: skin normal and skin intact. No dry skin, no warmth, no erythema, no maceration, normal color, no pre-ulcer, no ulcer and no callus. Toe Exam: ROM and strength within normal limits. Sensory   Monofilament testing: intact    Vascular  Capillary refills: < 3 seconds  The left DP pulse is 2+.      Assign Risk Category  No deformity present  No loss of protective sensation  No weak pulses  Risk: 0       Juan Lopez,

## 2023-10-20 ENCOUNTER — APPOINTMENT (OUTPATIENT)
Dept: LAB | Facility: CLINIC | Age: 63
End: 2023-10-20
Payer: MEDICARE

## 2023-10-20 DIAGNOSIS — E11.9 CONTROLLED TYPE 2 DIABETES MELLITUS WITHOUT COMPLICATION, WITHOUT LONG-TERM CURRENT USE OF INSULIN (HCC): ICD-10-CM

## 2023-10-20 DIAGNOSIS — E78.5 DYSLIPIDEMIA: ICD-10-CM

## 2023-10-20 LAB
CHOLEST SERPL-MCNC: 127 MG/DL
EST. AVERAGE GLUCOSE BLD GHB EST-MCNC: 169 MG/DL
HBA1C MFR BLD: 7.5 %
HDLC SERPL-MCNC: 46 MG/DL
LDLC SERPL CALC-MCNC: 50 MG/DL (ref 0–100)
TRIGL SERPL-MCNC: 157 MG/DL

## 2023-10-20 PROCEDURE — 83036 HEMOGLOBIN GLYCOSYLATED A1C: CPT

## 2023-10-20 PROCEDURE — 36415 COLL VENOUS BLD VENIPUNCTURE: CPT

## 2023-10-20 PROCEDURE — 80061 LIPID PANEL: CPT

## 2023-12-02 DIAGNOSIS — E11.9 CONTROLLED TYPE 2 DIABETES MELLITUS WITHOUT COMPLICATION, WITHOUT LONG-TERM CURRENT USE OF INSULIN (HCC): ICD-10-CM

## 2023-12-04 RX ORDER — EMPAGLIFLOZIN 25 MG/1
TABLET, FILM COATED ORAL
Qty: 30 TABLET | Refills: 5 | Status: SHIPPED | OUTPATIENT
Start: 2023-12-04

## 2023-12-20 ENCOUNTER — OFFICE VISIT (OUTPATIENT)
Dept: FAMILY MEDICINE CLINIC | Facility: CLINIC | Age: 63
End: 2023-12-20
Payer: MEDICARE

## 2023-12-20 VITALS
DIASTOLIC BLOOD PRESSURE: 70 MMHG | OXYGEN SATURATION: 98 % | SYSTOLIC BLOOD PRESSURE: 118 MMHG | RESPIRATION RATE: 16 BRPM | BODY MASS INDEX: 35.22 KG/M2 | HEIGHT: 69 IN | TEMPERATURE: 98.5 F | HEART RATE: 74 BPM | WEIGHT: 237.8 LBS

## 2023-12-20 DIAGNOSIS — G89.29 CHRONIC PAIN OF RIGHT KNEE: ICD-10-CM

## 2023-12-20 DIAGNOSIS — E78.5 DYSLIPIDEMIA: ICD-10-CM

## 2023-12-20 DIAGNOSIS — E11.9 TYPE 2 DIABETES MELLITUS WITHOUT COMPLICATION, WITHOUT LONG-TERM CURRENT USE OF INSULIN (HCC): ICD-10-CM

## 2023-12-20 DIAGNOSIS — Z12.31 ENCOUNTER FOR SCREENING MAMMOGRAM FOR MALIGNANT NEOPLASM OF BREAST: ICD-10-CM

## 2023-12-20 DIAGNOSIS — I10 BENIGN HYPERTENSION: ICD-10-CM

## 2023-12-20 DIAGNOSIS — M25.561 CHRONIC PAIN OF RIGHT KNEE: ICD-10-CM

## 2023-12-20 DIAGNOSIS — Z00.00 MEDICARE ANNUAL WELLNESS VISIT, SUBSEQUENT: Primary | ICD-10-CM

## 2023-12-20 PROCEDURE — G0402 INITIAL PREVENTIVE EXAM: HCPCS | Performed by: FAMILY MEDICINE

## 2023-12-20 PROCEDURE — 99214 OFFICE O/P EST MOD 30 MIN: CPT | Performed by: FAMILY MEDICINE

## 2023-12-20 NOTE — PROGRESS NOTES
Assessment and Plan:     Problem List Items Addressed This Visit     Benign hypertension     Stable on current meds         Relevant Orders    Comprehensive metabolic panel    Diabetes mellitus type 2, uncomplicated (HCC)     Check levels  Lab Results   Component Value Date    HGBA1C 7.5 (H) 10/20/2023          Relevant Orders    Albumin / creatinine urine ratio    Hemoglobin A1C    Dyslipidemia     Check lipids         Relevant Orders    TSH, 3rd generation with Free T4 reflex    Lipid Panel with Direct LDL reflex    Medicare annual wellness visit, subsequent - Primary     Prevnar next year  Tdap at pharmacy  Mammogram and labs ordered         Chronic pain of right knee     Refer to ortho         Relevant Orders    Ambulatory Referral to Orthopedic Surgery   Other Visit Diagnoses     Encounter for screening mammogram for malignant neoplasm of breast        Relevant Orders    Mammo screening bilateral w 3d & cad          Depression Screening and Follow-up Plan: Patient was screened for depression during today's encounter. They screened negative with a PHQ-2 score of 1.    Urinary Incontinence Plan of Care: counseling topics discussed: limiting fluid intake 3-4 hours before bed.       Preventive health issues were discussed with patient, and age appropriate screening tests were ordered as noted in patient's After Visit Summary.  Personalized health advice and appropriate referrals for health education or preventive services given if needed, as noted in patient's After Visit Summary.     History of Present Illness:     Patient presents for a Medicare Wellness Visit    Here for awv  Some aches and pain  Cold symptoms       Patient Care Team:  Marianne Mayes DO as PCP - General  MD Marianne Umanzor DO     Review of Systems:     Review of Systems   Constitutional: Negative.    HENT:  Positive for congestion.    Eyes: Negative.    Respiratory: Negative.     Cardiovascular: Negative.     Gastrointestinal: Negative.    Endocrine: Negative.    Genitourinary: Negative.    Musculoskeletal:  Positive for arthralgias (knee pain).   Allergic/Immunologic: Negative.    Neurological: Negative.    Hematological: Negative.    Psychiatric/Behavioral: Negative.          Problem List:     Patient Active Problem List   Diagnosis   • Benign hypertension   • Diabetes mellitus type 2, uncomplicated (HCC)   • Dyslipidemia   • Eye lesion   • Class 2 severe obesity due to excess calories with serious comorbidity and body mass index (BMI) of 36.0 to 36.9 in adult    • Polyp of sigmoid colon   • Atypical nevi   • Annual physical exam   • Encounter for screening mammogram for breast cancer   • Screening for colon cancer   • Snoring   • Hepatic lesion   • Endometrial thickening on ultrasound   • Right upper lobe pulmonary nodule   • Dysphagia   • Neuroendocrine tumor of liver   • 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)   • Mesentery metastasis of neuroendocrine tumor (HCC)   • Decreased hearing of both ears   • Medicare annual wellness visit, subsequent   • Controlled type 2 diabetes mellitus without complication, without long-term current use of insulin (HCC)   • Atypical mole   • Essential hypertension   • Bradycardia   • Skin lesion of face   • Chronic pain of right knee      Past Medical and Surgical History:     Past Medical History:   Diagnosis Date   • Bronchitis 7/3/2019   • Diabetes mellitus (HCC)    • Hyperlipidemia    • Hypertension    • Syncope 5/3/2022   • Vasovagal syncope 6/8/2020     Past Surgical History:   Procedure Laterality Date   • APPENDECTOMY      age 4    • COLONOSCOPY     • IR BIOPSY LIVER MASS  7/1/2020   • TONSILLECTOMY      age 5       Family History:     Family History   Problem Relation Age of Onset   • Atrial fibrillation Mother    • Diabetes Father    • Heart disease Sister         ventricular tachycardia      Social History:     Social History     Socioeconomic History   •  Marital status:      Spouse name: None   • Number of children: None   • Years of education: None   • Highest education level: None   Occupational History   • None   Tobacco Use   • Smoking status: Never     Passive exposure: Never   • Smokeless tobacco: Never   Vaping Use   • Vaping status: Never Used   Substance and Sexual Activity   • Alcohol use: Not Currently   • Drug use: Never   • Sexual activity: Not Currently   Other Topics Concern   • None   Social History Narrative   • None     Social Determinants of Health     Financial Resource Strain: Low Risk  (12/20/2023)    Overall Financial Resource Strain (CARDIA)    • Difficulty of Paying Living Expenses: Not hard at all   Food Insecurity: Not on file   Transportation Needs: No Transportation Needs (12/20/2023)    PRAPARE - Transportation    • Lack of Transportation (Medical): No    • Lack of Transportation (Non-Medical): No   Physical Activity: Not on file   Stress: Not on file   Social Connections: Not on file   Intimate Partner Violence: Not on file   Housing Stability: Not on file      Medications and Allergies:     Current Outpatient Medications   Medication Sig Dispense Refill   • albuterol (PROVENTIL HFA,VENTOLIN HFA) 90 mcg/act inhaler INHALE 2 PUFFS EVERY 6 HOURS AS NEEDED FOR WHEEZING 18 g 0   • atorvastatin (LIPITOR) 40 mg tablet Take 1 tablet (40 mg total) by mouth daily 90 tablet 3   • Blood Glucose Monitoring Suppl (OneTouch Verio Reflect) w/Device KIT Check blood sugars twice daily. Please substitute with appropriate alternative as covered by patient's insurance. Dx: E11.65 1 kit 0   • Empagliflozin (Jardiance) 25 MG TABS TAKE 1 TABLET BY MOUTH EVERY DAY IN THE MORNING 30 tablet 5   • glucose blood (OneTouch Verio) test strip CHECK BLOOD SUGARS TWICE DAILY 100 strip 7   • Lancets (OneTouch Delica Plus [id number]) MISC CHECK BLOOD SUGARS TWICE DAILY 100 each 7   • lisinopril-hydrochlorothiazide (PRINZIDE,ZESTORETIC) 10-12.5 MG per tablet TAKE 1  TABLET BY MOUTH EVERY DAY 90 tablet 2   • metFORMIN (GLUCOPHAGE) 500 mg tablet TAKE 2 TABLETS BY MOUTH TWICE A DAY WITH FOOD 360 tablet 2   • Multiple Vitamins-Minerals (ONE-A-DAY 50 PLUS PO) Take by mouth     • guaifenesin-codeine (GUAIFENESIN AC) 100-10 MG/5ML liquid TAKE 1-2 TSP BY  MOUTH EVERY 4-6 HOURS AS NEEDED FOR COUGH (Patient not taking: Reported on 10/12/2023) 180 mL 0     No current facility-administered medications for this visit.     No Known Allergies   Immunizations:     Immunization History   Administered Date(s) Administered   • COVID-19 J&J (Irving) vaccine 0.5 mL 08/24/2021   • INFLUENZA 11/30/2022   • Influenza Quadrivalent Preservative Free 3 years and older IM 12/30/2015, 12/13/2016, 09/19/2017   • Influenza, high dose seasonal 0.7 mL 10/12/2023   • Influenza, recombinant, quadrivalent,injectable, preservative free 11/30/2022   • Pneumococcal Conjugate 13-Valent 06/13/2016   • Pneumococcal Polysaccharide PPV23 02/26/2019   • Tdap 08/07/2015   • Tuberculin Skin Test-PPD Intradermal 08/04/2020      Health Maintenance:         Topic Date Due   • Hepatitis C Screening  Never done   • Cervical Cancer Screening  Never done   • Breast Cancer Screening: Mammogram  08/24/2016   • Colorectal Cancer Screening  09/18/2016   • HIV Screening  12/30/2023 (Originally 5/13/1975)         Topic Date Due   • COVID-19 Vaccine (2 - Irving risk series) 09/21/2021      Medicare Screening Tests and Risk Assessments:     Elsa is here for her Subsequent Wellness visit.     Health Risk Assessment:   Patient rates overall health as good. Patient feels that their physical health rating is slightly worse. Patient is very satisfied with their life. Eyesight was rated as slightly worse. Hearing was rated as slightly worse. Patient feels that their emotional and mental health rating is same. Patients states they are never, rarely angry. Patient states they are often unusually tired/fatigued. Pain experienced in the last 7  days has been some. Patient's pain rating has been 4/10. Patient states that she has experienced no weight loss or gain in last 6 months.     Depression Screening:   PHQ-2 Score: 1      Fall Risk Screening:   In the past year, patient has experienced: no history of falling in past year      Urinary Incontinence Screening:   Patient has leaked urine accidently in the last six months.     Home Safety:  Patient does not have trouble with stairs inside or outside of their home. Patient has working smoke alarms and has working carbon monoxide detector. Home safety hazards include: none.     Nutrition:   Current diet is Diabetic.     Medications:   Patient is not currently taking any over-the-counter supplements. Patient is able to manage medications.     Activities of Daily Living (ADLs)/Instrumental Activities of Daily Living (IADLs):   Walk and transfer into and out of bed and chair?: Yes  Dress and groom yourself?: Yes    Bathe or shower yourself?: Yes    Feed yourself? Yes  Do your laundry/housekeeping?: Yes  Manage your money, pay your bills and track your expenses?: Yes  Make your own meals?: Yes    Do your own shopping?: Yes    Previous Hospitalizations:   Any hospitalizations or ED visits within the last 12 months?: No      Advance Care Planning:   Living will: Yes    Durable POA for healthcare: Yes    Advanced directive: Yes      PREVENTIVE SCREENINGS      Cardiovascular Screening:    General: Screening Not Indicated and History Lipid Disorder      Diabetes Screening:     General: Screening Not Indicated and History Diabetes      Colorectal Cancer Screening:     General: Screening Current      Cervical Cancer Screening:    General: Screening Not Indicated      Lung Cancer Screening:     General: Screening Not Indicated    Screening, Brief Intervention, and Referral to Treatment (SBIRT)    Screening      Single Item Drug Screening:  How often have you used an illegal drug (including marijuana) or a prescription  "medication for non-medical reasons in the past year? never    Single Item Drug Screen Score: 0  Interpretation: Negative screen for possible drug use disorder    No results found.     Physical Exam:     /70 (BP Location: Left arm, Patient Position: Sitting, Cuff Size: Large)   Pulse 74   Temp 98.5 °F (36.9 °C) (Tympanic)   Resp 16   Ht 5' 8.78\" (1.747 m)   Wt 108 kg (237 lb 12.8 oz)   SpO2 98%   BMI 35.34 kg/m²     Physical Exam  Vitals and nursing note reviewed.   Constitutional:       Appearance: Normal appearance. She is well-developed.   HENT:      Head: Normocephalic and atraumatic.      Right Ear: External ear normal.      Left Ear: External ear normal.      Nose: Nose normal.   Eyes:      Extraocular Movements: Extraocular movements intact.      Conjunctiva/sclera: Conjunctivae normal.      Pupils: Pupils are equal, round, and reactive to light.   Cardiovascular:      Rate and Rhythm: Normal rate and regular rhythm.      Heart sounds: Normal heart sounds.   Pulmonary:      Effort: Pulmonary effort is normal.      Breath sounds: Normal breath sounds.   Abdominal:      General: Bowel sounds are normal.      Palpations: Abdomen is soft.   Musculoskeletal:         General: Normal range of motion.      Cervical back: Normal range of motion and neck supple.   Skin:     General: Skin is warm and dry.      Capillary Refill: Capillary refill takes less than 2 seconds.   Neurological:      General: No focal deficit present.      Mental Status: She is alert and oriented to person, place, and time.   Psychiatric:         Behavior: Behavior normal.         Thought Content: Thought content normal.         Judgment: Judgment normal.          Marianne Mayes DO  "

## 2023-12-20 NOTE — PATIENT INSTRUCTIONS
Medicare Preventive Visit Patient Instructions  Thank you for completing your Welcome to Medicare Visit or Medicare Annual Wellness Visit today. Your next wellness visit will be due in one year (12/20/2024).  The screening/preventive services that you may require over the next 5-10 years are detailed below. Some tests may not apply to you based off risk factors and/or age. Screening tests ordered at today's visit but not completed yet may show as past due. Also, please note that scanned in results may not display below.  Preventive Screenings:  Service Recommendations Previous Testing/Comments   Colorectal Cancer Screening  * Colonoscopy    * Fecal Occult Blood Test (FOBT)/Fecal Immunochemical Test (FIT)  * Fecal DNA/Cologuard Test  * Flexible Sigmoidoscopy Age: 45-75 years old   Colonoscopy: every 10 years (may be performed more frequently if at higher risk)  OR  FOBT/FIT: every 1 year  OR  Cologuard: every 3 years  OR  Sigmoidoscopy: every 5 years  Screening may be recommended earlier than age 45 if at higher risk for colorectal cancer. Also, an individualized decision between you and your healthcare provider will decide whether screening between the ages of 76-85 would be appropriate. Colonoscopy: 09/18/2015  FOBT/FIT: Not on file  Cologuard: Not on file  Sigmoidoscopy: Not on file    Screening Current     Breast Cancer Screening Age: 40+ years old  Frequency: every 1-2 years  Not required if history of left and right mastectomy Mammogram: 08/24/2015        Cervical Cancer Screening Between the ages of 21-29, pap smear recommended once every 3 years.   Between the ages of 30-65, can perform pap smear with HPV co-testing every 5 years.   Recommendations may differ for women with a history of total hysterectomy, cervical cancer, or abnormal pap smears in past. Pap Smear: Not on file    Screening Not Indicated   Hepatitis C Screening Once for adults born between 1945 and 1965  More frequently in patients at high  risk for Hepatitis C Hep C Antibody: Not on file        Diabetes Screening 1-2 times per year if you're at risk for diabetes or have pre-diabetes Fasting glucose: 146 mg/dL (6/1/2023)  A1C: 7.5 % (10/20/2023)  Screening Not Indicated  History Diabetes   Cholesterol Screening Once every 5 years if you don't have a lipid disorder. May order more often based on risk factors. Lipid panel: 10/20/2023    Screening Not Indicated  History Lipid Disorder     Other Preventive Screenings Covered by Medicare:  Abdominal Aortic Aneurysm (AAA) Screening: covered once if your at risk. You're considered to be at risk if you have a family history of AAA.  Lung Cancer Screening: covers low dose CT scan once per year if you meet all of the following conditions: (1) Age 55-77; (2) No signs or symptoms of lung cancer; (3) Current smoker or have quit smoking within the last 15 years; (4) You have a tobacco smoking history of at least 20 pack years (packs per day multiplied by number of years you smoked); (5) You get a written order from a healthcare provider.  Glaucoma Screening: covered annually if you're considered high risk: (1) You have diabetes OR (2) Family history of glaucoma OR (3)  aged 50 and older OR (4)  American aged 65 and older  Osteoporosis Screening: covered every 2 years if you meet one of the following conditions: (1) You're estrogen deficient and at risk for osteoporosis based off medical history and other findings; (2) Have a vertebral abnormality; (3) On glucocorticoid therapy for more than 3 months; (4) Have primary hyperparathyroidism; (5) On osteoporosis medications and need to assess response to drug therapy.   Last bone density test (DXA Scan): Not on file.  HIV Screening: covered annually if you're between the age of 15-65. Also covered annually if you are younger than 15 and older than 65 with risk factors for HIV infection. For pregnant patients, it is covered up to 3 times per  pregnancy.    Immunizations:  Immunization Recommendations   Influenza Vaccine Annual influenza vaccination during flu season is recommended for all persons aged >= 6 months who do not have contraindications   Pneumococcal Vaccine   * Pneumococcal conjugate vaccine = PCV13 (Prevnar 13), PCV15 (Vaxneuvance), PCV20 (Prevnar 20)  * Pneumococcal polysaccharide vaccine = PPSV23 (Pneumovax) Adults 19-65 yo with certain risk factors or if 65+ yo  If never received any pneumonia vaccine: recommend Prevnar 20 (PCV20)  Give PCV20 if previously received 1 dose of PCV13 or PPSV23   Hepatitis B Vaccine 3 dose series if at intermediate or high risk (ex: diabetes, end stage renal disease, liver disease)   Respiratory syncytial virus (RSV) Vaccine - COVERED BY MEDICARE PART D  * RSVPreF3 (Arexvy) CDC recommends that adults 60 years of age and older may receive a single dose of RSV vaccine using shared clinical decision-making (SCDM)   Tetanus (Td) Vaccine - COST NOT COVERED BY MEDICARE PART B Following completion of primary series, a booster dose should be given every 10 years to maintain immunity against tetanus. Td may also be given as tetanus wound prophylaxis.   Tdap Vaccine - COST NOT COVERED BY MEDICARE PART B Recommended at least once for all adults. For pregnant patients, recommended with each pregnancy.   Shingles Vaccine (Shingrix) - COST NOT COVERED BY MEDICARE PART B  2 shot series recommended in those 19 years and older who have or will have weakened immune systems or those 50 years and older     Health Maintenance Due:      Topic Date Due   • Hepatitis C Screening  Never done   • Cervical Cancer Screening  Never done   • Breast Cancer Screening: Mammogram  08/24/2016   • Colorectal Cancer Screening  09/18/2016   • HIV Screening  12/30/2023 (Originally 5/13/1975)     Immunizations Due:      Topic Date Due   • COVID-19 Vaccine (2 - Irving risk series) 09/21/2021     Advance Directives   What are advance directives?   Advance directives are legal documents that state your wishes and plans for medical care. These plans are made ahead of time in case you lose your ability to make decisions for yourself. Advance directives can apply to any medical decision, such as the treatments you want, and if you want to donate organs.   What are the types of advance directives?  There are many types of advance directives, and each state has rules about how to use them. You may choose a combination of any of the following:  Living will:  This is a written record of the treatment you want. You can also choose which treatments you do not want, which to limit, and which to stop at a certain time. This includes surgery, medicine, IV fluid, and tube feedings.   Durable power of  for healthcare (DPAHC):  This is a written record that states who you want to make healthcare choices for you when you are unable to make them for yourself. This person, called a proxy, is usually a family member or a friend. You may choose more than 1 proxy.  Do not resuscitate (DNR) order:  A DNR order is used in case your heart stops beating or you stop breathing. It is a request not to have certain forms of treatment, such as CPR. A DNR order may be included in other types of advance directives.  Medical directive:  This covers the care that you want if you are in a coma, near death, or unable to make decisions for yourself. You can list the treatments you want for each condition. Treatment may include pain medicine, surgery, blood transfusions, dialysis, IV or tube feedings, and a ventilator (breathing machine).  Values history:  This document has questions about your views, beliefs, and how you feel and think about life. This information can help others choose the care that you would choose.  Why are advance directives important?  An advance directive helps you control your care. Although spoken wishes may be used, it is better to have your wishes written down.  Spoken wishes can be misunderstood, or not followed. Treatments may be given even if you do not want them. An advance directive may make it easier for your family to make difficult choices about your care.   Urinary Incontinence   Urinary incontinence (UI)  is when you lose control of your bladder. UI develops because your bladder cannot store or empty urine properly. The 3 most common types of UI are stress incontinence, urge incontinence, or both.  Medicines:   May be given to help strengthen your bladder control. Report any side effects of medication to your healthcare provider.  Do pelvic muscle exercises often:  Your pelvic muscles help you stop urinating. Squeeze these muscles tight for 5 seconds, then relax for 5 seconds. Gradually work up to squeezing for 10 seconds. Do 3 sets of 15 repetitions a day, or as directed. This will help strengthen your pelvic muscles and improve bladder control.  Train your bladder:  Go to the bathroom at set times, such as every 2 hours, even if you do not feel the urge to go. You can also try to hold your urine when you feel the urge to go. For example, hold your urine for 5 minutes when you feel the urge to go. As that becomes easier, hold your urine for 10 minutes.   Self-care:   Keep a UI record.  Write down how often you leak urine and how much you leak. Make a note of what you were doing when you leaked urine.  Drink liquids as directed. You may need to limit the amount of liquid you drink to help control your urine leakage. Do not drink any liquid right before you go to bed. Limit or do not have drinks that contain caffeine or alcohol.   Prevent constipation.  Eat a variety of high-fiber foods. Good examples are high-fiber cereals, beans, vegetables, and whole-grain breads. Walking is the best way to trigger your intestines to have a bowel movement.  Exercise regularly and maintain a healthy weight.  Weight loss and exercise will decrease pressure on your bladder and help  you control your leakage.   Use a catheter as directed  to help empty your bladder. A catheter is a tiny, plastic tube that is put into your bladder to drain your urine.   Go to behavior therapy as directed.  Behavior therapy may be used to help you learn to control your urge to urinate.    Weight Management   Why it is important to manage your weight:  Being overweight increases your risk of health conditions such as heart disease, high blood pressure, type 2 diabetes, and certain types of cancer. It can also increase your risk for osteoarthritis, sleep apnea, and other respiratory problems. Aim for a slow, steady weight loss. Even a small amount of weight loss can lower your risk of health problems.  How to lose weight safely:  A safe and healthy way to lose weight is to eat fewer calories and get regular exercise. You can lose up about 1 pound a week by decreasing the number of calories you eat by 500 calories each day.   Healthy meal plan for weight management:  A healthy meal plan includes a variety of foods, contains fewer calories, and helps you stay healthy. A healthy meal plan includes the following:  Eat whole-grain foods more often.  A healthy meal plan should contain fiber. Fiber is the part of grains, fruits, and vegetables that is not broken down by your body. Whole-grain foods are healthy and provide extra fiber in your diet. Some examples of whole-grain foods are whole-wheat breads and pastas, oatmeal, brown rice, and bulgur.  Eat a variety of vegetables every day.  Include dark, leafy greens such as spinach, kale, rowena greens, and mustard greens. Eat yellow and orange vegetables such as carrots, sweet potatoes, and winter squash.   Eat a variety of fruits every day.  Choose fresh or canned fruit (canned in its own juice or light syrup) instead of juice. Fruit juice has very little or no fiber.  Eat low-fat dairy foods.  Drink fat-free (skim) milk or 1% milk. Eat fat-free yogurt and low-fat  cottage cheese. Try low-fat cheeses such as mozzarella and other reduced-fat cheeses.  Choose meat and other protein foods that are low in fat.  Choose beans or other legumes such as split peas or lentils. Choose fish, skinless poultry (chicken or turkey), or lean cuts of red meat (beef or pork). Before you cook meat or poultry, cut off any visible fat.   Use less fat and oil.  Try baking foods instead of frying them. Add less fat, such as margarine, sour cream, regular salad dressing and mayonnaise to foods. Eat fewer high-fat foods. Some examples of high-fat foods include french fries, doughnuts, ice cream, and cakes.  Eat fewer sweets.  Limit foods and drinks that are high in sugar. This includes candy, cookies, regular soda, and sweetened drinks.  Exercise:  Exercise at least 30 minutes per day on most days of the week. Some examples of exercise include walking, biking, dancing, and swimming. You can also fit in more physical activity by taking the stairs instead of the elevator or parking farther away from stores. Ask your healthcare provider about the best exercise plan for you.      © Copyright [employer] 2018 Information is for End User's use only and may not be sold, redistributed or otherwise used for commercial purposes. All illustrations and images included in CareNotes® are the copyrighted property of [employer] Inc[employer] or [employer]

## 2024-01-24 ENCOUNTER — OFFICE VISIT (OUTPATIENT)
Dept: OBGYN CLINIC | Facility: MEDICAL CENTER | Age: 64
End: 2024-01-24
Payer: MEDICARE

## 2024-01-24 ENCOUNTER — APPOINTMENT (OUTPATIENT)
Dept: RADIOLOGY | Facility: MEDICAL CENTER | Age: 64
End: 2024-01-24
Payer: MEDICARE

## 2024-01-24 DIAGNOSIS — M17.11 PRIMARY OSTEOARTHRITIS OF RIGHT KNEE: Primary | ICD-10-CM

## 2024-01-24 DIAGNOSIS — G89.29 CHRONIC PAIN OF RIGHT KNEE: ICD-10-CM

## 2024-01-24 DIAGNOSIS — M25.561 CHRONIC PAIN OF RIGHT KNEE: ICD-10-CM

## 2024-01-24 PROCEDURE — 99203 OFFICE O/P NEW LOW 30 MIN: CPT | Performed by: PHYSICAL MEDICINE & REHABILITATION

## 2024-01-24 PROCEDURE — 73562 X-RAY EXAM OF KNEE 3: CPT

## 2024-01-24 NOTE — PATIENT INSTRUCTIONS
You can obtain diclofenac cream/gel/ointment over the counter.      Many brands make this medication and they include Voltaren, Aspercreme and Aleve.    You can use this up to 3 times per day.  It is best to wash the area with water, pat dry and then apply.  The cream absorbs better after this.    Be careful not to let any pets or children get in contact with the medication as it can be harmful to them.    If you develop a skin reaction, stop using the cream.     You will be starting physical therapy.      It is important to do home exercises as given by your physical therapist as you go through PT to speed up your recovery.    Physical therapy addresses the problems that are causing your pain, instead of covering them up, as some other treatment options do.

## 2024-01-24 NOTE — PROGRESS NOTES
1. Primary osteoarthritis of right knee    2. Chronic pain of right knee      Orders Placed This Encounter   Procedures    XR knee 3 vw right non injury    Ambulatory referral to Physical Therapy     No orders of the defined types were placed in this encounter.    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  We discussed different treatment options including intra-articular steroid injection, anti-inflammatories and bracing.  We decided to proceed with physical therapy.  I would like to see Elsa back in 6 weeks to reassess.  If she is still symptomatic, would consider an intra-articular steroid injection.  Can also consider viscosupplementation.    Imaging Studies (I personally reviewed images in PACS and report):  Right knee x-rays most recent to this encounter reviewed.  These images show mild-moderate medial joint space narrowing with subchondral sclerosis.  No acute osseous abnormalities.  There is a sesamoid bone consistent with a fabella in the posterior knee.  These findings are consistent with Kellgren-Kurtis grade 2 osteoarthritis.    Return in about 6 weeks (around 3/6/2024).    Patient is in agreement with the above plan.    HPI:  Elsa Garcia is a 63 y.o. female  who presents for evaluation of   Chief Complaint   Patient presents with    Right Knee - Pain       Onset/Mechanism: Pain started this past Summer without an injury.  Location: Inside the knee.  Radiation: Denies.  Provocative: Walking.  Severity: Painful.  Associated Symptoms: Locking when walking.  Treatment so far: No recent treatment.    Following history reviewed and updated:  Past Medical History:   Diagnosis Date    Bronchitis 7/3/2019    Diabetes mellitus (HCC)     Hyperlipidemia     Hypertension     Syncope 5/3/2022    Vasovagal syncope 6/8/2020     Past Surgical History:   Procedure Laterality Date    APPENDECTOMY      age 4     COLONOSCOPY      IR BIOPSY LIVER MASS  7/1/2020     TONSILLECTOMY      age 5      Social History   Social History     Substance and Sexual Activity   Alcohol Use Not Currently     Social History     Substance and Sexual Activity   Drug Use Never     Social History     Tobacco Use   Smoking Status Never    Passive exposure: Never   Smokeless Tobacco Never     Family History   Problem Relation Age of Onset    Atrial fibrillation Mother     Diabetes Father     Heart disease Sister         ventricular tachycardia     No Known Allergies     Constitutional:  There were no vitals taken for this visit.   General: NAD.  Eyes: Anicteric sclerae.  Neck: Supple.  Lungs: Unlabored breathing.  Cardiovascular: No lower extremity edema.  Skin: Intact without erythema.  Neurologic: Sensation intact to light touch.  Psychiatric: Mood and affect are appropriate.    Right Knee Exam     Tenderness   The patient is experiencing tenderness in the medial joint line.    Range of Motion   Extension:  normal     Tests   Varus: negative Valgus: negative    Other   Erythema: absent  Scars: absent  Sensation: normal  Pulse: present  Swelling: none  Effusion: no effusion present             Procedures

## 2024-01-31 NOTE — PROGRESS NOTES
PT Evaluation     Today's date: 2024  Patient name: Elsa Garcia  : 1960  MRN: [id number]  Referring provider: Aime Richards DO  Dx:   Encounter Diagnosis     ICD-10-CM    1. Primary osteoarthritis of right knee  M17.11           Start Time: 1630  Stop Time: 1705  Total time in clinic (min): 35 minutes    Assessment  Assessment details: Pt presents to PT today with chronic R knee pain and OA that started last summer.  Their symptoms include R knee pain and are increased when performing prolonged walking, bending, and stair negotiation. The pt presented with R knee ROM deficits which are contributing to their pain with activity. Special tests are negative for ligamentous pathology and their strength is diminished in the hip. The pt was educated on etiology of her pain and given an HEP that focuses on LE stretching and strengthening. The pt will benefit from skilled PT to address the above limitations.       Impairments: abnormal muscle firing, abnormal or restricted ROM, activity intolerance, impaired physical strength, lacks appropriate home exercise program and pain with function    Symptom irritability: lowUnderstanding of Dx/Px/POC: good   Prognosis: good  Prognosis details: The patient has good cognition, social support and several co-morbidities influencing rehab potential . The predicted functional outcome detailed in the goals are expected to be achieved in 5 visits but may be modified based on the early responses to the planned interventions      Goals  Short term goals   To be met in 5 visits    Pt will increase R knee flexion from 120deg to 125deg   Pt will increase R knee extension from -3deg to 0deg   Pt will improve LE strength from 4/5 to 5/5  Pt will amb >30mins without increasing knee pain  Pt will report increased ease with stair negotiation  Pt will have decreased pain from 3/10 to 0/10 with functional activities   Pt will improve FOTO score from 67 to 90  Pt will be  "independent with HEP        Plan  Patient would benefit from: skilled physical therapy  Planned modality interventions: cryotherapy, manual electrical stimulation and thermotherapy: hydrocollator packs  Planned therapy interventions: balance, functional ROM exercises, gait training, home exercise program, therapeutic exercise, therapeutic activities, stretching, strengthening, patient education, neuromuscular re-education, manual therapy, joint mobilization, IASTM and massage  Frequency: 1x week  Duration in visits: 5  Treatment plan discussed with: patient      Subjective Evaluation    History of Present Illness  Mechanism of injury: As of 24:\"  Onset/Mechanism: Pain started this past Summer without an injury.  Location: Inside the knee.  Radiation: Denies.  Provocative: Walking.  Severity: Painful.  Associated Symptoms: Locking when walking.  Treatment so far: No recent treatment.\"    - pain comes and goes  - notices some increased discomfort within a few minutes  - pain isnt there all the time walking  - at work if I do the stairs 2-3x/day the knee can start to hurt so then I just take the elevator  - pain located in the anterior R knee  - no numbness and tingling  - sleep unaffected  - occasionally can get leg spasms  - - history  - years ago feel onto the knee  Patient Goals  Patient goals for therapy: decreased pain, increased motion and increased strength  Patient goal: get some exercises so I can keep the leg in shape  Pain  Current pain ratin  At best pain ratin  At worst pain rating: 3  Location: R anterior knee  Relieving factors: rest  Aggravating factors: walking, stair climbing and lifting  Progression: no change    Social Support  Steps to enter house: yes  Stairs in house: yes   Lives in: multiple-level home    Employment status: working  Treatments  No previous or current treatments      Objective     Tenderness     Right Knee   Tenderness in the lateral joint line, " "patellar tendon and quadriceps tendon.     Active Range of Motion   Left Knee   Flexion: 115 degrees   Extension: 0 degrees     Right Knee   Flexion: 120 degrees   Extensor lag: -3 degrees     Strength/Myotome Testing     Left Hip   Planes of Motion   Flexion: 4    Right Hip   Planes of Motion   Flexion: 4    Left Knee   Flexion: 5  Extension: 5    Right Knee   Flexion: 5  Extension: 5    Left Ankle/Foot   Dorsiflexion: 5  Plantar flexion: 5    Right Ankle/Foot   Dorsiflexion: 5  Plantar flexion: 5    Tests     Right Knee   Positive patellar compression.   Negative anterior drawer, valgus stress test at 30 degrees and varus stress test at 30 degrees.              Precautions:   Past Medical History:   Diagnosis Date    Bronchitis 7/3/2019    Diabetes mellitus (HCC)     Hyperlipidemia     Hypertension     Syncope 5/3/2022    Vasovagal syncope 6/8/2020     HEP Code= [id number]       Diagnosis:  R knee OA   Precautions:  T2DM   Comparable signs Prolonged walking   Primary Impairments: Hip strength, knee extension ROM   Patient Goals Get exercises to maintain the knee function   Date 2/5        Manual Therapy                                                                              Exercise Diary                Therapeutic Exercise               Seated hamstring S 20\"2x             Calf stretch 20\"2x            3 way hip             Step up and over. lateral             Leg press             Heel slides 5\"10x                             Neuromuscular Re-education               SLR 3\"10x            SAQ 3\"10x                                                                                              Therapeutic Activities                                                                                             Modalities                                     "

## 2024-02-05 ENCOUNTER — EVALUATION (OUTPATIENT)
Dept: PHYSICAL THERAPY | Facility: CLINIC | Age: 64
End: 2024-02-05
Payer: MEDICARE

## 2024-02-05 DIAGNOSIS — M17.11 PRIMARY OSTEOARTHRITIS OF RIGHT KNEE: Primary | ICD-10-CM

## 2024-02-05 PROCEDURE — 97161 PT EVAL LOW COMPLEX 20 MIN: CPT

## 2024-02-05 PROCEDURE — 97110 THERAPEUTIC EXERCISES: CPT

## 2024-02-08 ENCOUNTER — TELEPHONE (OUTPATIENT)
Dept: OBGYN CLINIC | Facility: MEDICAL CENTER | Age: 64
End: 2024-02-08

## 2024-02-13 ENCOUNTER — APPOINTMENT (OUTPATIENT)
Dept: PHYSICAL THERAPY | Facility: CLINIC | Age: 64
End: 2024-02-13
Payer: MEDICARE

## 2024-02-16 ENCOUNTER — OFFICE VISIT (OUTPATIENT)
Dept: PHYSICAL THERAPY | Facility: CLINIC | Age: 64
End: 2024-02-16
Payer: MEDICARE

## 2024-02-16 DIAGNOSIS — M17.11 PRIMARY OSTEOARTHRITIS OF RIGHT KNEE: Primary | ICD-10-CM

## 2024-02-16 PROCEDURE — 97110 THERAPEUTIC EXERCISES: CPT

## 2024-02-16 NOTE — PROGRESS NOTES
"Daily Note     Today's date: 2024  Patient name: Elsa Garcia  : 1960  MRN: [id number]  Referring provider: Aime Richards DO  Dx:   Encounter Diagnosis     ICD-10-CM    1. Primary osteoarthritis of right knee  M17.11           Start Time: 0930  Stop Time: 1005  Total time in clinic (min): 35 minutes    Subjective: I had some increased knee pain and ankle pain after doing the exercises. I am feeling better today though about a 2/10.       Objective: See treatment diary below      Assessment: Tolerated treatment well. Pt was challenged by hip strength on the R compared to the L. Reviewed pts HEP to ensure proper form.  Patient demonstrated fatigue post treatment, exhibited good technique with therapeutic exercises, and would benefit from continued PT      Plan: Continue per plan of care.  Progress treatment as tolerated.       Precautions:   Past Medical History:   Diagnosis Date    Bronchitis 7/3/2019    Diabetes mellitus (HCC)     Hyperlipidemia     Hypertension     Syncope 5/3/2022    Vasovagal syncope 2020     HEP Code= [id number]       Diagnosis:  R knee OA   Precautions:  T2DM   Comparable signs Prolonged walking   Primary Impairments: Hip strength, knee extension ROM   Patient Goals Get exercises to maintain the knee function   Date        Manual Therapy                                                                              Exercise Diary                Therapeutic Exercise               Seated hamstring S 20\"2x  20\"3x           Calf stretch 20\"2x Standing  20\"3x           Hip abd, ext  3\"10 xea           Step up and over. lateral  6in  10x ea           Leg press             Heel slides 5\"10x  5\"20x           Heel toe raises  3\"20x ea       Supine march with TB  RTB  3\"13x ea       Bike- ROM   5 mins           Neuromuscular Re-education               SLR 3\"10x 2x10           SAQ 3\"10x  3\"10x                                                                                    "         Therapeutic Activities                                                                                             Modalities

## 2024-02-18 PROBLEM — Z00.00 MEDICARE ANNUAL WELLNESS VISIT, SUBSEQUENT: Status: RESOLVED | Noted: 2020-11-12 | Resolved: 2024-02-18

## 2024-02-20 ENCOUNTER — OFFICE VISIT (OUTPATIENT)
Dept: PHYSICAL THERAPY | Facility: CLINIC | Age: 64
End: 2024-02-20
Payer: MEDICARE

## 2024-02-20 DIAGNOSIS — M17.11 PRIMARY OSTEOARTHRITIS OF RIGHT KNEE: Primary | ICD-10-CM

## 2024-02-20 PROCEDURE — 97110 THERAPEUTIC EXERCISES: CPT

## 2024-02-20 PROCEDURE — 97112 NEUROMUSCULAR REEDUCATION: CPT

## 2024-02-20 NOTE — PROGRESS NOTES
"Daily Note     Today's date: 2024  Patient name: Elsa Garcia  : 1960  MRN: [id number]  Referring provider: Aime Richards DO  Dx:   Encounter Diagnosis     ICD-10-CM    1. Primary osteoarthritis of right knee  M17.11           Start Time: 1630  Stop Time: 1710  Total time in clinic (min): 40 minutes    Subjective: I felt great after the exercises last visit. A little pain today I did a lot of walking.      Objective: See treatment diary below      Assessment: Tolerated treatment well. Pt tolerated increased reps today with no report of increased pain. She is challenged by hip strength. Added in clamshells today and updated HEP.  Patient demonstrated fatigue post treatment, exhibited good technique with therapeutic exercises, and would benefit from continued PT      Plan: Continue per plan of care.  Progress treatment as tolerated.       Precautions:   Past Medical History:   Diagnosis Date    Bronchitis 7/3/2019    Diabetes mellitus (HCC)     Hyperlipidemia     Hypertension     Syncope 5/3/2022    Vasovagal syncope 2020     HEP Code= [id number]       Diagnosis:  R knee OA   Precautions:  T2DM   Comparable signs Prolonged walking   Primary Impairments: Hip strength, knee extension ROM   Patient Goals Get exercises to maintain the knee function   Date       Manual Therapy                                                                              Exercise Diary                Therapeutic Exercise               Seated hamstring S 20\"2x  20\"3x  20\"3x         Calf stretch 20\"2x Standing  20\"3x  Standing  20\"3x         Hip abd, ext  3\"10 xea  3\"10x ea         Step up and over. lateral  6in  10x ea  6in  10x ea         Leg press             Heel slides 5\"10x  5\"20x  5\"20x         Heel toe raises  3\"20x ea 3\"20x ea      clamshells   RTB  3\"15x      Supine march with TB  RTB  3\"13x ea RTB  3\"15x      Bike- ROM   5 mins  5 mins         Neuromuscular Re-education               SLR 3\"10x " "2x10  2x10         SAQ 3\"10x  3\"10x  3\"15x                                                                                          Therapeutic Activities                                                                                             Modalities                                       "

## 2024-02-21 PROBLEM — Z12.11 SCREENING FOR COLON CANCER: Status: RESOLVED | Noted: 2019-02-26 | Resolved: 2024-02-21

## 2024-02-27 ENCOUNTER — OFFICE VISIT (OUTPATIENT)
Dept: PHYSICAL THERAPY | Facility: CLINIC | Age: 64
End: 2024-02-27
Payer: MEDICARE

## 2024-02-27 DIAGNOSIS — M17.11 PRIMARY OSTEOARTHRITIS OF RIGHT KNEE: Primary | ICD-10-CM

## 2024-02-27 PROCEDURE — 97110 THERAPEUTIC EXERCISES: CPT

## 2024-02-27 PROCEDURE — 97112 NEUROMUSCULAR REEDUCATION: CPT

## 2024-02-27 NOTE — PROGRESS NOTES
"Daily Note     Today's date: 2024  Patient name: Elsa Garcia  : 1960  MRN: [id number]  Referring provider: Aime Richards DO  Dx:   Encounter Diagnosis     ICD-10-CM    1. Primary osteoarthritis of right knee  M17.11           Start Time: 1630  Stop Time: 1715  Total time in clinic (min): 45 minutes    Subjective: I think I slept wrong last night I woke up very achey and then I did my 15 laps around the gym and the knee was bothering me. The exercises are getting easier and seem to be helping.      Objective: See treatment diary below      Assessment: Tolerated treatment well. Progressed resistance and reps today. Pt challenged by muscle fatigue. No repoprt of increased pain following the session.  Patient demonstrated fatigue post treatment, exhibited good technique with therapeutic exercises, and would benefit from continued PT      Plan: Continue per plan of care.  Progress treatment as tolerated.       Precautions:   Past Medical History:   Diagnosis Date    Bronchitis 7/3/2019    Diabetes mellitus (HCC)     Hyperlipidemia     Hypertension     Syncope 5/3/2022    Vasovagal syncope 2020     HEP Code= [email or web address]       Diagnosis:  R knee OA   Precautions:  T2DM   Comparable signs Prolonged walking   Primary Impairments: Hip strength, knee extension ROM   Patient Goals Get exercises to maintain the knee function   Date      Manual Therapy                                                                              Exercise Diary                Therapeutic Exercise               Seated hamstring S 20\"2x  20\"3x  20\"3x  20\"3x        Calf stretch 20\"2x Standing  20\"3x  Standing  20\"3x Standing  20\"3x       Hip abd, ext  3\"10 xea  3\"10x ea  3\"15x ea       Step up and over. lateral  6in  10x ea  6in  10x ea  6in  15x ea       Leg press      seat 5  60lb  1x10  1x5       Heel slides 5\"10x  5\"20x  5\"20x  5\"20x       Heel toe raises  3\"20x ea 3\"20x ea 3\"20x ea     clamshells   " "RTB  3\"15x RTB  3\"15x     Supine march with TB  RTB  3\"13x ea RTB  3\"15x Standing  RTB  3\"15x ea     Goldman walk outs    8lb  3x ea     Bike- ROM   5 mins  5 mins  5 mins       Neuromuscular Re-education               SLR 3\"10x 2x10  2x10  1lb  1x10  1x5       SAQ 3\"10x  3\"10x  3\"15x  2x10                                                                                        Therapeutic Activities                                                                                             Modalities                                         "

## 2024-03-04 NOTE — PROGRESS NOTES
"PT Re-Evaluation     Today's date: 3/5/2024  Patient name: Elsa Garcia  : 1960  MRN: [id number]  Referring provider: Aime Richards DO  Dx:   Encounter Diagnosis     ICD-10-CM    1. Primary osteoarthritis of right knee  M17.11             Start Time: 1715  Stop Time: 1750  Total time in clinic (min): 35 minutes    Assessment  Assessment details: Pt presents to PT today with chronic R knee pain and OA that started last summer. She has completed 5 OPPT visits and reports Improvements in her ability to perform tasks with less pain. Their symptoms include decreasing R knee pain and are increased when performing prolonged walking, bending, and stair negotiation. The pt presented with now normal ROM and strength in the R knee. She was educate don the importance of continuing her HEP and was given an updated HEP today. At this point she is appropriate for discharge at this time.       Impairments: activity intolerance and pain with function    Symptom irritability: lowUnderstanding of Dx/Px/POC: good   Prognosis: good  Prognosis details:       Goals  Short term goals   To be met in 5 visits    Pt will increase R knee flexion from 120deg to 125deg - MET  Pt will increase R knee extension from -3deg to 0deg - MET  Pt will improve LE strength from 4/5 to 5/5- MET  Pt will amb >30mins without increasing knee pain- PROGRESSING  Pt will report increased ease with stair negotiation- MET  Pt will have decreased pain from 3/10 to 0/10 with functional activities- PROGRESSING  Pt will improve FOTO score from 67 to 90- PROGRESSING  Pt will be independent with HEP- MET        Plan  Treatment plan discussed with: patient      Subjective Evaluation    History of Present Illness  Mechanism of injury: As of 24:\"  Onset/Mechanism: Pain started this past Summer without an injury.  Location: Inside the knee.  Radiation: Denies.  Provocative: Walking.  Severity: Painful.  Associated Symptoms: Locking when " "walking.  Treatment so far: No recent treatment.\"    Re-evaluation 3/5/24:    - pain comes and goes  - notices some increased discomfort within a few minutes  - pain isnt there all the time walking  - at work if I do the stairs 2-3x/day the knee can start to hurt so then I just take the elevator  - pain located in the anterior R knee  - no numbness and tingling  - sleep unaffected  - occasionally can get leg spasms  - - history  - years ago feel onto the knee    Re-evaluation 3/5/24  I can go longer durations before the aching starts. Walking is good. I havent tried walking further distances yet. I dont feel limited in much. No leg spasms. 85% improvement. Dont do stairs that often so that's okay. Lifting is okay.   Patient Goals  Patient goals for therapy: decreased pain, increased motion and increased strength  Patient goal: get some exercises so I can keep the leg in shape  Pain  Current pain ratin  At best pain ratin  At worst pain rating: 3  Location: R anterior knee  Relieving factors: rest  Progression: no change    Social Support  Steps to enter house: yes  Stairs in house: yes   Lives in: multiple-level home    Employment status: working  Treatments  No previous or current treatments      Objective     Tenderness     Right Knee   Tenderness in the medial joint line. No tenderness in the lateral joint line, patellar tendon and quadriceps tendon.     Active Range of Motion   Left Knee   Flexion: 115 degrees   Extension: 0 degrees     Right Knee   Flexion: 127 degrees   Extension: 0 degrees     Strength/Myotome Testing     Left Hip   Planes of Motion   Flexion: 4    Right Hip   Planes of Motion   Flexion: 4    Left Knee   Flexion: 5  Extension: 5    Right Knee   Flexion: 5  Extension: 5    Left Ankle/Foot   Dorsiflexion: 5  Plantar flexion: 5    Right Ankle/Foot   Dorsiflexion: 5  Plantar flexion: 5    Tests     Right Knee   Negative anterior drawer, patellar compression, valgus " "stress test at 30 degrees and varus stress test at 30 degrees.              Precautions:   Past Medical History:   Diagnosis Date    Bronchitis 7/3/2019    Diabetes mellitus (HCC)     Hyperlipidemia     Hypertension     Syncope 5/3/2022    Vasovagal syncope 6/8/2020     HEP Code= [id number]       Diagnosis:  R knee OA   Precautions:  T2DM   Comparable signs Prolonged walking   Primary Impairments: Hip strength, knee extension ROM   Patient Goals Get exercises to maintain the knee function   Date 2/5 2/16 2/20 2/27 3/5    Manual Therapy                                                                              Exercise Diary                Therapeutic Exercise               Re-evaluation     performed    Seated hamstring S 20\"2x  20\"3x  20\"3x  20\"3x        Calf stretch 20\"2x Standing  20\"3x  Standing  20\"3x Standing  20\"3x  standing  20\"3x     Hip abd, ext  3\"10 xea  3\"10x ea  3\"15x ea  RTB  3\"10x     Step up and over. lateral  6in  10x ea  6in  10x ea  6in  15x ea       Leg press      seat 5  60lb  1x10  1x5   seat 5  60lb  2x10     Heel slides 5\"10x  5\"20x  5\"20x  5\"20x       Heel toe raises  3\"20x ea 3\"20x ea 3\"20x ea Eccentric  3\"15x    clamshells   RTB  3\"15x RTB  3\"15x RTB  3\"20x    Supine march with TB  RTB  3\"13x ea RTB  3\"15x Standing  RTB  3\"15x ea Standing  RTB  3\"15x ea    Goldman walk outs    8lb  3x ea RTB  14ft   2x    Bike- ROM   5 mins  5 mins  5 mins       Neuromuscular Re-education               SLR 3\"10x 2x10  2x10  1lb  1x10  1x5  RTB  3\"15x     SAQ 3\"10x  3\"10x  3\"15x  2x10       LAQ        3\"10x                                                                         Therapeutic Activities                                                                                             Modalities                                     "

## 2024-03-05 ENCOUNTER — EVALUATION (OUTPATIENT)
Dept: PHYSICAL THERAPY | Facility: CLINIC | Age: 64
End: 2024-03-05
Payer: MEDICARE

## 2024-03-05 DIAGNOSIS — M17.11 PRIMARY OSTEOARTHRITIS OF RIGHT KNEE: Primary | ICD-10-CM

## 2024-03-05 PROCEDURE — 97110 THERAPEUTIC EXERCISES: CPT

## 2024-03-28 ENCOUNTER — OFFICE VISIT (OUTPATIENT)
Dept: OBGYN CLINIC | Facility: MEDICAL CENTER | Age: 64
End: 2024-03-28
Payer: MEDICARE

## 2024-03-28 VITALS
WEIGHT: 237 LBS | DIASTOLIC BLOOD PRESSURE: 83 MMHG | SYSTOLIC BLOOD PRESSURE: 122 MMHG | HEART RATE: 69 BPM | BODY MASS INDEX: 35.22 KG/M2

## 2024-03-28 DIAGNOSIS — M17.11 PRIMARY OSTEOARTHRITIS OF RIGHT KNEE: Primary | ICD-10-CM

## 2024-03-28 PROCEDURE — 99213 OFFICE O/P EST LOW 20 MIN: CPT | Performed by: PHYSICAL MEDICINE & REHABILITATION

## 2024-03-28 NOTE — PROGRESS NOTES
1. Primary osteoarthritis of right knee          No orders of the defined types were placed in this encounter.    Impression:  Patient is here in follow up of right knee pain likely secondary to Kellgren-Kurtis grade 2 osteoarthritis.  We discussed different treatment options including intra-articular steroid injection, anti-inflammatories and bracing.  Presents after going through physical therapy and is pain-free today.  Her pain gets to a 2 or 3 out of 10 at its worst.  She will continue with her home exercise program.  I will see her back if her pain ever worsens any.     Imaging Studies (I personally reviewed images in PACS and report):  Right knee x-rays most recent to this encounter reviewed.  These images show mild-moderate medial joint space narrowing with subchondral sclerosis.  No acute osseous abnormalities.  There is a sesamoid bone consistent with a fabella in the posterior knee.  These findings are consistent with Kellgren-Kurtis grade 2 osteoarthritis.    No follow-ups on file.    Patient is in agreement with the above plan.    HPI:  Elsa Garcia is a 63 y.o. female  who presents in follow up.  Here for   Chief Complaint   Patient presents with    Right Knee - Pain, Follow-up       Since last visit: See above.    Following history reviewed and updated:  Past Medical History:   Diagnosis Date    Bronchitis 7/3/2019    Diabetes mellitus (HCC)     Hyperlipidemia     Hypertension     Syncope 5/3/2022    Vasovagal syncope 6/8/2020     Past Surgical History:   Procedure Laterality Date    APPENDECTOMY      age 4     COLONOSCOPY      IR BIOPSY LIVER MASS  7/1/2020    TONSILLECTOMY      age 5      Social History   Social History     Substance and Sexual Activity   Alcohol Use Not Currently     Social History     Substance and Sexual Activity   Drug Use Never     Social History     Tobacco Use   Smoking Status Never    Passive exposure: Never   Smokeless Tobacco Never     Family History   Problem  Relation Age of Onset    Atrial fibrillation Mother     Diabetes Father     Heart disease Sister         ventricular tachycardia     No Known Allergies     Constitutional:  /83   Pulse 69   Wt 108 kg (237 lb)   BMI 35.22 kg/m²    General: NAD.  Eyes: Clear sclerae.  ENT: No inflammation, lesion, or mass of lips.  No tracheal deviation.  Musculoskeletal: As mentioned below.  Integumentary: No visible rashes or skin lesions.  Pulmonary/Chest: Effort normal. No respiratory distress.   Neuro: CN's grossly intact, OQUENDO.  Psych: Normal affect and judgement.  Vascular: WWP.    Right Knee Exam     Tenderness   The patient is experiencing tenderness in the medial joint line.    Range of Motion   Extension:  normal     Tests   Varus: negative Valgus: negative    Other   Erythema: absent  Scars: absent  Sensation: normal  Pulse: present  Swelling: none  Effusion: no effusion present             Procedures

## 2024-04-16 ENCOUNTER — TELEPHONE (OUTPATIENT)
Dept: CARDIOLOGY CLINIC | Facility: CLINIC | Age: 64
End: 2024-04-16

## 2024-04-16 NOTE — TELEPHONE ENCOUNTER
LMOM for patient to contact us to schedule their followup (patient is on recall list). Anyone may assist in scheduling

## 2024-04-22 ENCOUNTER — OFFICE VISIT (OUTPATIENT)
Dept: FAMILY MEDICINE CLINIC | Facility: CLINIC | Age: 64
End: 2024-04-22
Payer: MEDICARE

## 2024-04-22 VITALS
OXYGEN SATURATION: 97 % | RESPIRATION RATE: 18 BRPM | DIASTOLIC BLOOD PRESSURE: 70 MMHG | WEIGHT: 247 LBS | TEMPERATURE: 97.9 F | BODY MASS INDEX: 37.44 KG/M2 | HEART RATE: 70 BPM | SYSTOLIC BLOOD PRESSURE: 110 MMHG | HEIGHT: 68 IN

## 2024-04-22 DIAGNOSIS — E78.5 DYSLIPIDEMIA: ICD-10-CM

## 2024-04-22 DIAGNOSIS — E66.01 CLASS 2 SEVERE OBESITY DUE TO EXCESS CALORIES WITH SERIOUS COMORBIDITY AND BODY MASS INDEX (BMI) OF 36.0 TO 36.9 IN ADULT (HCC): ICD-10-CM

## 2024-04-22 DIAGNOSIS — E11.9 TYPE 2 DIABETES MELLITUS WITHOUT COMPLICATION, WITHOUT LONG-TERM CURRENT USE OF INSULIN (HCC): Primary | ICD-10-CM

## 2024-04-22 DIAGNOSIS — C7A.8 MESENTERY METASTASIS OF NEUROENDOCRINE TUMOR (HCC): ICD-10-CM

## 2024-04-22 DIAGNOSIS — C7B.8 MESENTERY METASTASIS OF NEUROENDOCRINE TUMOR (HCC): ICD-10-CM

## 2024-04-22 DIAGNOSIS — E11.9 CONTROLLED TYPE 2 DIABETES MELLITUS WITHOUT COMPLICATION, WITHOUT LONG-TERM CURRENT USE OF INSULIN (HCC): ICD-10-CM

## 2024-04-22 DIAGNOSIS — I10 ESSENTIAL HYPERTENSION: ICD-10-CM

## 2024-04-22 LAB — SL AMB POCT HEMOGLOBIN AIC: 7.5 (ref ?–6.5)

## 2024-04-22 PROCEDURE — 83036 HEMOGLOBIN GLYCOSYLATED A1C: CPT | Performed by: FAMILY MEDICINE

## 2024-04-22 PROCEDURE — 99214 OFFICE O/P EST MOD 30 MIN: CPT | Performed by: FAMILY MEDICINE

## 2024-04-22 NOTE — PROGRESS NOTES
Assessment/Plan:    1. Type 2 diabetes mellitus without complication, without long-term current use of insulin (HCC)  -     POCT hemoglobin A1c  -     Hemoglobin A1C; Future; Expected date: 10/19/2024  -     Ambulatory Referral to Ophthalmology; Future    2. Essential hypertension  Assessment & Plan:  Stable on current meds      3. Controlled type 2 diabetes mellitus without complication, without long-term current use of insulin (HCC)  Assessment & Plan:  No change  Improved numbers  Lab Results   Component Value Date    HGBA1C 7.5 (A) 04/22/2024       4. Mesentery metastasis of neuroendocrine tumor (HCC)  Assessment & Plan:  No changes  Stable  Not growing      5. Dyslipidemia  Assessment & Plan:  Check lipids      6. Class 2 severe obesity due to excess calories with serious comorbidity and body mass index (BMI) of 36.0 to 36.9 in adult (HCC)  Assessment & Plan:  Increase exercise              There are no Patient Instructions on file for this visit.    Return in 6 months (on 10/22/2024) for Diabetes follow-up, Recheck.    Subjective:      Patient ID: Elsa Garcia is a 63 y.o. female.    Chief Complaint   Patient presents with   • Follow-up     Patient being seen for 4 month follow up        Here for follow up  Didn't get headache  More frequent headaches  Last 2-3 minutes  No meds needed  Only getting 4-6 hours sleep        The following portions of the patient's history were reviewed and updated as appropriate: allergies, current medications, past family history, past medical history, past social history, past surgical history and problem list.    Review of Systems   Constitutional: Negative.    HENT: Negative.     Eyes: Negative.    Respiratory: Negative.     Cardiovascular: Negative.    Gastrointestinal: Negative.    Endocrine: Negative.    Genitourinary: Negative.    Musculoskeletal:  Positive for arthralgias.   Allergic/Immunologic: Negative.    Neurological:  Positive for headaches.   Hematological:  "Negative.    Psychiatric/Behavioral: Negative.           Current Outpatient Medications   Medication Sig Dispense Refill   • albuterol (PROVENTIL HFA,VENTOLIN HFA) 90 mcg/act inhaler INHALE 2 PUFFS EVERY 6 HOURS AS NEEDED FOR WHEEZING 18 g 0   • atorvastatin (LIPITOR) 40 mg tablet Take 1 tablet (40 mg total) by mouth daily 90 tablet 3   • Blood Glucose Monitoring Suppl (OneTouch Verio Reflect) w/Device KIT Check blood sugars twice daily. Please substitute with appropriate alternative as covered by patient's insurance. Dx: E11.65 1 kit 0   • Empagliflozin (Jardiance) 25 MG TABS TAKE 1 TABLET BY MOUTH EVERY DAY IN THE MORNING 30 tablet 5   • glucose blood (OneTouch Verio) test strip CHECK BLOOD SUGARS TWICE DAILY 100 strip 7   • Lancets (OneTouch Delica Plus [id number]) MISC CHECK BLOOD SUGARS TWICE DAILY 100 each 7   • lisinopril-hydrochlorothiazide (PRINZIDE,ZESTORETIC) 10-12.5 MG per tablet TAKE 1 TABLET BY MOUTH EVERY DAY 90 tablet 2   • metFORMIN (GLUCOPHAGE) 500 mg tablet TAKE 2 TABLETS BY MOUTH TWICE A DAY WITH FOOD 360 tablet 2   • Multiple Vitamins-Minerals (ONE-A-DAY 50 PLUS PO) Take by mouth       No current facility-administered medications for this visit.       Objective:    /70 (BP Location: Left arm, Patient Position: Sitting, Cuff Size: Large)   Pulse 70   Temp 97.9 °F (36.6 °C) (Tympanic)   Resp 18   Ht 5' 8\" (1.727 m)   Wt 112 kg (247 lb)   SpO2 97%   BMI 37.56 kg/m²        Physical Exam  Vitals and nursing note reviewed.   Constitutional:       Appearance: Normal appearance. She is well-developed.   HENT:      Head: Normocephalic and atraumatic.      Nose: Nose normal.   Eyes:      General: Lids are normal.      Extraocular Movements: Extraocular movements intact.      Conjunctiva/sclera: Conjunctivae normal.      Pupils: Pupils are equal, round, and reactive to light.   Cardiovascular:      Rate and Rhythm: Normal rate and regular rhythm.      Heart sounds: Normal heart sounds, S1 " normal and S2 normal.   Pulmonary:      Effort: Pulmonary effort is normal.      Breath sounds: Normal breath sounds.   Abdominal:      General: Bowel sounds are normal.      Palpations: Abdomen is soft.   Musculoskeletal:         General: Normal range of motion.      Cervical back: Normal range of motion and neck supple.   Skin:     General: Skin is warm and dry.   Neurological:      General: No focal deficit present.      Mental Status: She is alert and oriented to person, place, and time.      Deep Tendon Reflexes: Reflexes are normal and symmetric.   Psychiatric:         Mood and Affect: Mood normal.         Speech: Speech normal.         Behavior: Behavior normal.         Thought Content: Thought content normal.         Judgment: Judgment normal.                Marianne Mayes,

## 2024-06-03 DIAGNOSIS — E11.9 CONTROLLED TYPE 2 DIABETES MELLITUS WITHOUT COMPLICATION, WITHOUT LONG-TERM CURRENT USE OF INSULIN (HCC): ICD-10-CM

## 2024-06-03 DIAGNOSIS — I10 ESSENTIAL HYPERTENSION: ICD-10-CM

## 2024-06-03 RX ORDER — LISINOPRIL AND HYDROCHLOROTHIAZIDE 12.5; 1 MG/1; MG/1
TABLET ORAL
Qty: 30 TABLET | Refills: 5 | Status: SHIPPED | OUTPATIENT
Start: 2024-06-03

## 2024-06-06 ENCOUNTER — RA CDI HCC (OUTPATIENT)
Dept: OTHER | Facility: HOSPITAL | Age: 64
End: 2024-06-06

## 2024-06-07 DIAGNOSIS — E11.9 CONTROLLED TYPE 2 DIABETES MELLITUS WITHOUT COMPLICATION, WITHOUT LONG-TERM CURRENT USE OF INSULIN (HCC): ICD-10-CM

## 2024-06-07 RX ORDER — EMPAGLIFLOZIN 25 MG/1
TABLET, FILM COATED ORAL
Qty: 30 TABLET | Refills: 0 | Status: SHIPPED | OUTPATIENT
Start: 2024-06-07

## 2024-06-08 ENCOUNTER — APPOINTMENT (OUTPATIENT)
Dept: LAB | Facility: CLINIC | Age: 64
End: 2024-06-08
Payer: MEDICARE

## 2024-06-08 DIAGNOSIS — E78.5 DYSLIPIDEMIA: ICD-10-CM

## 2024-06-08 DIAGNOSIS — E11.9 TYPE 2 DIABETES MELLITUS WITHOUT COMPLICATION, WITHOUT LONG-TERM CURRENT USE OF INSULIN (HCC): ICD-10-CM

## 2024-06-08 DIAGNOSIS — I10 BENIGN HYPERTENSION: ICD-10-CM

## 2024-06-08 LAB
ALBUMIN SERPL BCP-MCNC: 3.9 G/DL (ref 3.5–5)
ALP SERPL-CCNC: 75 U/L (ref 34–104)
ALT SERPL W P-5'-P-CCNC: 19 U/L (ref 7–52)
ANION GAP SERPL CALCULATED.3IONS-SCNC: 7 MMOL/L (ref 4–13)
AST SERPL W P-5'-P-CCNC: 18 U/L (ref 13–39)
BILIRUB SERPL-MCNC: 1.15 MG/DL (ref 0.2–1)
BUN SERPL-MCNC: 17 MG/DL (ref 5–25)
CALCIUM SERPL-MCNC: 8.9 MG/DL (ref 8.4–10.2)
CHLORIDE SERPL-SCNC: 103 MMOL/L (ref 96–108)
CHOLEST SERPL-MCNC: 134 MG/DL
CO2 SERPL-SCNC: 29 MMOL/L (ref 21–32)
CREAT SERPL-MCNC: 0.71 MG/DL (ref 0.6–1.3)
CREAT UR-MCNC: 61.6 MG/DL
GFR SERPL CREATININE-BSD FRML MDRD: 89 ML/MIN/1.73SQ M
GLUCOSE P FAST SERPL-MCNC: 146 MG/DL (ref 65–99)
HDLC SERPL-MCNC: 54 MG/DL
LDLC SERPL CALC-MCNC: 56 MG/DL (ref 0–100)
MICROALBUMIN UR-MCNC: <7 MG/L
MICROALBUMIN/CREAT 24H UR: <11 MG/G CREATININE (ref 0–30)
POTASSIUM SERPL-SCNC: 4.3 MMOL/L (ref 3.5–5.3)
PROT SERPL-MCNC: 6.9 G/DL (ref 6.4–8.4)
SODIUM SERPL-SCNC: 139 MMOL/L (ref 135–147)
TRIGL SERPL-MCNC: 118 MG/DL
TSH SERPL DL<=0.05 MIU/L-ACNC: 1.46 UIU/ML (ref 0.45–4.5)

## 2024-06-08 PROCEDURE — 80053 COMPREHEN METABOLIC PANEL: CPT

## 2024-06-08 PROCEDURE — 82043 UR ALBUMIN QUANTITATIVE: CPT

## 2024-06-08 PROCEDURE — 36415 COLL VENOUS BLD VENIPUNCTURE: CPT

## 2024-06-08 PROCEDURE — 84443 ASSAY THYROID STIM HORMONE: CPT

## 2024-06-08 PROCEDURE — 82570 ASSAY OF URINE CREATININE: CPT

## 2024-06-08 PROCEDURE — 80061 LIPID PANEL: CPT

## 2024-06-13 ENCOUNTER — OFFICE VISIT (OUTPATIENT)
Dept: FAMILY MEDICINE CLINIC | Facility: CLINIC | Age: 64
End: 2024-06-13
Payer: MEDICARE

## 2024-06-13 VITALS
HEART RATE: 69 BPM | WEIGHT: 240.8 LBS | HEIGHT: 68 IN | BODY MASS INDEX: 36.49 KG/M2 | TEMPERATURE: 96.7 F | DIASTOLIC BLOOD PRESSURE: 68 MMHG | RESPIRATION RATE: 16 BRPM | OXYGEN SATURATION: 97 % | SYSTOLIC BLOOD PRESSURE: 104 MMHG

## 2024-06-13 DIAGNOSIS — E66.01 CLASS 2 SEVERE OBESITY DUE TO EXCESS CALORIES WITH SERIOUS COMORBIDITY AND BODY MASS INDEX (BMI) OF 36.0 TO 36.9 IN ADULT (HCC): ICD-10-CM

## 2024-06-13 DIAGNOSIS — E78.5 DYSLIPIDEMIA: ICD-10-CM

## 2024-06-13 DIAGNOSIS — E11.9 CONTROLLED TYPE 2 DIABETES MELLITUS WITHOUT COMPLICATION, WITHOUT LONG-TERM CURRENT USE OF INSULIN (HCC): ICD-10-CM

## 2024-06-13 DIAGNOSIS — M25.552 BILATERAL HIP PAIN: ICD-10-CM

## 2024-06-13 DIAGNOSIS — M25.551 BILATERAL HIP PAIN: ICD-10-CM

## 2024-06-13 DIAGNOSIS — I10 ESSENTIAL HYPERTENSION: Primary | ICD-10-CM

## 2024-06-13 DIAGNOSIS — C7A.8 MALIGNANT NEUROENDOCRINE TUMOR OF LYMPH NODE (HCC): ICD-10-CM

## 2024-06-13 PROCEDURE — G2211 COMPLEX E/M VISIT ADD ON: HCPCS | Performed by: FAMILY MEDICINE

## 2024-06-13 PROCEDURE — 99214 OFFICE O/P EST MOD 30 MIN: CPT | Performed by: FAMILY MEDICINE

## 2024-06-13 NOTE — PROGRESS NOTES
"Ambulatory Visit  Name: Elsa Garcia      : 1960      MRN: [id number]  Encounter Provider: Marianne Mayes DO  Encounter Date: 2024   Encounter department: [facility]    Assessment & Plan   1. Essential hypertension  Assessment & Plan:  Stable on current meds  2. Controlled type 2 diabetes mellitus without complication, without long-term current use of insulin ([facility])  Assessment & Plan:  Check a1c  Lab Results   Component Value Date    HGBA1C 7.5 (A) 2024     Orders:  -     Hemoglobin A1C; Future; Expected date: 2024  -     Ambulatory Referral to Ophthalmology; Future  -     Empagliflozin (Jardiance) 25 MG TABS; Take 1 tablet (25 mg total) by mouth every morning  3. 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 Class 2 severe obesity due to excess calories with serious comorbidity and body mass index (BMI) of 36.0 to 36.9 in adult ([facility])  Assessment & Plan:  Has lost weight 7 pounds  5. Dyslipidemia  Assessment & Plan:  On lipids  6. Bilateral hip pain  -     XR hips bilateral 2 vw w pelvis if performed; Future; Expected date: 2024       History of Present Illness     Here for follow up  Labs reviewed  Overall feeling good  Has lost 7 pounds  Hip feel achey        Review of Systems   Constitutional: Negative.    HENT: Negative.     Eyes: Negative.    Respiratory: Negative.     Cardiovascular: Negative.    Gastrointestinal: Negative.    Endocrine: Negative.    Genitourinary: Negative.    Musculoskeletal:  Positive for arthralgias.     Medical History Reviewed by provider this encounter:       Objective     /68 (BP Location: Left arm, Patient Position: Sitting, Cuff Size: Large)   Pulse 69   Temp (!) 96.7 °F (35.9 °C) (Tympanic)   Resp 16   Ht 5' 8\" (1.727 m)   Wt 109 kg (240 lb 12.8 oz)   SpO2 97%   BMI 36.61 kg/m²     Physical Exam  Vitals and nursing note reviewed.   Constitutional:      "  Appearance: Normal appearance. She is well-developed.   HENT:      Head: Normocephalic and atraumatic.      Right Ear: External ear normal.      Left Ear: External ear normal.      Nose: Nose normal.   Eyes:      Extraocular Movements: Extraocular movements intact.      Conjunctiva/sclera: Conjunctivae normal.      Pupils: Pupils are equal, round, and reactive to light.   Cardiovascular:      Rate and Rhythm: Normal rate and regular rhythm.      Heart sounds: Normal heart sounds.   Pulmonary:      Effort: Pulmonary effort is normal.      Breath sounds: Normal breath sounds.   Abdominal:      General: Abdomen is flat. Bowel sounds are normal.      Palpations: Abdomen is soft.   Musculoskeletal:         General: Normal range of motion.      Cervical back: Normal range of motion and neck supple.   Skin:     General: Skin is warm and dry.      Capillary Refill: Capillary refill takes less than 2 seconds.   Neurological:      General: No focal deficit present.      Mental Status: She is alert and oriented to person, place, and time.   Psychiatric:         Mood and Affect: Mood normal.         Behavior: Behavior normal.         Thought Content: Thought content normal.         Judgment: Judgment normal.       Administrative Statements   I have spent a total time of 15 minutes on 06/13/24 In caring for this patient including Prognosis.

## 2024-06-29 DIAGNOSIS — E11.9 CONTROLLED TYPE 2 DIABETES MELLITUS WITHOUT COMPLICATION, WITHOUT LONG-TERM CURRENT USE OF INSULIN (HCC): ICD-10-CM

## 2024-06-29 DIAGNOSIS — I10 ESSENTIAL HYPERTENSION: ICD-10-CM

## 2024-06-29 RX ORDER — LISINOPRIL AND HYDROCHLOROTHIAZIDE 12.5; 1 MG/1; MG/1
TABLET ORAL
Qty: 90 TABLET | Refills: 1 | Status: SHIPPED | OUTPATIENT
Start: 2024-06-29

## 2024-07-09 ENCOUNTER — NEW PATIENT (OUTPATIENT)
Dept: URBAN - METROPOLITAN AREA CLINIC 6 | Facility: CLINIC | Age: 64
End: 2024-07-09

## 2024-07-09 DIAGNOSIS — D49.2: ICD-10-CM

## 2024-07-09 DIAGNOSIS — E11.9: ICD-10-CM

## 2024-07-09 DIAGNOSIS — H52.13: ICD-10-CM

## 2024-07-09 LAB
LEFT EYE DIABETIC RETINOPATHY: NORMAL
RIGHT EYE DIABETIC RETINOPATHY: NORMAL

## 2024-07-09 PROCEDURE — 92015 DETERMINE REFRACTIVE STATE: CPT

## 2024-07-09 PROCEDURE — 99203 OFFICE O/P NEW LOW 30 MIN: CPT

## 2024-07-09 ASSESSMENT — VISUAL ACUITY
OU_CC: J3
OS_CC: 20/50
OS_PH: 20/40
OD_CC: 20/40-2

## 2024-07-09 ASSESSMENT — TONOMETRY
OD_IOP_MMHG: 13
OS_IOP_MMHG: 13

## 2024-08-05 ENCOUNTER — CONSULTATION (OUTPATIENT)
Dept: URBAN - METROPOLITAN AREA CLINIC 6 | Facility: CLINIC | Age: 64
End: 2024-08-05

## 2024-08-05 DIAGNOSIS — D49.2: ICD-10-CM

## 2024-08-05 DIAGNOSIS — E11.9: ICD-10-CM

## 2024-08-05 PROCEDURE — 92012 INTRM OPH EXAM EST PATIENT: CPT | Mod: 25

## 2024-08-05 PROCEDURE — 67840 REMOVE EYELID LESION: CPT

## 2024-08-05 ASSESSMENT — TONOMETRY
OD_IOP_MMHG: 15
OS_IOP_MMHG: 14

## 2024-08-05 ASSESSMENT — VISUAL ACUITY
OD_CC: 20/25
OS_CC: 20/40-1
OS_PH: 20/30+1

## 2024-09-02 DIAGNOSIS — E78.5 DYSLIPIDEMIA: ICD-10-CM

## 2024-09-03 RX ORDER — ATORVASTATIN CALCIUM 40 MG/1
40 TABLET, FILM COATED ORAL DAILY
Qty: 90 TABLET | Refills: 1 | Status: SHIPPED | OUTPATIENT
Start: 2024-09-03

## 2024-09-12 ENCOUNTER — FOLLOW UP (OUTPATIENT)
Dept: URBAN - METROPOLITAN AREA CLINIC 6 | Facility: CLINIC | Age: 64
End: 2024-09-12

## 2024-09-12 DIAGNOSIS — H25.13: ICD-10-CM

## 2024-09-12 PROCEDURE — 92012 INTRM OPH EXAM EST PATIENT: CPT

## 2024-09-12 ASSESSMENT — TONOMETRY
OS_IOP_MMHG: 11
OD_IOP_MMHG: 13

## 2024-09-12 ASSESSMENT — VISUAL ACUITY
OD_CC: 20/30-2
OS_CC: 20/40+2
OU_CC: J1+

## 2024-11-26 DIAGNOSIS — I10 ESSENTIAL HYPERTENSION: ICD-10-CM

## 2024-11-27 RX ORDER — LISINOPRIL AND HYDROCHLOROTHIAZIDE 10; 12.5 MG/1; MG/1
1 TABLET ORAL DAILY
Qty: 30 TABLET | Refills: 5 | Status: SHIPPED | OUTPATIENT
Start: 2024-11-27

## 2024-12-19 DIAGNOSIS — I10 ESSENTIAL HYPERTENSION: ICD-10-CM

## 2024-12-20 RX ORDER — LISINOPRIL AND HYDROCHLOROTHIAZIDE 10; 12.5 MG/1; MG/1
1 TABLET ORAL DAILY
Qty: 90 TABLET | Refills: 1 | Status: SHIPPED | OUTPATIENT
Start: 2024-12-20

## 2024-12-23 DIAGNOSIS — E11.9 CONTROLLED TYPE 2 DIABETES MELLITUS WITHOUT COMPLICATION, WITHOUT LONG-TERM CURRENT USE OF INSULIN (HCC): ICD-10-CM

## 2025-01-06 ENCOUNTER — OFFICE VISIT (OUTPATIENT)
Dept: FAMILY MEDICINE CLINIC | Facility: CLINIC | Age: 65
End: 2025-01-06
Payer: MEDICARE

## 2025-01-06 VITALS
HEIGHT: 68 IN | WEIGHT: 233.2 LBS | RESPIRATION RATE: 18 BRPM | HEART RATE: 88 BPM | DIASTOLIC BLOOD PRESSURE: 66 MMHG | OXYGEN SATURATION: 95 % | BODY MASS INDEX: 35.34 KG/M2 | SYSTOLIC BLOOD PRESSURE: 110 MMHG | TEMPERATURE: 99.3 F

## 2025-01-06 DIAGNOSIS — E66.01 CLASS 2 SEVERE OBESITY DUE TO EXCESS CALORIES WITH SERIOUS COMORBIDITY AND BODY MASS INDEX (BMI) OF 36.0 TO 36.9 IN ADULT (HCC): ICD-10-CM

## 2025-01-06 DIAGNOSIS — E66.812 CLASS 2 SEVERE OBESITY DUE TO EXCESS CALORIES WITH SERIOUS COMORBIDITY AND BODY MASS INDEX (BMI) OF 36.0 TO 36.9 IN ADULT (HCC): ICD-10-CM

## 2025-01-06 DIAGNOSIS — E11.9 CONTROLLED TYPE 2 DIABETES MELLITUS WITHOUT COMPLICATION, WITHOUT LONG-TERM CURRENT USE OF INSULIN (HCC): ICD-10-CM

## 2025-01-06 DIAGNOSIS — I10 ESSENTIAL HYPERTENSION: ICD-10-CM

## 2025-01-06 DIAGNOSIS — C7A.8 MESENTERY METASTASIS OF NEUROENDOCRINE TUMOR (HCC): ICD-10-CM

## 2025-01-06 DIAGNOSIS — H10.33 ACUTE BACTERIAL CONJUNCTIVITIS OF BOTH EYES: ICD-10-CM

## 2025-01-06 DIAGNOSIS — C7B.8 MESENTERY METASTASIS OF NEUROENDOCRINE TUMOR (HCC): ICD-10-CM

## 2025-01-06 DIAGNOSIS — E78.5 DYSLIPIDEMIA: ICD-10-CM

## 2025-01-06 DIAGNOSIS — B34.9 VIRAL INFECTION, UNSPECIFIED: Primary | ICD-10-CM

## 2025-01-06 DIAGNOSIS — J10.1 INFLUENZA A: ICD-10-CM

## 2025-01-06 LAB
SARS-COV-2 AG UPPER RESP QL IA: NEGATIVE
SL AMB POCT RAPID FLU A: POSITIVE
SL AMB POCT RAPID FLU B: NEGATIVE
VALID CONTROL: NORMAL

## 2025-01-06 PROCEDURE — G2211 COMPLEX E/M VISIT ADD ON: HCPCS | Performed by: FAMILY MEDICINE

## 2025-01-06 PROCEDURE — 87811 SARS-COV-2 COVID19 W/OPTIC: CPT | Performed by: FAMILY MEDICINE

## 2025-01-06 PROCEDURE — 87804 INFLUENZA ASSAY W/OPTIC: CPT | Performed by: FAMILY MEDICINE

## 2025-01-06 PROCEDURE — 99214 OFFICE O/P EST MOD 30 MIN: CPT | Performed by: FAMILY MEDICINE

## 2025-01-06 RX ORDER — TOBRAMYCIN 3 MG/ML
2 SOLUTION/ DROPS OPHTHALMIC
Qty: 15 ML | Refills: 0 | Status: SHIPPED | OUTPATIENT
Start: 2025-01-06 | End: 2025-01-16

## 2025-01-06 NOTE — ASSESSMENT & PLAN NOTE
Recent Results (from the past 24 hours)   Poct Covid 19 Rapid Antigen Test    Collection Time: 01/06/25 10:31 AM   Result Value Ref Range    POCT SARS-CoV-2 Ag Negative Negative    VALID CONTROL Valid    POCT rapid flu A and B    Collection Time: 01/06/25 10:31 AM   Result Value Ref Range    RAPID FLU A Positive     RAPID FLU B Negative     Outside treatement window  Supportive care

## 2025-01-06 NOTE — ASSESSMENT & PLAN NOTE
Orders:  •  Lipid Panel with Direct LDL reflex; Future  •  TSH, 3rd generation with Free T4 reflex; Future

## 2025-01-06 NOTE — PROGRESS NOTES
Name: Elsa Garcia      : 1960      MRN: [id number]  Encounter Provider: Marianne Mayes DO  Encounter Date: 2025   Encounter department: [facility]    Assessment & Plan  Influenza A  Recent Results (from the past 24 hours)   Poct Covid 19 Rapid Antigen Test    Collection Time: 25 10:31 AM   Result Value Ref Range    POCT SARS-CoV-2 Ag Negative Negative    VALID CONTROL Valid    POCT rapid flu A and B    Collection Time: 25 10:31 AM   Result Value Ref Range    RAPID FLU A Positive     RAPID FLU B Negative     Outside treatement window  Supportive care       Viral infection, unspecified    Orders:  •  Poct Covid 19 Rapid Antigen Test  •  POCT rapid flu A and B    Mesentery metastasis of neuroendocrine tumor (HCC)  [facility]        Class 2 severe obesity due to excess calories with serious comorbidity and body mass index (BMI) of 36.0 to 36.9 in adult (HCC)         Controlled type 2 diabetes mellitus without complication, without long-term current use of insulin (HCC)    Lab Results   Component Value Date    HGBA1C 7.5 (A) 2024          Essential hypertension    Orders:  •  Comprehensive metabolic panel; Future  •  CBC and differential; Future    Dyslipidemia    Orders:  •  Lipid Panel with Direct LDL reflex; Future  •  TSH, 3rd generation with Free T4 reflex; Future    Acute bacterial conjunctivitis of both eyes    Orders:  •  tobramycin (TOBREX) 0.3 % SOLN; Administer 2 drops into the left eye every 4 (four) hours while awake      Assessment & Plan         History of Present Illness     History of Present Illness  Started vomiting   Son in law had gi bug  Started with cold   Left eye discharge  Didn't do covid   Test  No fever at home  Some green discharge  Taking robitussin   vicks       Review of Systems  Objective   /66 (BP Location: Left arm, Patient Position: Sitting, Cuff Size: Large)   Pulse 88   Temp 99.3 °F (37.4  "°C) (Tympanic)   Resp 18   Ht 5' 8\" (1.727 m)   Wt 106 kg (233 lb 3.2 oz)   SpO2 95%   BMI 35.46 kg/m²     Physical Exam    Physical Exam  Vitals and nursing note reviewed.   Constitutional:       Appearance: Normal appearance. She is well-developed.   HENT:      Head: Normocephalic and atraumatic.      Right Ear: External ear normal.      Left Ear: External ear normal.      Nose: Nose normal.   Eyes:      Extraocular Movements: Extraocular movements intact.      Conjunctiva/sclera: Conjunctivae normal.      Pupils: Pupils are equal, round, and reactive to light.   Cardiovascular:      Rate and Rhythm: Normal rate and regular rhythm.      Heart sounds: Normal heart sounds.   Pulmonary:      Effort: Pulmonary effort is normal.      Breath sounds: Normal breath sounds.   Abdominal:      General: Bowel sounds are normal.      Palpations: Abdomen is soft.   Musculoskeletal:         General: Normal range of motion.      Cervical back: Normal range of motion and neck supple.   Skin:     General: Skin is warm and dry.      Capillary Refill: Capillary refill takes less than 2 seconds.   Neurological:      General: No focal deficit present.      Mental Status: She is alert and oriented to person, place, and time.   Psychiatric:         Mood and Affect: Mood normal.         Behavior: Behavior normal.         Thought Content: Thought content normal.         Judgment: Judgment normal.         "

## 2025-01-16 ENCOUNTER — OFFICE VISIT (OUTPATIENT)
Dept: FAMILY MEDICINE CLINIC | Facility: CLINIC | Age: 65
End: 2025-01-16
Payer: MEDICARE

## 2025-01-16 VITALS
OXYGEN SATURATION: 98 % | WEIGHT: 238 LBS | BODY MASS INDEX: 36.07 KG/M2 | RESPIRATION RATE: 18 BRPM | TEMPERATURE: 99 F | HEIGHT: 68 IN | DIASTOLIC BLOOD PRESSURE: 70 MMHG | SYSTOLIC BLOOD PRESSURE: 118 MMHG | HEART RATE: 71 BPM

## 2025-01-16 DIAGNOSIS — J10.1 INFLUENZA A: Primary | ICD-10-CM

## 2025-01-16 DIAGNOSIS — R05.8 POST-VIRAL COUGH SYNDROME: ICD-10-CM

## 2025-01-16 PROCEDURE — 99213 OFFICE O/P EST LOW 20 MIN: CPT | Performed by: FAMILY MEDICINE

## 2025-01-16 RX ORDER — PROMETHAZINE HYDROCHLORIDE 6.25 MG/5ML
12.5 SYRUP ORAL
Qty: 240 ML | Refills: 0 | Status: SHIPPED | OUTPATIENT
Start: 2025-01-16

## 2025-01-16 RX ORDER — ALBUTEROL SULFATE 90 UG/1
2 INHALANT RESPIRATORY (INHALATION) EVERY 6 HOURS PRN
Qty: 6.7 G | Refills: 0 | Status: SHIPPED | OUTPATIENT
Start: 2025-01-16

## 2025-01-16 RX ORDER — METHYLPREDNISOLONE 4 MG/1
TABLET ORAL
Qty: 21 EACH | Refills: 0 | Status: SHIPPED | OUTPATIENT
Start: 2025-01-16

## 2025-01-16 NOTE — ASSESSMENT & PLAN NOTE
Orders:  •  methylPREDNISolone 4 MG tablet therapy pack; Use as directed on package  •  albuterol (Ventolin HFA) 90 mcg/act inhaler; Inhale 2 puffs every 6 (six) hours as needed for wheezing

## 2025-01-16 NOTE — ASSESSMENT & PLAN NOTE
Orders:  •  methylPREDNISolone 4 MG tablet therapy pack; Use as directed on package  •  albuterol (Ventolin HFA) 90 mcg/act inhaler; Inhale 2 puffs every 6 (six) hours as needed for wheezing  •  promethazine (PHENERGAN) 12.5 mg/10 mL oral solution; Take 10 mL (12.5 mg total) by mouth daily at bedtime as needed (cough)

## 2025-01-16 NOTE — PROGRESS NOTES
"Name: Elsa Garcia      : 1960      MRN: [id number]  Encounter Provider: Marianne Mayes DO  Encounter Date: 2025   Encounter department: [facility]    Assessment & Plan  Influenza A    Orders:  •  methylPREDNISolone 4 MG tablet therapy pack; Use as directed on package  •  albuterol (Ventolin HFA) 90 mcg/act inhaler; Inhale 2 puffs every 6 (six) hours as needed for wheezing    Post-viral cough syndrome    Orders:  •  methylPREDNISolone 4 MG tablet therapy pack; Use as directed on package  •  albuterol (Ventolin HFA) 90 mcg/act inhaler; Inhale 2 puffs every 6 (six) hours as needed for wheezing  •  promethazine (PHENERGAN) 12.5 mg/10 mL oral solution; Take 10 mL (12.5 mg total) by mouth daily at bedtime as needed (cough)      Assessment & Plan         History of Present Illness     History of Present Illness  Had flu a  Feeling better except cough remaining  Nonstop  Worse at night  Unable to sleep     Review of Systems   Constitutional:  Positive for fatigue.   HENT: Negative.     Eyes: Negative.    Respiratory:  Positive for cough.    Cardiovascular: Negative.    Gastrointestinal: Negative.    Endocrine: Negative.    Genitourinary: Negative.    Musculoskeletal: Negative.    Allergic/Immunologic: Negative.    Neurological: Negative.    Hematological: Negative.    Psychiatric/Behavioral: Negative.       Objective   /70 (BP Location: Left arm, Patient Position: Sitting, Cuff Size: Large)   Pulse 71   Temp 99 °F (37.2 °C) (Tympanic)   Resp 18   Ht 5' 8\" (1.727 m)   Wt 108 kg (238 lb)   SpO2 98%   BMI 36.19 kg/m²     Physical Exam    Physical Exam  Vitals and nursing note reviewed.   Constitutional:       Appearance: Normal appearance. She is well-developed.   HENT:      Head: Normocephalic and atraumatic.      Right Ear: External ear normal.      Left Ear: External ear normal.      Nose: Nose normal.   Eyes:      Extraocular Movements: Extraocular movements intact. "      Conjunctiva/sclera: Conjunctivae normal.      Pupils: Pupils are equal, round, and reactive to light.   Cardiovascular:      Rate and Rhythm: Normal rate and regular rhythm.      Heart sounds: Normal heart sounds.   Pulmonary:      Effort: Pulmonary effort is normal.      Breath sounds: Normal breath sounds.   Abdominal:      General: Bowel sounds are normal.      Palpations: Abdomen is soft.   Musculoskeletal:         General: Normal range of motion.      Cervical back: Normal range of motion and neck supple.   Skin:     General: Skin is warm and dry.      Capillary Refill: Capillary refill takes less than 2 seconds.   Neurological:      General: No focal deficit present.      Mental Status: She is alert and oriented to person, place, and time.   Psychiatric:         Mood and Affect: Mood normal.         Behavior: Behavior normal.         Thought Content: Thought content normal.         Judgment: Judgment normal.

## 2025-01-17 DIAGNOSIS — E11.9 CONTROLLED TYPE 2 DIABETES MELLITUS WITHOUT COMPLICATION, WITHOUT LONG-TERM CURRENT USE OF INSULIN (HCC): ICD-10-CM

## 2025-02-05 PROBLEM — J10.1 INFLUENZA A: Status: RESOLVED | Noted: 2025-01-06 | Resolved: 2025-02-05

## 2025-02-12 ENCOUNTER — RA CDI HCC (OUTPATIENT)
Dept: OTHER | Facility: HOSPITAL | Age: 65
End: 2025-02-12

## 2025-02-18 ENCOUNTER — TELEPHONE (OUTPATIENT)
Dept: FAMILY MEDICINE CLINIC | Facility: CLINIC | Age: 65
End: 2025-02-18

## 2025-02-18 NOTE — TELEPHONE ENCOUNTER
2/18 called patient but she was having trouble with reception and will call back.     Patient needs to r/s 2/20 apt provider had a meeting added to schedule, please put patient through to office.

## 2025-02-19 ENCOUNTER — OFFICE VISIT (OUTPATIENT)
Dept: FAMILY MEDICINE CLINIC | Facility: CLINIC | Age: 65
End: 2025-02-19
Payer: COMMERCIAL

## 2025-02-19 VITALS
SYSTOLIC BLOOD PRESSURE: 118 MMHG | HEIGHT: 68 IN | DIASTOLIC BLOOD PRESSURE: 72 MMHG | WEIGHT: 233 LBS | OXYGEN SATURATION: 98 % | RESPIRATION RATE: 18 BRPM | BODY MASS INDEX: 35.31 KG/M2 | HEART RATE: 68 BPM | TEMPERATURE: 98.9 F

## 2025-02-19 DIAGNOSIS — E11.9 CONTROLLED TYPE 2 DIABETES MELLITUS WITHOUT COMPLICATION, WITHOUT LONG-TERM CURRENT USE OF INSULIN (HCC): ICD-10-CM

## 2025-02-19 DIAGNOSIS — C7B.8 MESENTERY METASTASIS OF NEUROENDOCRINE TUMOR (HCC): ICD-10-CM

## 2025-02-19 DIAGNOSIS — E78.5 DYSLIPIDEMIA: ICD-10-CM

## 2025-02-19 DIAGNOSIS — C7A.8 MESENTERY METASTASIS OF NEUROENDOCRINE TUMOR (HCC): ICD-10-CM

## 2025-02-19 DIAGNOSIS — I10 ESSENTIAL HYPERTENSION: ICD-10-CM

## 2025-02-19 DIAGNOSIS — E11.9 TYPE 2 DIABETES MELLITUS WITHOUT COMPLICATION, WITHOUT LONG-TERM CURRENT USE OF INSULIN (HCC): Primary | ICD-10-CM

## 2025-02-19 DIAGNOSIS — E66.01 CLASS 2 SEVERE OBESITY DUE TO EXCESS CALORIES WITH SERIOUS COMORBIDITY AND BODY MASS INDEX (BMI) OF 35.0 TO 35.9 IN ADULT (HCC): ICD-10-CM

## 2025-02-19 DIAGNOSIS — E66.812 CLASS 2 SEVERE OBESITY DUE TO EXCESS CALORIES WITH SERIOUS COMORBIDITY AND BODY MASS INDEX (BMI) OF 35.0 TO 35.9 IN ADULT (HCC): ICD-10-CM

## 2025-02-19 DIAGNOSIS — C7A.8 MALIGNANT NEUROENDOCRINE TUMOR OF LYMPH NODE (HCC): ICD-10-CM

## 2025-02-19 LAB — SL AMB POCT HEMOGLOBIN AIC: 7.4 (ref ?–6.5)

## 2025-02-19 PROCEDURE — 83036 HEMOGLOBIN GLYCOSYLATED A1C: CPT | Performed by: FAMILY MEDICINE

## 2025-02-19 PROCEDURE — 99214 OFFICE O/P EST MOD 30 MIN: CPT | Performed by: FAMILY MEDICINE

## 2025-02-19 NOTE — PROGRESS NOTES
Diabetic Foot Exam    Patient's shoes and socks removed.    Right Foot/Ankle   Right Foot Inspection  Skin Exam: skin normal, dry skin, callus and callus. No warmth, no erythema, no maceration, no abnormal color, no pre-ulcer and no ulcer.     Toe Exam: ROM and strength within normal limits.     Sensory   Vibration: intact  Proprioception: intact  Monofilament testing: intact    Vascular  Capillary refills: < 3 seconds  The right DP pulse is 2+. The right PT pulse is 2+.     Left Foot/Ankle  Left Foot Inspection  Skin Exam: skin normal, dry skin and callus. No warmth, no erythema, no maceration, normal color, no pre-ulcer and no ulcer.     Toe Exam: ROM and strength within normal limits.     Sensory   Vibration: intact  Proprioception: intact  Monofilament testing: intact    Vascular  Capillary refills: < 3 seconds  The left DP pulse is 2+. The left PT pulse is 2+.     Assign Risk Category  No deformity present  Loss of protective sensation  No weak pulses  Risk: 1      Name: Elsa Garcia      : 1960      MRN: [id number]  Encounter Provider: Marianne Mayes DO  Encounter Date: 2025   Encounter department: [facility]    Assessment & Plan  Type 2 diabetes mellitus without complication, without long-term current use of insulin (HCC)    Lab Results   Component Value Date    HGBA1C 7.4 (A) 2025     Orders:  •  POCT hemoglobin A1c  •  Albumin / creatinine urine ratio; Future  •  Hemoglobin A1C; Future    Essential hypertension  Stable on current meds       Controlled type 2 diabetes mellitus without complication, without long-term current use of insulin (HCC)    Lab Results   Component Value Date    HGBA1C 7.4 (A) 2025          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    HENT: Negative.     Eyes: Negative.    Respiratory: Negative.     Cardiovascular: Negative.    Gastrointestinal: Negative.    Endocrine: Negative.    Genitourinary: Negative.    Musculoskeletal: Negative.    Allergic/Immunologic: Negative.    Neurological: Negative.    Hematological: Negative.    Psychiatric/Behavioral: Negative.       Objective   /72 (BP Location: Left arm, Patient Position: Sitting, Cuff Size: Large)   Pulse 68   Temp 98.9 °F (37.2 °C) (Tympanic)   Resp 18   Ht 5' 8\" (1.727 m)   Wt 106 kg (233 lb)   SpO2 98%   BMI 35.43 kg/m²     Physical Exam    Physical Exam  Vitals and nursing note reviewed.   Constitutional:       Appearance: Normal appearance. She is well-developed.   HENT:      Head: Normocephalic and atraumatic.      Right Ear: External ear normal.      Left Ear: External ear normal.      Nose: Nose normal.   Eyes:      Extraocular Movements: Extraocular movements intact.      Conjunctiva/sclera: Conjunctivae normal.      Pupils: Pupils are equal, round, and reactive to light.   Cardiovascular:      Rate and Rhythm: Normal rate and regular rhythm.      Pulses: no weak pulses.           Dorsalis pedis pulses are 2+ on the right side and 2+ on the left side.        Posterior tibial pulses are 2+ on the right side and 2+ on the left side.      Heart sounds: Normal heart sounds.   Pulmonary:      Effort: Pulmonary effort is normal.      Breath sounds: Normal breath sounds.   Abdominal:      General: Abdomen is flat. Bowel sounds are normal.      Palpations: Abdomen is soft.   Musculoskeletal:         General: Normal range of motion.      Cervical back: Normal range of motion and neck supple.        " Feet:    Feet:      Right foot:      Skin integrity: Callus and dry skin present. No ulcer, skin breakdown, erythema or warmth.      Left foot:      Skin integrity: Callus and dry skin present. No ulcer, skin breakdown, erythema or warmth.   Skin:     General: Skin is warm and dry.      Capillary Refill: Capillary refill takes less than 2 seconds.   Neurological:      General: No focal deficit present.      Mental Status: She is alert and oriented to person, place, and time.   Psychiatric:         Mood and Affect: Mood normal.         Behavior: Behavior normal.         Thought Content: Thought content normal.         Judgment: Judgment normal.

## 2025-02-20 RX ORDER — ATORVASTATIN CALCIUM 40 MG/1
40 TABLET, FILM COATED ORAL DAILY
Qty: 30 TABLET | Refills: 5 | Status: SHIPPED | OUTPATIENT
Start: 2025-02-20

## 2025-03-17 DIAGNOSIS — E78.5 DYSLIPIDEMIA: ICD-10-CM

## 2025-03-18 RX ORDER — ATORVASTATIN CALCIUM 40 MG/1
40 TABLET, FILM COATED ORAL DAILY
Qty: 90 TABLET | Refills: 2 | Status: SHIPPED | OUTPATIENT
Start: 2025-03-18

## 2025-04-23 ENCOUNTER — DOCUMENTATION (OUTPATIENT)
Dept: ADMINISTRATIVE | Facility: OTHER | Age: 65
End: 2025-04-23

## 2025-04-23 NOTE — PROGRESS NOTES
04/23/25 8:07 AM    Annual Wellness Visit outreach is not required, the practice has scheduled the AWV.    Thank you.  ASHER AL MA  [facility]

## 2025-04-25 ENCOUNTER — PROBLEM (OUTPATIENT)
Dept: URBAN - METROPOLITAN AREA CLINIC 6 | Facility: CLINIC | Age: 65
End: 2025-04-25

## 2025-04-25 DIAGNOSIS — H00.12: ICD-10-CM

## 2025-04-25 PROCEDURE — 92012 INTRM OPH EXAM EST PATIENT: CPT

## 2025-04-25 ASSESSMENT — TONOMETRY
OD_IOP_MMHG: 8
OS_IOP_MMHG: 9

## 2025-04-25 ASSESSMENT — VISUAL ACUITY
OD_CC: 20/30
OS_CC: 20/25

## 2025-05-28 DIAGNOSIS — E11.9 CONTROLLED TYPE 2 DIABETES MELLITUS WITHOUT COMPLICATION, WITHOUT LONG-TERM CURRENT USE OF INSULIN (HCC): ICD-10-CM

## 2025-05-29 RX ORDER — EMPAGLIFLOZIN 25 MG/1
25 TABLET, FILM COATED ORAL EVERY MORNING
Qty: 90 TABLET | Refills: 3 | Status: SHIPPED | OUTPATIENT
Start: 2025-05-29

## 2025-06-25 DIAGNOSIS — I10 ESSENTIAL HYPERTENSION: ICD-10-CM

## 2025-06-25 RX ORDER — LISINOPRIL AND HYDROCHLOROTHIAZIDE 10; 12.5 MG/1; MG/1
1 TABLET ORAL DAILY
Qty: 30 TABLET | Refills: 0 | Status: SHIPPED | OUTPATIENT
Start: 2025-06-25

## 2025-07-02 DIAGNOSIS — E11.9 CONTROLLED TYPE 2 DIABETES MELLITUS WITHOUT COMPLICATION, WITHOUT LONG-TERM CURRENT USE OF INSULIN (HCC): ICD-10-CM

## 2025-07-19 DIAGNOSIS — I10 ESSENTIAL HYPERTENSION: ICD-10-CM

## 2025-07-21 RX ORDER — LISINOPRIL AND HYDROCHLOROTHIAZIDE 10; 12.5 MG/1; MG/1
1 TABLET ORAL DAILY
Qty: 90 TABLET | Refills: 1 | Status: SHIPPED | OUTPATIENT
Start: 2025-07-21

## 2025-07-21 NOTE — TELEPHONE ENCOUNTER
Refill must be reviewed and completed by the office or provider. The refill is unable to be approved or denied by the medication management team.    Courtesy refill previously given.   Patient needs to complete cmp bw

## 2025-07-29 DIAGNOSIS — E11.9 CONTROLLED TYPE 2 DIABETES MELLITUS WITHOUT COMPLICATION, WITHOUT LONG-TERM CURRENT USE OF INSULIN (HCC): ICD-10-CM

## 2025-08-05 LAB
LEFT EYE DIABETIC RETINOPATHY: NORMAL
RIGHT EYE DIABETIC RETINOPATHY: NORMAL

## (undated) RX ORDER — TOBRAMYCIN AND DEXAMETHASONE 1; 3 MG/ML; MG/ML: 1 SUSPENSION/ DROPS OPHTHALMIC